# Patient Record
Sex: FEMALE | Race: BLACK OR AFRICAN AMERICAN | NOT HISPANIC OR LATINO | Employment: FULL TIME | URBAN - METROPOLITAN AREA
[De-identification: names, ages, dates, MRNs, and addresses within clinical notes are randomized per-mention and may not be internally consistent; named-entity substitution may affect disease eponyms.]

---

## 2017-05-02 ENCOUNTER — HOSPITAL ENCOUNTER (EMERGENCY)
Facility: HOSPITAL | Age: 42
Discharge: HOME | End: 2017-05-02
Attending: EMERGENCY MEDICINE
Payer: MEDICAID

## 2017-05-02 VITALS
BODY MASS INDEX: 36.96 KG/M2 | RESPIRATION RATE: 20 BRPM | SYSTOLIC BLOOD PRESSURE: 137 MMHG | HEIGHT: 66 IN | WEIGHT: 230 LBS | OXYGEN SATURATION: 98 % | DIASTOLIC BLOOD PRESSURE: 70 MMHG | HEART RATE: 75 BPM | TEMPERATURE: 99 F

## 2017-05-02 DIAGNOSIS — S39.012A LUMBAR STRAIN, INITIAL ENCOUNTER: Primary | ICD-10-CM

## 2017-05-02 PROCEDURE — 25000003 PHARM REV CODE 250: Performed by: EMERGENCY MEDICINE

## 2017-05-02 PROCEDURE — 99283 EMERGENCY DEPT VISIT LOW MDM: CPT

## 2017-05-02 RX ORDER — METHOCARBAMOL 500 MG/1
1500 TABLET, FILM COATED ORAL
Status: DISCONTINUED | OUTPATIENT
Start: 2017-05-02 | End: 2017-05-02 | Stop reason: HOSPADM

## 2017-05-02 RX ORDER — CYCLOBENZAPRINE HCL 10 MG
10 TABLET ORAL 3 TIMES DAILY PRN
Qty: 20 TABLET | Refills: 0 | Status: SHIPPED | OUTPATIENT
Start: 2017-05-02 | End: 2017-05-12

## 2017-05-02 RX ORDER — NAPROXEN 500 MG/1
500 TABLET ORAL 2 TIMES DAILY WITH MEALS
Qty: 30 TABLET | Refills: 0 | Status: SHIPPED | OUTPATIENT
Start: 2017-05-02 | End: 2017-05-07

## 2017-05-02 RX ORDER — NAPROXEN 500 MG/1
500 TABLET ORAL
Status: COMPLETED | OUTPATIENT
Start: 2017-05-02 | End: 2017-05-02

## 2017-05-02 RX ADMIN — NAPROXEN 500 MG: 500 TABLET ORAL at 08:05

## 2017-05-02 NOTE — ED AVS SNAPSHOT
[facility]  [address] [place] [address]               Eusebia Del Valle   2017  8:06 PM   ED    Description:  Female : 1977   Department:  [facility]           Your Care was Coordinated By:     Provider Role From To    Henrry Felton MD Attending Provider 17 --      Reason for Visit     Back Pain           Diagnoses this Visit        Comments    Lumbar strain, initial encounter    -  Primary       ED Disposition     None           To Do List           Follow-up Information     Schedule an appointment as soon as possible for a visit with Susanna Barron MD.    Specialty:  Internal Medicine    Why:  to establish primary care    Contact information:    [address] [place] [address] [phone]         These Medications        Disp Refills Start End    naproxen (NAPROSYN) 500 MG tablet 30 tablet 0 2017    Take 1 tablet (500 mg total) by mouth 2 (two) times daily with meals. - Oral    cyclobenzaprine (FLEXERIL) 10 MG tablet 20 tablet 0 2017    Take 1 tablet (10 mg total) by mouth 3 (three) times daily as needed for Muscle spasms. - Oral      [facility]     [facility] On [facility] Nurse Care Line -  Assistance  Unless otherwise directed by your provider, please contact [facility], our nurse care line that is available for  assistance.     Registered nurses in the [facility] provide: appointment scheduling, clinical advisement, health education, and other advisory services.  Call: [phone] (toll free)               Medications           Message regarding Medications     Verify the changes and/or additions to your medication regime listed below are the same as discussed with your clinician today.  If any of these changes or additions are incorrect, please notify your healthcare provider.        START taking these NEW medications        Refills    naproxen (NAPROSYN)  "500 MG tablet 0    Sig: Take 1 tablet (500 mg total) by mouth 2 (two) times daily with meals.    Class: Print    Route: Oral    cyclobenzaprine (FLEXERIL) 10 MG tablet 0    Sig: Take 1 tablet (10 mg total) by mouth 3 (three) times daily as needed for Muscle spasms.    Class: Print    Route: Oral      These medications were administered today        Dose Freq    naproxen tablet 500 mg 500 mg ED 1 Time    Sig: Take 1 tablet (500 mg total) by mouth ED 1 Time.    Class: Normal    Route: Oral    methocarbamol tablet 1,500 mg 1,500 mg ED 1 Time    Sig: Take 3 tablets (1,500 mg total) by mouth ED 1 Time.    Class: Normal    Route: Oral           Verify that the below list of medications is an accurate representation of the medications you are currently taking.  If none reported, the list may be blank. If incorrect, please contact your healthcare provider. Carry this list with you in case of emergency.           Current Medications     cyclobenzaprine (FLEXERIL) 10 MG tablet Take 1 tablet (10 mg total) by mouth 3 (three) times daily as needed for Muscle spasms.    hydrocodone-acetaminophen 5-325mg (NORCO) 5-325 mg per tablet Take 1 tablet by mouth every 4 (four) hours as needed.    methocarbamol tablet 1,500 mg Take 3 tablets (1,500 mg total) by mouth ED 1 Time.    naproxen (NAPROSYN) 500 MG tablet Take 1 tablet (500 mg total) by mouth 2 (two) times daily with meals.           Clinical Reference Information           Your Vitals Were     BP Pulse Temp Resp Height Weight    157/84 (BP Location: Left arm, Patient Position: Sitting) 82 98.2 °F (36.8 °C) (Oral) 18 5' 6" (1.676 m) 104.3 kg (230 lb)    Last Period SpO2 BMI          04/26/2017 (Approximate) 98% 37.12 kg/m2        Allergies as of 5/2/2017     No Known Allergies      Immunizations Administered on Date of Encounter - 5/2/2017     None      ED Micro, Lab, POCT     None      ED Imaging Orders     None      Discharge References/Attachments     LUMBOSACRAL STRAIN, " UNDERSTANDING (ENGLISH)       [facility] complies with applicable Federal civil rights laws and does not discriminate on the basis of race, color, national origin, age, disability, or sex.        Language Assistance Services     ATTENTION: Language assistance services are available, free of charge. Please call [phone].      ATENCIÓN: Si habla español, tiene a daigle disposición servicios gratuitos de asistencia lingüística. Llame al [phone].     CHÚ Ý: N?u b?n nói Ti?ng Vi?t, có các d?ch v? h? tr? ngôn ng? mi?n phí dành cho b?n. G?i s? [phone].

## 2017-05-03 NOTE — ED TRIAGE NOTES
"Lower back/bilateral flank pain began yesterday, "i work at [employer] and i picked up a red bag that was heavy".  "

## 2017-05-03 NOTE — ED PROVIDER NOTES
Encounter Date: 5/2/2017    SCRIBE #1 NOTE: I, Amara Garay, am scribing for, and in the presence of,  Henrry Felton MD. I have scribed the following portions of the note - Other sections scribed: HPI, ROS.       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 Reports pain 6/10 now.     Review of patient's allergies indicates:  No Known Allergies  HPI Comments: CC: Back Pain    HPI: 39 year old female with no reported PMHx presents to the ED c/o acute, moderate (6/10) lumbar back pain. Patient reports symptoms gradually worsened since the onset last night. Patient attributes the pain to picking up a heavy red bag of fluid at [place] yesterday. Patient denies hearing a pop. Pain worsens with bending and palpation. No prior treatment. LMP was 4/26/17. Patient denies a hx of back pain. Patient otherwise denies dysuria, hematuria, abdominal pain, fever, leg pain, vaginal discharge, numbness, weakness and other symptoms.      The history is provided by the patient. No  was used.     History reviewed. No pertinent past medical history.  History reviewed. No pertinent surgical history.  History reviewed. No pertinent family history.  Social History   Substance Use Topics    Smoking status: Never Smoker    Smokeless tobacco: None    Alcohol use No     Review of Systems   Constitutional: Negative for fever.   HENT: Negative for ear pain, rhinorrhea and sore throat.    Eyes: Negative for pain.   Respiratory: Negative for cough and shortness of breath.    Cardiovascular: Negative for chest pain.   Gastrointestinal: Negative for abdominal pain, diarrhea, nausea and vomiting.   Genitourinary: Negative for dysuria, hematuria and vaginal discharge.   Musculoskeletal: Positive for back pain (lumbar). Negative for neck pain.        (-) Leg Pain   Skin: Negative for rash and wound.    Neurological: Negative for weakness and numbness.       Physical Exam   Initial Vitals   BP Pulse Resp Temp SpO2   05/02/17 1913 05/02/17 1913 05/02/17 1913 05/02/17 1913 05/02/17 1913   157/84 82 18 98.2 °F (36.8 °C) 98 %     Physical Exam    Nursing note and vitals reviewed.  Constitutional: She appears well-developed and well-nourished.   Eyes: EOM are normal. Pupils are equal, round, and reactive to light.   Neck: Normal range of motion. Neck supple. No thyromegaly present. No JVD present.   Cardiovascular: Normal rate, regular rhythm, normal heart sounds and intact distal pulses. Exam reveals no gallop and no friction rub.    No murmur heard.  Pulmonary/Chest: Breath sounds normal. No respiratory distress.   Abdominal: Soft. Bowel sounds are normal.   Musculoskeletal: Normal range of motion. She exhibits tenderness. She exhibits no edema.   No midline ttp. FROM. Negative straight leg raise. Positive bilateral lumbar muscle ttp.    Neurological: She is alert and oriented to person, place, and time. She has normal strength.   Skin: Skin is warm and dry.         ED Course   Procedures  Labs Reviewed - No data to display                     Scribe Attestation:   Scribe #1: I performed the above scribed service and the documentation accurately describes the services I performed. I attest to the accuracy of the note.    Attending Attestation:           Physician Attestation for Scribe:  Physician Attestation Statement for Scribe #1: I, Henrry Felton MD, reviewed documentation, as scribed by Amara Garay in my presence, and it is both accurate and complete.                 ED Course     Clinical Impression:   The encounter diagnosis was Lumbar strain, initial encounter.          Henrry Felton MD  05/02/17 8270

## 2017-09-03 ENCOUNTER — HOSPITAL ENCOUNTER (EMERGENCY)
Facility: HOSPITAL | Age: 42
Discharge: HOME | End: 2017-09-03
Attending: EMERGENCY MEDICINE
Payer: MEDICAID

## 2017-09-03 VITALS
RESPIRATION RATE: 20 BRPM | SYSTOLIC BLOOD PRESSURE: 138 MMHG | HEIGHT: 66 IN | TEMPERATURE: 98 F | BODY MASS INDEX: 41.3 KG/M2 | WEIGHT: 257 LBS | OXYGEN SATURATION: 100 % | HEART RATE: 78 BPM | DIASTOLIC BLOOD PRESSURE: 81 MMHG

## 2017-09-03 DIAGNOSIS — J06.9 VIRAL URI WITH COUGH: Primary | ICD-10-CM

## 2017-09-03 DIAGNOSIS — J02.9 SORE THROAT: ICD-10-CM

## 2017-09-03 DIAGNOSIS — R09.81 NASAL CONGESTION: ICD-10-CM

## 2017-09-03 DIAGNOSIS — R09.82 POST-NASAL DRIP: ICD-10-CM

## 2017-09-03 PROBLEM — R05.9 COUGH: Status: ACTIVE | Noted: 2017-09-03

## 2017-09-03 LAB
B-HCG UR QL: NEGATIVE
CTP QC/QA: YES

## 2017-09-03 PROCEDURE — 99284 EMERGENCY DEPT VISIT MOD MDM: CPT

## 2017-09-03 PROCEDURE — 81025 URINE PREGNANCY TEST: CPT | Performed by: EMERGENCY MEDICINE

## 2017-09-03 RX ORDER — PROMETHAZINE HYDROCHLORIDE AND DEXTROMETHORPHAN HYDROBROMIDE 6.25; 15 MG/5ML; MG/5ML
5 SYRUP ORAL NIGHTLY PRN
Qty: 118 ML | Refills: 0 | Status: SHIPPED | OUTPATIENT
Start: 2017-09-03 | End: 2017-09-13

## 2017-09-03 RX ORDER — ALBUTEROL SULFATE 90 UG/1
1-2 AEROSOL, METERED RESPIRATORY (INHALATION) EVERY 6 HOURS PRN
Qty: 1 INHALER | Refills: 0 | Status: SHIPPED | OUTPATIENT
Start: 2017-09-03 | End: 2018-09-03

## 2017-09-03 RX ORDER — BENZONATATE 100 MG/1
100 CAPSULE ORAL 3 TIMES DAILY PRN
Qty: 15 CAPSULE | Refills: 0 | Status: SHIPPED | OUTPATIENT
Start: 2017-09-03 | End: 2017-09-13

## 2017-09-03 RX ORDER — FLUTICASONE PROPIONATE 50 MCG
1 SPRAY, SUSPENSION (ML) NASAL 2 TIMES DAILY PRN
Qty: 15 G | Refills: 0 | OUTPATIENT
Start: 2017-09-03 | End: 2019-02-08

## 2017-09-03 NOTE — DISCHARGE INSTRUCTIONS
Please return to the Emergency Department for any new or worsening symptoms including: worsening cough or congestion, fever, chest pain, shortness of breath, loss of consciousness, dizziness, weakness, or any other concerns.     Please follow up with your Primary Care Provider within in the week. If you do not have one, you may contact the one listed on your discharge paperwork or you may also call the [facility] Appointment [facility]k at [phone] to schedule an appointment with one.     Please take all medication as prescribed. Tessalon Pears - cough during the day. Phenergan DM for cough at night -  This medication may cause drowsiness, impair judgment, and reduce physical capabilities. Albuterol inhaler for cough. Flonase for nasal congestion.

## 2017-09-03 NOTE — ED PROVIDER NOTES
"Encounter Date: 9/3/2017    SCRIBE #1 NOTE: I, Nancy Glenna, am scribing for, and in the presence of,  MAIA Stauffer. I have scribed the following portions of the note - Other sections scribed: HPI and ROS.       History     Chief Complaint   Patient presents with    URI     cold x 1 week.  sore throat, runny nose, cough, low grade fever.  denies n/v/d     CC: URI    HPI: The pt is a 42 y.o. F with no pertinent PMHx who presents to the ED c/o URI that began 1 week ago. Pt reports that she has been experiencing sore throat, "dry" cough, postnasal drip, rhinorrhea, and mild fever. Pt expresses most concern about her sore throat. Pt denies being around sick people. Pt reports taking nyquil this morning. Pt states that she is not a smoker. No alleviating factors. Pt otherwise denies fever, nausea, emesis, and other associated symptoms.       The history is provided by the patient. No  was used.     Review of patient's allergies indicates:  No Known Allergies  History reviewed. No pertinent past medical history.  History reviewed. No pertinent surgical history.  History reviewed. No pertinent family history.  Social History   Substance Use Topics    Smoking status: Never Smoker    Smokeless tobacco: Never Used    Alcohol use No     Review of Systems   Constitutional: Positive for fever (subjective). Negative for chills and diaphoresis.   HENT: Positive for congestion, postnasal drip and rhinorrhea. Negative for ear pain and sore throat.         (+) sore throat   Eyes: Negative for redness.   Respiratory: Positive for cough. Negative for shortness of breath.    Cardiovascular: Negative for chest pain.   Gastrointestinal: Negative for abdominal pain, diarrhea, nausea and vomiting.   Genitourinary: Negative for dysuria.   Musculoskeletal: Negative for back pain and neck pain.   Skin: Negative for rash and wound.   Neurological: Negative for dizziness, syncope and numbness.   Psychiatric/Behavioral: " Negative for confusion.       Physical Exam     Initial Vitals [09/03/17 1006]   BP Pulse Resp Temp SpO2   (!) 148/84 82 16 98.2 °F (36.8 °C) 98 %      MAP       105.33         Physical Exam    Nursing note and vitals reviewed.  Constitutional: She appears well-developed and well-nourished. She is not diaphoretic. She is cooperative.  Non-toxic appearance. She does not have a sickly appearance. No distress.   HENT:   Head: Normocephalic and atraumatic.   Right Ear: External ear normal.   Left Ear: External ear normal.   Mouth/Throat: Uvula is midline and mucous membranes are normal. No trismus in the jaw. No uvula swelling. No oropharyngeal exudate, posterior oropharyngeal edema, posterior oropharyngeal erythema or tonsillar abscesses.   Tonsils, +2 without erythema or exudates.  Midline uvula.  No evidence of PTA.  Cobblestoning noted in the posterior oropharynx.   Eyes: Conjunctivae and EOM are normal.   Neck: Full passive range of motion without pain. No tracheal deviation present.   Cardiovascular: Normal rate, regular rhythm, normal heart sounds and intact distal pulses. Exam reveals no gallop and no friction rub.    No murmur heard.  Pulses:       Radial pulses are 2+ on the right side, and 2+ on the left side.   Pulmonary/Chest: Effort normal and breath sounds normal. No stridor. No tachypnea and no bradypnea. No respiratory distress. She has no wheezes. She has no rhonchi. She has no rales. She exhibits no tenderness.   Musculoskeletal: Normal range of motion.   Lymphadenopathy:     She has no cervical adenopathy.   Neurological: She is alert and oriented to person, place, and time. She has normal strength. No sensory deficit. Coordination and gait normal. GCS eye subscore is 4. GCS verbal subscore is 5. GCS motor subscore is 6.   Skin: Skin is warm, dry and intact. Capillary refill takes less than 2 seconds. No bruising and no rash noted. No cyanosis or erythema. Nails show no clubbing.   Psychiatric: She  has a normal mood and affect. Her speech is normal and behavior is normal. Judgment and thought content normal.         ED Course   Procedures  Labs Reviewed   POCT URINE PREGNANCY                   APC / Resident Notes:   This is an evaluation of a 42-year-old female that presents emergency Department with complaints of cough, runny nose, postnasal drip, sore throat for approximately one week.  She reports subjective fever earlier in the week. Physical Exam shows a non-toxic, afebrile, and well appearing female. Ears without evidence of infection.  There is cerumen in the right ear however the TM is fully visualized.  Oropharynx with +2 tonsils without evidence of infection, erythema, or exudates.  Midline uvula.  No evidence of PTA.  Neck is soft with no cervical adenopathy or meningeal signs.  Moist mucous membranes and appears well-hydrated.  Breath sounds are clear and equal to auscultation.  Heart regular rhythm, rate. Vital Signs Are Reassuring. RESULTS: UPT: Negative.  Chest x-ray: Chest x-ray with no pneumothorax, pneumonia, or pleural effusion.    My overall impression is viral URI with cough, nasal congestion. I considered, but at this time, do not suspect OM, OE, strep pharyngitis, meningitis, pneumonia, pneumothorax, pleural effusion.    D/C Meds: Tessalon Perles, Phenergan DM, albuterol, Flonase. Additional D/C Information: Hydration, Tylenol/ibuprofen for fever or body aches. The diagnosis, treatment plan, instructions for follow-up and reevaluation with her PCP as well as ED return precautions were discussed and understanding was verbalized. All questions or concerns have been addressed. This case was discussed with Dr. Taylor who is in agreement with my assessment and plan. VINOD Williamson, FNP-C       Scribe Attestation:   Scribe #1: I performed the above scribed service and the documentation accurately describes the services I performed. I attest to the accuracy of the note.    Attending  Attestation:           Physician Attestation for Scribe:  Physician Attestation Statement for Scribe #1: I, MAIA Stauffer, reviewed documentation, as scribed by Nancy Manzanares in my presence, and it is both accurate and complete.                 ED Course      Clinical Impression:   The encounter diagnosis was Cough.    Disposition:   Disposition: Discharged  Condition: Stable                        MAIA Hatfield  09/03/17 1227

## 2019-02-08 ENCOUNTER — HOSPITAL ENCOUNTER (EMERGENCY)
Facility: HOSPITAL | Age: 44
Discharge: HOME | End: 2019-02-08
Attending: EMERGENCY MEDICINE
Payer: MEDICAID

## 2019-02-08 VITALS
RESPIRATION RATE: 18 BRPM | WEIGHT: 230 LBS | TEMPERATURE: 98 F | SYSTOLIC BLOOD PRESSURE: 147 MMHG | HEIGHT: 66 IN | DIASTOLIC BLOOD PRESSURE: 74 MMHG | BODY MASS INDEX: 36.96 KG/M2 | HEART RATE: 84 BPM | OXYGEN SATURATION: 99 %

## 2019-02-08 DIAGNOSIS — R68.89 FLU-LIKE SYMPTOMS: Primary | ICD-10-CM

## 2019-02-08 DIAGNOSIS — J06.9 VIRAL URI WITH COUGH: ICD-10-CM

## 2019-02-08 DIAGNOSIS — J02.9 SORE THROAT: ICD-10-CM

## 2019-02-08 DIAGNOSIS — R05.9 COUGH: ICD-10-CM

## 2019-02-08 DIAGNOSIS — H92.02 LEFT EAR PAIN: ICD-10-CM

## 2019-02-08 LAB
B-HCG UR QL: NEGATIVE
CTP QC/QA: YES
CTP QC/QA: YES
FLUAV AG NPH QL: NEGATIVE
FLUBV AG NPH QL: NEGATIVE

## 2019-02-08 PROCEDURE — 63600175 PHARM REV CODE 636 W HCPCS: Performed by: NURSE PRACTITIONER

## 2019-02-08 PROCEDURE — 81025 URINE PREGNANCY TEST: CPT | Performed by: NURSE PRACTITIONER

## 2019-02-08 PROCEDURE — 99284 EMERGENCY DEPT VISIT MOD MDM: CPT | Mod: 25

## 2019-02-08 PROCEDURE — 96372 THER/PROPH/DIAG INJ SC/IM: CPT | Mod: 59

## 2019-02-08 PROCEDURE — 69209 REMOVE IMPACTED EAR WAX UNI: CPT

## 2019-02-08 PROCEDURE — 25000003 PHARM REV CODE 250: Performed by: NURSE PRACTITIONER

## 2019-02-08 RX ORDER — LORATADINE 10 MG/1
10 TABLET ORAL DAILY
Qty: 30 TABLET | Refills: 11 | Status: SHIPPED | OUTPATIENT
Start: 2019-02-08 | End: 2020-02-08

## 2019-02-08 RX ORDER — FLUTICASONE PROPIONATE 50 MCG
1 SPRAY, SUSPENSION (ML) NASAL 2 TIMES DAILY PRN
Qty: 15 G | Refills: 0 | Status: SHIPPED | OUTPATIENT
Start: 2019-02-08

## 2019-02-08 RX ORDER — OSELTAMIVIR PHOSPHATE 75 MG/1
75 CAPSULE ORAL 2 TIMES DAILY
Qty: 10 CAPSULE | Refills: 0 | Status: SHIPPED | OUTPATIENT
Start: 2019-02-08 | End: 2019-02-13

## 2019-02-08 RX ORDER — IBUPROFEN 600 MG/1
600 TABLET ORAL EVERY 6 HOURS PRN
Qty: 20 TABLET | Refills: 0 | Status: ON HOLD | OUTPATIENT
Start: 2019-02-08 | End: 2023-05-24 | Stop reason: HOSPADM

## 2019-02-08 RX ORDER — KETOROLAC TROMETHAMINE 30 MG/ML
30 INJECTION, SOLUTION INTRAMUSCULAR; INTRAVENOUS
Status: COMPLETED | OUTPATIENT
Start: 2019-02-08 | End: 2019-02-08

## 2019-02-08 RX ADMIN — Medication 5 DROP: at 04:02

## 2019-02-08 RX ADMIN — KETOROLAC TROMETHAMINE 30 MG: 30 INJECTION INTRAMUSCULAR; INTRAVENOUS at 04:02

## 2019-02-08 NOTE — ED TRIAGE NOTES
"Patient presents to the ED via personal transportation alone. Patient reports left ear pain x 2 days, sore throat x 1 days, productive cough x 2 days, and generalized body aches. Patient denies checking temperature at home, chills, nasal congestion, but reports having "the sweats last night".  Denies nausea, vomiting, diarrhea.  "

## 2019-02-08 NOTE — DISCHARGE INSTRUCTIONS

## 2019-02-08 NOTE — ED PROVIDER NOTES
Encounter Date: 2/8/2019       History     Chief Complaint   Patient presents with    Otalgia     Left ear pain, sore throat, and body aches x 2 days.  Reports chills last night.    Sore Throat    Generalized Body Aches     CC: Otalgia    HPI:  This is the evaluation of a 43-year-old female presenting with 2 day history of generalized body aches, subjective fever, chills, left ear pain, sore throat, and cough.  No attempted treatment prior to arrival.  She has no known medical problems.  No known allergies.      The history is provided by the patient. No  was used.     Review of patient's allergies indicates:  No Known Allergies  History reviewed. No pertinent past medical history.  History reviewed. No pertinent surgical history.  History reviewed. No pertinent family history.  Social History     Tobacco Use    Smoking status: Never Smoker    Smokeless tobacco: Never Used   Substance Use Topics    Alcohol use: No    Drug use: No     Review of Systems   Constitutional: Positive for chills and fever.   HENT: Positive for congestion, ear pain, rhinorrhea and sore throat.    Respiratory: Positive for cough. Negative for shortness of breath.    Cardiovascular: Negative for chest pain.   Gastrointestinal: Negative for nausea.   Genitourinary: Negative for dysuria.   Musculoskeletal: Positive for myalgias. Negative for back pain.   Skin: Negative for rash.   Neurological: Negative for weakness.   Hematological: Does not bruise/bleed easily.       Physical Exam     Initial Vitals [02/08/19 1614]   BP Pulse Resp Temp SpO2   (!) 147/74 84 18 98 °F (36.7 °C) 99 %      MAP       --         Physical Exam    Constitutional: She appears well-developed and well-nourished. She is not diaphoretic. No distress.   HENT:   Head: Normocephalic and atraumatic.   Right Ear: Hearing, tympanic membrane, external ear and ear canal normal.   Left Ear: Hearing, tympanic membrane, external ear and ear canal normal.    Nose: Mucosal edema and rhinorrhea present.   Mouth/Throat: Uvula is midline and mucous membranes are normal. Posterior oropharyngeal erythema present.   Eyes: Conjunctivae and EOM are normal. Pupils are equal, round, and reactive to light. Right eye exhibits no discharge. Left eye exhibits no discharge.   Neck: Normal range of motion. Neck supple.   Cardiovascular: Normal rate, regular rhythm and normal heart sounds. Exam reveals no gallop and no friction rub.    No murmur heard.  Pulmonary/Chest: Breath sounds normal. No respiratory distress.   Abdominal: Soft. There is no tenderness.   Musculoskeletal: Normal range of motion.   Lymphadenopathy:     She has no cervical adenopathy.   Neurological: She is alert and oriented to person, place, and time.   Skin: Skin is warm and dry.   Psychiatric: She has a normal mood and affect. Her behavior is normal.         ED Course   Ear Wax Removal  Date/Time: 2/8/2019 11:09 PM  Performed by: Mikala Ceja NP  Authorized by: Suraj Hernández MD   Ceruminolytics applied prior to the procedure.  Medication Used: Debrox.  Location details: left ear  Procedure type: irrigation Cerumen Removal Results: Cerumen completely removed.  Patient tolerance: Patient tolerated the procedure well with no immediate complications        Labs Reviewed   POCT URINE PREGNANCY   POCT INFLUENZA A/B          Imaging Results          X-Ray Chest PA And Lateral (Final result)  Result time 02/08/19 16:32:52    Final result by Kam Hutchinson Jr., MD (02/08/19 16:32:52)                 Impression:      No significant abnormality.      Electronically signed by: Kam Hutchinson MD  Date:    02/08/2019  Time:    16:32             Narrative:    EXAMINATION:  XR CHEST PA AND LATERAL    CLINICAL HISTORY:  Cough    TECHNIQUE:  PA and lateral views of the chest were performed.    COMPARISON:  September 2017.    FINDINGS:  Heart size and pulmonary vessels are normal.  The lungs are well aerated and clear.  No  pleural fluid.  Bones are intact.                                 Medical Decision Making:   ED Management:  This is an evaluation of a 43 y.o. female that presents to the Emergency Department for sore throat, ear pain, cough, rhinorrhea and nasal congestion for 2 days. The patient is a non-toxic, afebrile, and well appearing female. On physical exam ears and pharynx are without evidence of infection. Appears well hydrated with moist mucus membranes. Neck soft and supple with no meningeal signs or cervical lymphadenopathy. Breath sounds are clear and equal bilaterally with no adventitious breath sounds, tachypnea or respiratory distress with room air pulse ox of 99% and no evidence of hypoxia.     Vital Signs Are Reassuring.  RESULTS:   Chest x-ray with no acute process.  Symptoms consistent with flu-like illness, however flu is negative. She reports numerous sick contacts with the flu.  Will discharge patient on Tamiflu.  Symptomatic relief.  I considered, but at this time, do not suspect OM, OE, strep pharyngitis, meningitis, pneumonia, or acute bacterial sinusitis.    The diagnosis, treatment plan, instructions for follow-up and reevaluation with PCP as well as ED return precautions were discussed and understanding was verbalized. All questions or concerns have been addressed.                         Clinical Impression:   The primary encounter diagnosis was Flu-like symptoms. Diagnoses of Cough, Left ear pain, Sore throat, and Viral URI with cough were also pertinent to this visit.      Disposition:   Disposition: Discharged  Condition: Stable                        Mikala Ceja NP  02/08/19 5302

## 2020-07-21 ENCOUNTER — HOSPITAL ENCOUNTER (EMERGENCY)
Facility: HOSPITAL | Age: 45
Discharge: HOME | End: 2020-07-22
Attending: EMERGENCY MEDICINE
Payer: MEDICAID

## 2020-07-21 DIAGNOSIS — E11.9 NEW ONSET TYPE 2 DIABETES MELLITUS: Primary | ICD-10-CM

## 2020-07-21 LAB
ALBUMIN SERPL BCP-MCNC: 4.4 G/DL (ref 3.5–5.2)
ALP SERPL-CCNC: 184 U/L (ref 55–135)
ALT SERPL W/O P-5'-P-CCNC: 84 U/L (ref 10–44)
ANION GAP SERPL CALC-SCNC: 11 MMOL/L (ref 8–16)
AST SERPL-CCNC: 51 U/L (ref 10–40)
B-HCG UR QL: NEGATIVE
BASOPHILS # BLD AUTO: 0.03 K/UL (ref 0–0.2)
BASOPHILS NFR BLD: 0.3 % (ref 0–1.9)
BILIRUB SERPL-MCNC: 0.6 MG/DL (ref 0.1–1)
BUN SERPL-MCNC: 8 MG/DL (ref 6–20)
CALCIUM SERPL-MCNC: 9.7 MG/DL (ref 8.7–10.5)
CHLORIDE SERPL-SCNC: 100 MMOL/L (ref 95–110)
CO2 SERPL-SCNC: 23 MMOL/L (ref 23–29)
CREAT SERPL-MCNC: 1.1 MG/DL (ref 0.5–1.4)
CTP QC/QA: YES
DIFFERENTIAL METHOD: NORMAL
EOSINOPHIL # BLD AUTO: 0.1 K/UL (ref 0–0.5)
EOSINOPHIL NFR BLD: 1.4 % (ref 0–8)
ERYTHROCYTE [DISTWIDTH] IN BLOOD BY AUTOMATED COUNT: 11.5 % (ref 11.5–14.5)
EST. GFR  (AFRICAN AMERICAN): >60 ML/MIN/1.73 M^2
EST. GFR  (NON AFRICAN AMERICAN): >60 ML/MIN/1.73 M^2
GLUCOSE SERPL-MCNC: 249 MG/DL (ref 70–110)
GLUCOSE SERPL-MCNC: 472 MG/DL (ref 70–110)
HCT VFR BLD AUTO: 44.5 % (ref 37–48.5)
HGB BLD-MCNC: 15.2 G/DL (ref 12–16)
IMM GRANULOCYTES # BLD AUTO: 0.02 K/UL (ref 0–0.04)
IMM GRANULOCYTES NFR BLD AUTO: 0.2 % (ref 0–0.5)
LYMPHOCYTES # BLD AUTO: 4.3 K/UL (ref 1–4.8)
LYMPHOCYTES NFR BLD: 43.7 % (ref 18–48)
MCH RBC QN AUTO: 28.7 PG (ref 27–31)
MCHC RBC AUTO-ENTMCNC: 34.2 G/DL (ref 32–36)
MCV RBC AUTO: 84 FL (ref 82–98)
MONOCYTES # BLD AUTO: 0.5 K/UL (ref 0.3–1)
MONOCYTES NFR BLD: 5 % (ref 4–15)
NEUTROPHILS # BLD AUTO: 4.8 K/UL (ref 1.8–7.7)
NEUTROPHILS NFR BLD: 49.4 % (ref 38–73)
NRBC BLD-RTO: 0 /100 WBC
PLATELET # BLD AUTO: 350 K/UL (ref 150–350)
PMV BLD AUTO: 11.8 FL (ref 9.2–12.9)
POTASSIUM SERPL-SCNC: 4.3 MMOL/L (ref 3.5–5.1)
PROT SERPL-MCNC: 8 G/DL (ref 6–8.4)
RBC # BLD AUTO: 5.3 M/UL (ref 4–5.4)
SODIUM SERPL-SCNC: 134 MMOL/L (ref 136–145)
WBC # BLD AUTO: 9.8 K/UL (ref 3.9–12.7)

## 2020-07-21 PROCEDURE — 25000003 PHARM REV CODE 250: Performed by: EMERGENCY MEDICINE

## 2020-07-21 PROCEDURE — U0003 INFECTIOUS AGENT DETECTION BY NUCLEIC ACID (DNA OR RNA); SEVERE ACUTE RESPIRATORY SYNDROME CORONAVIRUS 2 (SARS-COV-2) (CORONAVIRUS DISEASE [COVID-19]), AMPLIFIED PROBE TECHNIQUE, MAKING USE OF HIGH THROUGHPUT TECHNOLOGIES AS DESCRIBED BY CMS-2020-01-R: HCPCS

## 2020-07-21 PROCEDURE — 63600175 PHARM REV CODE 636 W HCPCS: Performed by: EMERGENCY MEDICINE

## 2020-07-21 PROCEDURE — 96361 HYDRATE IV INFUSION ADD-ON: CPT

## 2020-07-21 PROCEDURE — 85025 COMPLETE CBC W/AUTO DIFF WBC: CPT

## 2020-07-21 PROCEDURE — 80053 COMPREHEN METABOLIC PANEL: CPT

## 2020-07-21 PROCEDURE — 81025 URINE PREGNANCY TEST: CPT | Performed by: EMERGENCY MEDICINE

## 2020-07-21 PROCEDURE — 99284 EMERGENCY DEPT VISIT MOD MDM: CPT | Mod: 25

## 2020-07-21 PROCEDURE — 96374 THER/PROPH/DIAG INJ IV PUSH: CPT

## 2020-07-21 RX ADMIN — INSULIN HUMAN 6 UNITS: 100 INJECTION, SOLUTION PARENTERAL at 11:07

## 2020-07-21 RX ADMIN — SODIUM CHLORIDE 1000 ML: 0.9 INJECTION, SOLUTION INTRAVENOUS at 10:07

## 2020-07-21 NOTE — Clinical Note
Eusebia Noyolas was seen and treated in our emergency department on 7/21/2020.  She may return to work on 07/24/2020.       If you have any questions or concerns, please don't hesitate to call.       RN

## 2020-07-22 VITALS
BODY MASS INDEX: 37.12 KG/M2 | TEMPERATURE: 98 F | WEIGHT: 230 LBS | DIASTOLIC BLOOD PRESSURE: 81 MMHG | HEART RATE: 77 BPM | SYSTOLIC BLOOD PRESSURE: 144 MMHG | RESPIRATION RATE: 16 BRPM | OXYGEN SATURATION: 100 %

## 2020-07-22 PROCEDURE — 25000003 PHARM REV CODE 250: Performed by: EMERGENCY MEDICINE

## 2020-07-22 RX ORDER — METFORMIN HYDROCHLORIDE 500 MG/1
500 TABLET ORAL 2 TIMES DAILY WITH MEALS
Qty: 60 TABLET | Refills: 11 | Status: ON HOLD | OUTPATIENT
Start: 2020-07-22 | End: 2023-05-24 | Stop reason: HOSPADM

## 2020-07-22 RX ORDER — METFORMIN HYDROCHLORIDE 500 MG/1
500 TABLET ORAL ONCE
Status: COMPLETED | OUTPATIENT
Start: 2020-07-22 | End: 2020-07-22

## 2020-07-22 RX ADMIN — METFORMIN HYDROCHLORIDE 500 MG: 500 TABLET, FILM COATED ORAL at 12:07

## 2020-07-22 NOTE — ED PROVIDER NOTES
"Encounter Date: 7/21/2020    SCRIBE #1 NOTE: I, Jacques Mi, am scribing for, and in the presence of,  Jericho Holman MD. I have scribed the following portions of the note - Other sections scribed: HPI, ROS, PE, MDM.       History     Chief Complaint   Patient presents with    Generalized Body Aches     Gen body aches, cough x1 week. Denies fever, chest pain, sob. "I've been around my sister who tested positive today, so my nerves bad"     Eusebia Del Valle is an 45 y.o. female presenting to the ED complaining of a sudden onset of generalized body aches persisting for one week. Patient reports symptoms of lightheadedness when moving down and getting up. Patient denies chest pain, shortness of breath, dysuria, hematuria, weakness with walking, unsteady gait, vaginal bleeding, or vaginal discharge. Patient is currently on Ibuprofen and Loratadine medication. Patient denies a past medical history of diabetes mellitus, hypertension, or anemia. No significant past surgical history reported. No known drug allergies. No tobacco, EtOH, or street drug abuse.    The history is provided by the patient.     Review of patient's allergies indicates:  No Known Allergies  History reviewed. No pertinent past medical history.  History reviewed. No pertinent surgical history.  History reviewed. No pertinent family history.  Social History     Tobacco Use    Smoking status: Never Smoker    Smokeless tobacco: Never Used   Substance Use Topics    Alcohol use: No    Drug use: No     Review of Systems   Constitutional: Negative for chills, diaphoresis, fatigue and fever.   HENT: Negative for congestion, sinus pain and sore throat.    Respiratory: Negative for cough, shortness of breath, wheezing and stridor.    Cardiovascular: Negative for chest pain, palpitations and leg swelling.   Gastrointestinal: Negative for abdominal pain, diarrhea, nausea and vomiting.   Genitourinary: Negative for dysuria, flank pain and frequency. "   Musculoskeletal: Positive for myalgias. Negative for back pain and joint swelling.   Neurological: Positive for light-headedness. Negative for dizziness, syncope, facial asymmetry, speech difficulty, weakness, numbness and headaches.   Psychiatric/Behavioral: Negative for confusion.       Physical Exam     Initial Vitals [07/21/20 1938]   BP Pulse Resp Temp SpO2   (!) 174/95 81 17 98 °F (36.7 °C) 100 %      MAP       --         Physical Exam    Nursing note and vitals reviewed.  Constitutional: She appears well-developed and well-nourished. She is not diaphoretic. No distress.   HENT:   Head: Normocephalic and atraumatic.   Mouth/Throat: Oropharynx is clear and moist. No oropharyngeal exudate.   Eyes: Conjunctivae are normal. Right eye exhibits no discharge. Left eye exhibits no discharge. No scleral icterus.   Neck: Normal range of motion. Neck supple. No thyromegaly present. No tracheal deviation present. No JVD present.   Cardiovascular: Normal rate, regular rhythm, normal heart sounds and intact distal pulses. Exam reveals no gallop and no friction rub.    No murmur heard.  Pulmonary/Chest: Breath sounds normal. No stridor. No respiratory distress. She has no wheezes. She has no rhonchi. She has no rales. She exhibits no tenderness.   Abdominal: Soft. She exhibits no distension and no mass. There is no abdominal tenderness. There is no rebound and no guarding.   Musculoskeletal: Normal range of motion. No tenderness or edema.   Lymphadenopathy:     She has no cervical adenopathy.   Neurological: She is alert and oriented to person, place, and time. She has normal strength. GCS score is 15. GCS eye subscore is 4. GCS verbal subscore is 5. GCS motor subscore is 6.   AVIS with NGND's   Skin: Skin is warm and dry. Capillary refill takes less than 2 seconds. No rash and no abscess noted. No erythema. No pallor.   Psychiatric: She has a normal mood and affect. Her behavior is normal. Judgment and thought content  normal.         ED Course   Procedures  Labs Reviewed   COMPREHENSIVE METABOLIC PANEL - Abnormal; Notable for the following components:       Result Value    Sodium 134 (*)     Glucose 472 (*)     Alkaline Phosphatase 184 (*)     AST 51 (*)     ALT 84 (*)     All other components within normal limits    Narrative:     GLUCOSE    critical result(s) called and verbal readback obtained   from JUDSON PALAFOX by [facility] 07/21/2020 22:25   CBC W/ AUTO DIFFERENTIAL   SARS-COV-2 (COVID-19) QUALITATIVE PCR   POCT URINE PREGNANCY   POCT GLUCOSE MONITORING CONTINUOUS          Imaging Results    None          Medical Decision Making:   Clinical Tests:   Lab Tests: Ordered and Reviewed            Scribe Attestation:   Scribe #1: I performed the above scribed service and the documentation accurately describes the services I performed. I attest to the accuracy of the note.      Pt arrived alert, afebrile, non-toxic in appearance, in no acute respiratory distress with VSS.  Labs returned concerning for new onset DM.  BG improved with insulin and IVF's.  Pt given Rx of metformin and counseled she has new onset DM and needs to F/U closely with a PCP within the next several days.  Pt discharged and counseled on the need to return to the nearest emergency room if they experience any other concerning symptoms.  Pt counseled to F/U outpatient with a PCP over the next week.    Jericho Holman MD                              Clinical Impression:     1. New onset type 2 diabetes mellitus                ED Disposition Condition    Discharge Stable        ED Prescriptions     Medication Sig Dispense Start Date End Date Auth. Provider    metFORMIN (GLUCOPHAGE) 500 MG tablet Take 1 tablet (500 mg total) by mouth 2 (two) times daily with meals. 60 tablet 7/22/2020 7/22/2021 Jericho Holman MD        Follow-up Information     Follow up With Specialties Details Why Contact Info    Will Palafox MD Internal Medicine Schedule an appointment as soon  as possible for a visit in 3 days to follow-up on today's visit [address]  [place] [address]  [phone]      [facility] Emergency Medicine Go to  As needed, If symptoms worsen [address] [place] [address] [phone]                           I, Jericho Holman, personally performed the services described in this documentation. All medical record entries made by the scribe were at my direction and in my presence.  I have reviewed the chart and agree that the record reflects my personal performance and is accurate and complete.             Jericho Holman MD  07/22/20 2905

## 2020-07-22 NOTE — ED NOTES
45 y.o. female to ED with c.o. generalized body aches with associated dizziness. Patient states she has been working a lot and has not been getting much sleep. Patient denies shortness of breath/ cough/ chest pain, denies fever/chills, denies n/v/d.

## 2020-07-23 ENCOUNTER — NURSE TRIAGE (OUTPATIENT)
Dept: ADMINISTRATIVE | Facility: CLINIC | Age: 45
End: 2020-07-23

## 2020-07-23 LAB
B-HCG UR QL: NEGATIVE
CTP QC/QA: YES
POCT GLUCOSE: 249 MG/DL (ref 70–110)

## 2020-07-23 PROCEDURE — 96372 THER/PROPH/DIAG INJ SC/IM: CPT | Mod: 59

## 2020-07-23 PROCEDURE — 81025 URINE PREGNANCY TEST: CPT | Performed by: PHYSICIAN ASSISTANT

## 2020-07-23 PROCEDURE — 99284 EMERGENCY DEPT VISIT MOD MDM: CPT | Mod: 25

## 2020-07-23 PROCEDURE — 82962 GLUCOSE BLOOD TEST: CPT

## 2020-07-23 NOTE — TELEPHONE ENCOUNTER
Pt state her right foot went numb suddenly, also states her neck is numb, advised her to call 911, she states she is going to get someone to bring her to ER, advised her to call back with any other needs or concerns, caller agreed     Reason for Disposition   [1] Numbness (i.e., loss of sensation) of the face, arm / hand, or leg / foot on one side of the body AND [2] sudden onset AND [3] present now    Protocols used: NEUROLOGIC DEFICIT-A-AH

## 2020-07-24 ENCOUNTER — HOSPITAL ENCOUNTER (EMERGENCY)
Facility: HOSPITAL | Age: 45
Discharge: HOME | End: 2020-07-24
Attending: EMERGENCY MEDICINE
Payer: MEDICAID

## 2020-07-24 VITALS
HEIGHT: 66 IN | DIASTOLIC BLOOD PRESSURE: 73 MMHG | HEART RATE: 81 BPM | TEMPERATURE: 98 F | RESPIRATION RATE: 18 BRPM | WEIGHT: 230 LBS | SYSTOLIC BLOOD PRESSURE: 132 MMHG | BODY MASS INDEX: 36.96 KG/M2

## 2020-07-24 DIAGNOSIS — R73.9 HYPERGLYCEMIA: Primary | ICD-10-CM

## 2020-07-24 LAB
ALBUMIN SERPL BCP-MCNC: 4 G/DL (ref 3.5–5.2)
ALP SERPL-CCNC: 160 U/L (ref 55–135)
ALT SERPL W/O P-5'-P-CCNC: 92 U/L (ref 10–44)
ANION GAP SERPL CALC-SCNC: 10 MMOL/L (ref 8–16)
AST SERPL-CCNC: 55 U/L (ref 10–40)
BILIRUB SERPL-MCNC: 0.7 MG/DL (ref 0.1–1)
BUN SERPL-MCNC: 14 MG/DL (ref 6–20)
CALCIUM SERPL-MCNC: 9.3 MG/DL (ref 8.7–10.5)
CHLORIDE SERPL-SCNC: 103 MMOL/L (ref 95–110)
CO2 SERPL-SCNC: 19 MMOL/L (ref 23–29)
CREAT SERPL-MCNC: 1 MG/DL (ref 0.5–1.4)
EST. GFR  (AFRICAN AMERICAN): >60 ML/MIN/1.73 M^2
EST. GFR  (NON AFRICAN AMERICAN): >60 ML/MIN/1.73 M^2
GLUCOSE SERPL-MCNC: 419 MG/DL (ref 70–110)
POCT GLUCOSE: 343 MG/DL (ref 70–110)
POCT GLUCOSE: 344 MG/DL (ref 70–110)
POTASSIUM SERPL-SCNC: 3.8 MMOL/L (ref 3.5–5.1)
PROT SERPL-MCNC: 6.9 G/DL (ref 6–8.4)
SARS-COV-2 RNA RESP QL NAA+PROBE: NOT DETECTED
SODIUM SERPL-SCNC: 132 MMOL/L (ref 136–145)

## 2020-07-24 PROCEDURE — 80053 COMPREHEN METABOLIC PANEL: CPT

## 2020-07-24 PROCEDURE — 25000003 PHARM REV CODE 250: Performed by: EMERGENCY MEDICINE

## 2020-07-24 PROCEDURE — 63600175 PHARM REV CODE 636 W HCPCS: Performed by: EMERGENCY MEDICINE

## 2020-07-24 RX ORDER — ACETAMINOPHEN 500 MG
1000 TABLET ORAL
Status: COMPLETED | OUTPATIENT
Start: 2020-07-24 | End: 2020-07-24

## 2020-07-24 RX ADMIN — INSULIN HUMAN 4 UNITS: 100 INJECTION, SOLUTION PARENTERAL at 02:07

## 2020-07-24 RX ADMIN — ACETAMINOPHEN 1000 MG: 500 TABLET ORAL at 12:07

## 2020-07-24 NOTE — Clinical Note
Eusebia Del Valle was seen and treated in our emergency department on 7/23/2020.  She may return to work on 07/27/2020.       If you have any questions or concerns, please don't hesitate to call.      Bryant Kessler RN RN

## 2020-07-24 NOTE — DISCHARGE INSTRUCTIONS
CT scan does not show evidence of a stroke.  Your blood sugar is elevated.  You have been given insulin in the emergency department.  It is important that you continue your medications as you have been prescribed.  Drink plenty of fluids to remain hydrated.  Comply with a diet for diabetics.  Monitor your blood glucose and keep a log of your blood sugars.  Schedule close follow-up with a primary care physician to monitor your blood sugar and address if any further adjustments to your diabetes medication regiment are warranted.    Thank you for coming to our Emergency Department today. It is important to remember that some problems are difficult to diagnose and may not be found during your first visit. Be sure to follow up with your primary care doctor and review any labs/imaging that was performed with them. If you do not have a primary care doctor, you may contact the one listed on your discharge paperwork or you may also call the [facility] Appointment Desk at [phone] to schedule an appointment with one.     All medications may potentially have side effects and it is impossible to predict which medications may give you side effects. If you feel that you are having a negative effect of any medication you should immediately stop taking them and seek medical attention.    Return to the ER with any questions/concerns, new/concerning symptoms, worsening or failure to improve. Do not drive or make any important decisions for 24 hours if you have received any pain medications, sedatives or mood altering drugs during your ER visit.

## 2020-07-24 NOTE — ED PROVIDER NOTES
Encounter Date: 7/23/2020    SCRIBE #1 NOTE: I, Jacques Love, am scribing for, and in the presence of,  Zuly Fowler MD. I have scribed the following portions of the note - Other sections scribed: HPI, ROS, PE, MDM.       History     Chief Complaint   Patient presents with    Numbness     Pt c/o numbness to the RLE and right back side of neck that began earlier today. Pt reports she just started taking Metformin last Wednesday. Speech is normal, face symmetrical, equal strength in BLE and no neuro deficits noted.     This is a 45 y.o. female with a PMHx of diabetes mellitus who presents to the Emergency Department complaining of numbness and tingling of the right foot and right-side of neck with associated headache that started today. Patient states complaint presenting at 4:00 PM atfer administering two doses of Metformin. Patient reports symptoms of minimal visual disturbance and frequency. Patient endorses diarrhea secondary to medication side effect. Patient denies chest pain, shortness of breath, nausea, emesis, abdominal pain, melena, or dysuria. Patient states she was recently diagnosed with diabetes mellitus type 2 on Tuesday and has been compliant with her new Metformin prescription.     The history is provided by the patient.     Review of patient's allergies indicates:  No Known Allergies  Past Medical History:   Diagnosis Date    Diabetes mellitus      No past surgical history on file.  No family history on file.  Social History     Tobacco Use    Smoking status: Never Smoker    Smokeless tobacco: Never Used   Substance Use Topics    Alcohol use: No    Drug use: No     Review of Systems   Constitutional: Negative for chills, diaphoresis, fatigue and fever.   HENT: Negative for congestion, sinus pain and sore throat.    Eyes: Positive for visual disturbance (resolved).   Respiratory: Negative for cough, shortness of breath, wheezing and stridor.    Cardiovascular: Negative for chest pain,  palpitations and leg swelling.   Gastrointestinal: Positive for diarrhea. Negative for abdominal pain, blood in stool, nausea and vomiting.   Genitourinary: Positive for frequency. Negative for dysuria and flank pain.   Musculoskeletal: Negative for back pain and joint swelling.   Neurological: Positive for numbness (resolved) and headaches. Negative for dizziness, syncope, facial asymmetry, speech difficulty and weakness.   Psychiatric/Behavioral: Negative for confusion.       Physical Exam     Initial Vitals [07/23/20 2218]   BP Pulse Resp Temp SpO2   132/73 81 18 98.1 °F (36.7 °C) --      MAP       --         Physical Exam    Nursing note and vitals reviewed.  Constitutional: She is not diaphoretic. No distress.   HENT:   Head: Normocephalic and atraumatic.   Mouth/Throat: Oropharynx is clear and moist.   Eyes: EOM are normal. Pupils are equal, round, and reactive to light. No scleral icterus.   Neck: Normal range of motion. Neck supple. No JVD present.   Cardiovascular: Normal rate, regular rhythm and intact distal pulses.   Pulmonary/Chest: Breath sounds normal. No stridor. No respiratory distress.   Abdominal: Soft. Bowel sounds are normal. She exhibits no distension and no mass. There is no abdominal tenderness. There is no rebound and no guarding.   Musculoskeletal: Normal range of motion. No tenderness or edema.      Comments: No midline vertebral tenderness   Neurological: She is alert. She has normal strength. No cranial nerve deficit or sensory deficit. GCS score is 15. GCS eye subscore is 4. GCS verbal subscore is 5. GCS motor subscore is 6.   Skin: Skin is warm and dry.   Psychiatric: She has a normal mood and affect.         ED Course   Procedures  Labs Reviewed   COMPREHENSIVE METABOLIC PANEL - Abnormal; Notable for the following components:       Result Value    Sodium 132 (*)     CO2 19 (*)     Glucose 419 (*)     Alkaline Phosphatase 160 (*)     AST 55 (*)     ALT 92 (*)     All other components  within normal limits   POCT GLUCOSE - Abnormal; Notable for the following components:    POCT Glucose 343 (*)     All other components within normal limits   POCT GLUCOSE - Abnormal; Notable for the following components:    POCT Glucose 344 (*)     All other components within normal limits   POCT URINE PREGNANCY          Imaging Results          CT Head Without Contrast (Final result)  Result time 07/23/20 23:33:29    Final result by Symone العراقي MD (07/23/20 23:33:29)                 Impression:      No CT evidence of acute intracranial abnormality.  If there is clinical concern for acute ischemia, further evaluation with MRI is recommended if there are no clinical contraindications.      Electronically signed by: Symone العراقي MD  Date:    07/23/2020  Time:    23:33             Narrative:    EXAMINATION:  CT HEAD WITHOUT CONTRAST    CLINICAL HISTORY:  TIA, initial exam;    TECHNIQUE:  Low dose axial images were obtained through the head.  Coronal and sagittal reformations were also performed. Contrast was not administered.    COMPARISON:  None.    FINDINGS:  There is no acute intracranial hemorrhage, hydrocephalus, midline shift or mass effect. Gray-white matter differentiation appears maintained. The basal cisterns are patent. The mastoid air cells and paranasal sinuses are clear of acute process.  Heterogeneous material in the right external auditory canal likely reflect cerumen although correlation with physical exam advised.  The visualized bones of the calvarium demonstrate no acute osseous abnormality.                                 Medical Decision Making:   History:   Old Medical Records: I decided to obtain old medical records.  Differential Diagnosis:   Includes but not limited to: Electrolyte Abnormality; Medicine Side Effect; Renal Failure.  Clinical Tests:   Lab Tests: Reviewed and Ordered  Radiological Study: Reviewed and Ordered  ED Management:  Patient is afebrile and in no acute distress at  time history and physical.  She has no obvious focal neurological deficits.  She has symmetrical strength in bilateral upper and lower extremities.  Sensation is intact to light touch throughout.  She has no focal abdominal tenderness.  Vital signs are within acceptable ranges.  CT of the brain does not suggest CVA.  Recent labs reviewed patient not have leukocytosis to suggest infectious process.  CMP ordered to evaluate for electrolyte abnormality.  Patient is significantly hyperglycemic.  She does not have anion gap to suggest DKA.  Patient has mild transaminitis that is stable.  She is tolerating p.o. without difficulty.  I have low clinical suspicion of cholecystitis or pancreatitis as the cause of patient's symptoms.  Patient currently has no symptoms consistent with hyperglycemia.  She does not appear to require insulin infusion or further emergency department diabetes workup.  She is clinically stable for discharge.  Patient reports not knowing what foods to eat for diabetes.  She has been counseled extensively on a diabetic diet.  Social work consult placed to aid patient with her insurance issues as well as with diabetes counseling hand setting up follow-up. She has been provided with printed information on eating diabetic diet, monitoring, using a glucose log. counseled on supportive care, appropriate medication usage, concerning symptoms for which to return to ER and the importance of follow up. Understanding and agreement with treatment plan was expressed.   This chart was completed using dictation software, as a result there may be some transcription errors.             Scribe Attestation:   Scribe #1: I performed the above scribed service and the documentation accurately describes the services I performed. I attest to the accuracy of the note.                          Clinical Impression:     1. Hyperglycemia            Disposition:   Disposition: Discharged  Condition: Stable     ED Disposition  Condition    Discharge Stable        ED Prescriptions     None        Follow-up Information     Follow up With Specialties Details Why Contact Info    Will Palafox MD Internal Medicine Schedule an appointment as soon as possible for a visit   [address] [facility] [address]  [phone]      [facility]  Schedule an appointment as soon as possible for a visit   [address] [facility] [address]  [phone]                            I, Zuly Fowler , personally performed the services described in this documentation. All medical record entries made by the scribe were at my direction and in my presence. I have reviewed the chart and agree that the record reflects my personal performance and is accurate and complete.             Zuly Fowler MD  07/24/20 4761

## 2020-07-24 NOTE — ED TRIAGE NOTES
Pt amb to room, here for eval of numbness and cramping sensation to feet and right side of neck while driving today after taking new medication for diabetes. Denies numbness at this time, denies chest pain, sob, n/v, easy rr/nonlabored, talks in full sentences

## 2020-07-24 NOTE — PROVIDER PROGRESS NOTES - EMERGENCY DEPT.
Emergency Department TeleTRIAGE Encounter Note      CHIEF COMPLAINT    Chief Complaint   Patient presents with    Numbness     Pt c/o numbness to the RLE and right back side of neck that began earlier today. Pt reports she just started taking Metformin last Wednesday. Speech is normal, face symmetrical, equal strength in BLE and no neuro deficits noted.       VITAL SIGNS   Initial Vitals [07/23/20 2218]   BP Pulse Resp Temp SpO2   132/73 81 18 98.1 °F (36.7 °C) --      MAP       --            ALLERGIES    Review of patient's allergies indicates:  No Known Allergies    PROVIDER TRIAGE NOTE  Patient is a 45 y.o. female presenting for evaluation of numbness. States she was driving earlier in her foot felt numb.  States it is resolved now.  Recently started metformin due to new onset diabetes.      ORDERS  Labs Reviewed - No data to display    ED Orders (720h ago, onward)    Start Ordered     Status Ordering Provider    07/23/20 2221 07/23/20 2220  POCT urine pregnancy  Once      Ordered JUDSON LINDSAY            Virtual Visit Note: The provider triage portion of this emergency department evaluation and documentation was performed via [employer], a HIPAA-compliant telemedicine application, in concert with a tele-presenter in the room. A face to face patient evaluation with one of my colleagues will occur once the patient is placed in an emergency department room.      DISCLAIMER: This note was prepared with [employer] voice recognition transcription software. Garbled syntax, mangled pronouns, and other bizarre constructions may be attributed to that software system.

## 2020-07-28 NOTE — PROGRESS NOTES
Post discharge f/u:  Patient was seen in ED on Friday 7/24/20  and needed diabetic teaching and followup.  SW spoke with patient this date to determine if she had been able to f/u with Dr Will Palafox.  Patient stated that she had not followup because her insurance (medicaid) had been discontinued.  SW provided patient with contact number to the [facility] on [address]  ([phone]).  Patient to call to make appointments.  SW advised patient to bring her ID, proof of income, and a utility bill with her to the appointment.  Patient verbalized understanding of information.    Wendy Noel LMSW, [facility]  7/28/20

## 2021-03-17 ENCOUNTER — HOSPITAL ENCOUNTER (EMERGENCY)
Facility: HOSPITAL | Age: 46
Discharge: HOME | End: 2021-03-18
Attending: STUDENT IN AN ORGANIZED HEALTH CARE EDUCATION/TRAINING PROGRAM
Payer: MEDICAID

## 2021-03-17 DIAGNOSIS — R73.9 HYPERGLYCEMIA: Primary | ICD-10-CM

## 2021-03-17 DIAGNOSIS — M54.2 NECK PAIN: ICD-10-CM

## 2021-03-17 LAB
ALBUMIN SERPL BCP-MCNC: 3.9 G/DL (ref 3.5–5.2)
ALLENS TEST: ABNORMAL
ALP SERPL-CCNC: 165 U/L (ref 55–135)
ALT SERPL W/O P-5'-P-CCNC: 73 U/L (ref 10–44)
ANION GAP SERPL CALC-SCNC: 11 MMOL/L (ref 8–16)
AST SERPL-CCNC: 47 U/L (ref 10–40)
B-OH-BUTYR BLD STRIP-SCNC: 0.1 MMOL/L (ref 0–0.5)
BACTERIA #/AREA URNS HPF: NORMAL /HPF
BASOPHILS # BLD AUTO: 0.04 K/UL (ref 0–0.2)
BASOPHILS NFR BLD: 0.4 % (ref 0–1.9)
BILIRUB SERPL-MCNC: 0.4 MG/DL (ref 0.1–1)
BILIRUB UR QL STRIP: NEGATIVE
BUN SERPL-MCNC: 10 MG/DL (ref 6–20)
CALCIUM SERPL-MCNC: 9.2 MG/DL (ref 8.7–10.5)
CHLORIDE SERPL-SCNC: 101 MMOL/L (ref 95–110)
CLARITY UR: CLEAR
CO2 SERPL-SCNC: 22 MMOL/L (ref 23–29)
COLOR UR: COLORLESS
CREAT SERPL-MCNC: 1.1 MG/DL (ref 0.5–1.4)
DELSYS: ABNORMAL
DIFFERENTIAL METHOD: ABNORMAL
EOSINOPHIL # BLD AUTO: 0.1 K/UL (ref 0–0.5)
EOSINOPHIL NFR BLD: 1.4 % (ref 0–8)
ERYTHROCYTE [DISTWIDTH] IN BLOOD BY AUTOMATED COUNT: 11.1 % (ref 11.5–14.5)
EST. GFR  (AFRICAN AMERICAN): >60 ML/MIN/1.73 M^2
EST. GFR  (NON AFRICAN AMERICAN): >60 ML/MIN/1.73 M^2
GLUCOSE SERPL-MCNC: 578 MG/DL (ref 70–110)
GLUCOSE UR QL STRIP: ABNORMAL
HCO3 UR-SCNC: 26.9 MMOL/L (ref 24–28)
HCT VFR BLD AUTO: 42.9 % (ref 37–48.5)
HGB BLD-MCNC: 14.8 G/DL (ref 12–16)
HGB UR QL STRIP: NEGATIVE
IMM GRANULOCYTES # BLD AUTO: 0.03 K/UL (ref 0–0.04)
IMM GRANULOCYTES NFR BLD AUTO: 0.3 % (ref 0–0.5)
KETONES UR QL STRIP: ABNORMAL
LEUKOCYTE ESTERASE UR QL STRIP: NEGATIVE
LYMPHOCYTES # BLD AUTO: 3.8 K/UL (ref 1–4.8)
LYMPHOCYTES NFR BLD: 37.3 % (ref 18–48)
MCH RBC QN AUTO: 29.4 PG (ref 27–31)
MCHC RBC AUTO-ENTMCNC: 34.5 G/DL (ref 32–36)
MCV RBC AUTO: 85 FL (ref 82–98)
MICROSCOPIC COMMENT: NORMAL
MONOCYTES # BLD AUTO: 0.3 K/UL (ref 0.3–1)
MONOCYTES NFR BLD: 3.3 % (ref 4–15)
NEUTROPHILS # BLD AUTO: 5.9 K/UL (ref 1.8–7.7)
NEUTROPHILS NFR BLD: 57.3 % (ref 38–73)
NITRITE UR QL STRIP: NEGATIVE
NRBC BLD-RTO: 0 /100 WBC
PCO2 BLDA: 42.4 MMHG (ref 35–45)
PH SMN: 7.41 [PH] (ref 7.35–7.45)
PH UR STRIP: 5 [PH] (ref 5–8)
PLATELET # BLD AUTO: 331 K/UL (ref 150–350)
PMV BLD AUTO: 11.7 FL (ref 9.2–12.9)
PO2 BLDA: 44 MMHG (ref 40–60)
POC BE: 2 MMOL/L
POC SATURATED O2: 80 % (ref 95–100)
POC TCO2: 28 MMOL/L (ref 24–29)
POCT GLUCOSE: 346 MG/DL (ref 70–110)
POCT GLUCOSE: 440 MG/DL (ref 70–110)
POCT GLUCOSE: 474 MG/DL (ref 70–110)
POTASSIUM SERPL-SCNC: 4 MMOL/L (ref 3.5–5.1)
PROT SERPL-MCNC: 7.5 G/DL (ref 6–8.4)
PROT UR QL STRIP: NEGATIVE
RBC # BLD AUTO: 5.03 M/UL (ref 4–5.4)
SAMPLE: ABNORMAL
SITE: ABNORMAL
SODIUM SERPL-SCNC: 134 MMOL/L (ref 136–145)
SP GR UR STRIP: >1.03 (ref 1–1.03)
URN SPEC COLLECT METH UR: ABNORMAL
UROBILINOGEN UR STRIP-ACNC: NEGATIVE EU/DL
WBC # BLD AUTO: 10.22 K/UL (ref 3.9–12.7)
YEAST URNS QL MICRO: NORMAL

## 2021-03-17 PROCEDURE — 96374 THER/PROPH/DIAG INJ IV PUSH: CPT

## 2021-03-17 PROCEDURE — 82962 GLUCOSE BLOOD TEST: CPT

## 2021-03-17 PROCEDURE — 96361 HYDRATE IV INFUSION ADD-ON: CPT

## 2021-03-17 PROCEDURE — 25000003 PHARM REV CODE 250: Performed by: PHYSICIAN ASSISTANT

## 2021-03-17 PROCEDURE — 25000003 PHARM REV CODE 250: Performed by: STUDENT IN AN ORGANIZED HEALTH CARE EDUCATION/TRAINING PROGRAM

## 2021-03-17 PROCEDURE — 81000 URINALYSIS NONAUTO W/SCOPE: CPT | Performed by: PHYSICIAN ASSISTANT

## 2021-03-17 PROCEDURE — 82010 KETONE BODYS QUAN: CPT | Performed by: PHYSICIAN ASSISTANT

## 2021-03-17 PROCEDURE — 99900035 HC TECH TIME PER 15 MIN (STAT)

## 2021-03-17 PROCEDURE — 85025 COMPLETE CBC W/AUTO DIFF WBC: CPT | Performed by: PHYSICIAN ASSISTANT

## 2021-03-17 PROCEDURE — 99284 EMERGENCY DEPT VISIT MOD MDM: CPT | Mod: 25

## 2021-03-17 PROCEDURE — 80053 COMPREHEN METABOLIC PANEL: CPT | Performed by: PHYSICIAN ASSISTANT

## 2021-03-17 RX ORDER — METHOCARBAMOL 750 MG/1
750 TABLET, FILM COATED ORAL
Status: COMPLETED | OUTPATIENT
Start: 2021-03-17 | End: 2021-03-17

## 2021-03-17 RX ORDER — IBUPROFEN 400 MG/1
400 TABLET ORAL
Status: COMPLETED | OUTPATIENT
Start: 2021-03-17 | End: 2021-03-17

## 2021-03-17 RX ADMIN — SODIUM CHLORIDE 1000 ML: 0.9 INJECTION, SOLUTION INTRAVENOUS at 10:03

## 2021-03-17 RX ADMIN — METHOCARBAMOL 750 MG: 750 TABLET ORAL at 09:03

## 2021-03-17 RX ADMIN — IBUPROFEN 400 MG: 400 TABLET, FILM COATED ORAL at 09:03

## 2021-03-17 RX ADMIN — SODIUM CHLORIDE 1000 ML: 0.9 INJECTION, SOLUTION INTRAVENOUS at 08:03

## 2021-03-18 VITALS
TEMPERATURE: 99 F | OXYGEN SATURATION: 95 % | RESPIRATION RATE: 18 BRPM | WEIGHT: 237 LBS | SYSTOLIC BLOOD PRESSURE: 139 MMHG | HEIGHT: 66 IN | BODY MASS INDEX: 38.09 KG/M2 | DIASTOLIC BLOOD PRESSURE: 82 MMHG | HEART RATE: 71 BPM

## 2021-03-18 LAB
POCT GLUCOSE: 252 MG/DL (ref 70–110)
POCT GLUCOSE: 294 MG/DL (ref 70–110)

## 2021-03-18 PROCEDURE — 96361 HYDRATE IV INFUSION ADD-ON: CPT

## 2021-03-18 PROCEDURE — 63600175 PHARM REV CODE 636 W HCPCS: Performed by: STUDENT IN AN ORGANIZED HEALTH CARE EDUCATION/TRAINING PROGRAM

## 2021-03-18 PROCEDURE — 82962 GLUCOSE BLOOD TEST: CPT

## 2021-03-18 RX ORDER — METHOCARBAMOL 500 MG/1
1000 TABLET, FILM COATED ORAL 3 TIMES DAILY
Qty: 60 TABLET | Refills: 0 | Status: SHIPPED | OUTPATIENT
Start: 2021-03-18 | End: 2021-03-28

## 2021-03-18 RX ADMIN — INSULIN HUMAN 10 UNITS: 100 INJECTION, SOLUTION PARENTERAL at 12:03

## 2023-04-01 ENCOUNTER — HOSPITAL ENCOUNTER (EMERGENCY)
Facility: HOSPITAL | Age: 48
Discharge: HOME | End: 2023-04-01
Attending: EMERGENCY MEDICINE
Payer: MEDICAID

## 2023-04-01 VITALS
DIASTOLIC BLOOD PRESSURE: 75 MMHG | OXYGEN SATURATION: 97 % | TEMPERATURE: 98 F | HEART RATE: 84 BPM | HEIGHT: 66 IN | SYSTOLIC BLOOD PRESSURE: 133 MMHG | WEIGHT: 145 LBS | RESPIRATION RATE: 16 BRPM | BODY MASS INDEX: 23.3 KG/M2

## 2023-04-01 DIAGNOSIS — H10.9 CONJUNCTIVITIS OF RIGHT EYE, UNSPECIFIED CONJUNCTIVITIS TYPE: Primary | ICD-10-CM

## 2023-04-01 PROCEDURE — 99283 EMERGENCY DEPT VISIT LOW MDM: CPT

## 2023-04-01 PROCEDURE — 25000003 PHARM REV CODE 250: Performed by: PHYSICIAN ASSISTANT

## 2023-04-01 RX ORDER — ERYTHROMYCIN 5 MG/G
OINTMENT OPHTHALMIC EVERY 4 HOURS
Qty: 1 G | Refills: 0 | Status: SHIPPED | OUTPATIENT
Start: 2023-04-01 | End: 2023-04-08

## 2023-04-01 RX ORDER — TETRACAINE HYDROCHLORIDE 5 MG/ML
2 SOLUTION OPHTHALMIC
Status: COMPLETED | OUTPATIENT
Start: 2023-04-01 | End: 2023-04-01

## 2023-04-01 RX ADMIN — FLUORESCEIN SODIUM 1 EACH: 1 STRIP OPHTHALMIC at 10:04

## 2023-04-01 RX ADMIN — TETRACAINE HYDROCHLORIDE 2 DROP: 5 SOLUTION OPHTHALMIC at 10:04

## 2023-04-01 NOTE — ED PROVIDER NOTES
Encounter Date: 4/1/2023       History     Chief Complaint   Patient presents with    Eye Pain     Pt reports itching and drainage from R eye x 3 days. Reports using eye drops with no relief.      Chief Complaint: Eye pain  History of  Present Illness: History obtained from patient. This 47 y.o. female who has past medical history of diabetes presents to the ED complaining of right eye pain, redness and drainage for 3 days.  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  She reports associated rhinorrhea.  Denies cough, fever, vision changes.  Does not were contact lenses or glasses.  No prior treatment for symptoms.      Review of patient's allergies indicates:  No Known Allergies  Past Medical History:   Diagnosis Date    Diabetes mellitus      History reviewed. No pertinent surgical history.  History reviewed. No pertinent family history.  Social History     Tobacco Use    Smoking status: Never    Smokeless tobacco: Never   Substance Use Topics    Alcohol use: No    Drug use: No     Review of Systems   Constitutional:  Negative for chills and fever.   HENT:  Positive for rhinorrhea. Negative for congestion and sore throat.    Eyes:  Positive for pain, discharge and redness. Negative for visual disturbance.   Respiratory:  Negative for cough and shortness of breath.    Cardiovascular:  Negative for chest pain.   Gastrointestinal:  Negative for abdominal pain, diarrhea, nausea and vomiting.   Genitourinary:  Negative for dysuria, frequency and hematuria.   Musculoskeletal:  Negative for back pain.   Skin:  Negative for rash.   Neurological:  Negative for dizziness, weakness and headaches.     Physical Exam     Initial Vitals [04/01/23 1003]   BP Pulse Resp Temp SpO2   133/75 84 16 98.3 °F (36.8 °C) 97 %      MAP       --         Physical Exam    Constitutional: She appears well-developed and well-nourished. She is active.  Non-toxic appearance. She does not have a sickly appearance. She does not appear ill.   HENT:    Head: Normocephalic and atraumatic.   Nose: Nose normal.   Mouth/Throat: Uvula is midline, oropharynx is clear and moist and mucous membranes are normal.   Eyes: EOM and lids are normal. Pupils are equal, round, and reactive to light. Right eye exhibits no chemosis and no discharge. No foreign body present in the right eye. Right conjunctiva is injected.   Slit lamp exam:       The right eye shows no corneal abrasion, no corneal flare, no corneal ulcer, no foreign body and no fluorescein uptake.   Neck: Neck supple.   Normal range of motion.   Full passive range of motion without pain.     Cardiovascular:  Normal rate and regular rhythm.           Musculoskeletal:      Cervical back: Full passive range of motion without pain, normal range of motion and neck supple.     Neurological: She is alert and oriented to person, place, and time.   Skin: Skin is warm. Capillary refill takes less than 2 seconds.       ED Course   Procedures  Labs Reviewed - No data to display       Imaging Results    None          Medications   fluorescein ophthalmic strip 1 each (1 each Right Eye Given by Other 4/1/23 1009)   TETRAcaine HCl (PF) 0.5 % Drop 2 drop (2 drops Right Eye Given by Other 4/1/23 1009)     Medical Decision Making:   ED Management:  This is an evaluation of a 47 y.o. female that presents to the Emergency Department for right eye pain, redness and drainage. Physical Exam shows a non-toxic, afebrile, and well appearing female. PERRL. EOM's intact and without pain.  Visual Acuity OD 20/20, OS 20/20. There is no proptosis, scleral icterus , erythema or increased warmth in periorbital area. There is conjunctival injection. No findings of open globe injury. There is no dendritic lesions, facial rash and a negative Scott's sign. After instillation topical anesthetic, pain improved.  Negative Sergio sign.    Vital Signs Are Reassuring. If available, previous records reviewed.       My overall impression is conjunctivitis.  I considered, but at this time, do not suspect iritis, acute angle closure glaucoma, orbital or preseptal cellulitis, herpetic involvement, open globe injury.     The diagnosis, treatment plan, instructions for follow-up and reevaluation with opthalmology as well as ED return precautions were discussed and understanding was verbalized. All questions or concerns have been addressed.                          Clinical Impression:   Final diagnoses:  [H10.9] Conjunctivitis of right eye, unspecified conjunctivitis type (Primary)        ED Disposition Condition    Discharge Stable          ED Prescriptions       Medication Sig Dispense Start Date End Date Auth. Provider    erythromycin (ROMYCIN) ophthalmic ointment Place into the right eye every 4 (four) hours. Place a 1/2 inch ribbon of ointment into the lower eyelid. for 7 days 1 g 4/1/2023 4/8/2023 Asher Palafox PA-C          Follow-up Information       Follow up With Specialties Details Why Contact Info Additional Information    [facility] - Ophthalmology Ophthalmology Call in 1 day  [address] [facility] [address] [phone] 1st Floor    [facility] Emergency Dept Emergency Medicine Go in 1 day If symptoms worsen [address]  [facility] [address] [phone]              Asher Palafox PA-C  04/01/23 1019

## 2023-04-01 NOTE — Clinical Note
"Eusebia WATSON"Eusebia Del Valle was seen and treated in our emergency department on 4/1/2023.  She may return to work on 04/05/2023.       If you have any questions or concerns, please don't hesitate to call.      Asher Palafox PA-C"

## 2023-05-22 ENCOUNTER — HOSPITAL ENCOUNTER (EMERGENCY)
Facility: HOSPITAL | Age: 48
Discharge: HOME | End: 2023-05-22
Attending: STUDENT IN AN ORGANIZED HEALTH CARE EDUCATION/TRAINING PROGRAM
Payer: MEDICAID

## 2023-05-22 VITALS
TEMPERATURE: 98 F | OXYGEN SATURATION: 98 % | HEIGHT: 66 IN | SYSTOLIC BLOOD PRESSURE: 122 MMHG | DIASTOLIC BLOOD PRESSURE: 75 MMHG | BODY MASS INDEX: 32.14 KG/M2 | WEIGHT: 200 LBS | HEART RATE: 82 BPM | RESPIRATION RATE: 18 BRPM

## 2023-05-22 DIAGNOSIS — S30.1XXA CONTUSION, FLANK, INITIAL ENCOUNTER: Primary | ICD-10-CM

## 2023-05-22 DIAGNOSIS — S39.012A STRAIN OF LUMBAR REGION, INITIAL ENCOUNTER: ICD-10-CM
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
## 2023-05-22 PROCEDURE — 99284 EMERGENCY DEPT VISIT MOD MDM: CPT

## 2023-05-22 PROCEDURE — 25000003 PHARM REV CODE 250: Performed by: PHYSICIAN ASSISTANT

## 2023-05-22 RX ORDER — MELOXICAM 7.5 MG/1
7.5 TABLET ORAL DAILY
Qty: 12 TABLET | Refills: 0 | Status: SHIPPED | OUTPATIENT
Start: 2023-05-22

## 2023-05-22 RX ORDER — ACETAMINOPHEN 500 MG
1000 TABLET ORAL
Status: COMPLETED | OUTPATIENT
Start: 2023-05-22 | End: 2023-05-22

## 2023-05-22 RX ORDER — ORPHENADRINE CITRATE 100 MG/1
100 TABLET, EXTENDED RELEASE ORAL 2 TIMES DAILY
Qty: 8 TABLET | Refills: 0 | Status: SHIPPED | OUTPATIENT
Start: 2023-05-22 | End: 2023-05-26

## 2023-05-22 RX ADMIN — ACETAMINOPHEN 1000 MG: 500 TABLET ORAL at 01:05
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
## 2023-05-22 NOTE — Clinical Note
"Eusebia WATSON"Eusebia Del Valle was seen and treated in our emergency department on 5/22/2023.  She may return to work on 05/25/2023.       If you have any questions or concerns, please don't hesitate to call.      Jose Jesus PA-C"

## 2023-05-22 NOTE — DISCHARGE INSTRUCTIONS
Thank you for coming to our Emergency Department today. It is important to remember that some problems or medical conditions are difficult to diagnose and may not be found or addressed during your Emergency Department visit.  These conditions often start with non-specific symptoms and can only be diagnosed on follow up visits with your primary care physician or specialist when the symptoms continue or change. Please remember that all medical conditions can change, and we cannot predict how you will be feeling tomorrow or the next day. Return to the ER with any questions/concerns, new/concerning symptoms, worsening or failure to improve.       Be sure to follow up with your primary care doctor and review all labs/imaging/tests that were performed during your ER visit with them. It is very common for us to identify non-emergent incidental findings which must be followed up with your primary care physician.  Some labs/imaging/tests may be outside of the normal range, and require non-emergent follow-up and/or further investigation/treatment/procedures/testing to help diagnose/exclude/prevent complications or other potentially serious medical conditions. Some abnormalities may not have been discussed or addressed during your ER visit.     An ER visit does not replace a primary care visit, and many screening tests or follow-up tests cannot be ordered by an ER doctor or performed by the ER. Some tests may even require pre-approval.    If you do not have a primary care doctor, you may contact the one listed on your discharge paperwork or you may also call the [facility] Appointment Desk at [phone] , or [address] [place] [address] at  [phone] to schedule an appointment, or establish care with a primary care doctor or even a specialist and to obtain information about local resources. It is important to your health that you have a primary care doctor.    Please take all medications as directed. We have done our best to select  a medication for you that will treat your condition however, all medications may potentially have side-effects and it is impossible to predict which medications may give you side-effects or what those side-effects (if any) those medications may give you.  If you feel that you are having a negative effect or side-effect of any medication you should stop taking those medications immediately and seek medical attention. If you feel that you are having a life-threatening reaction call 911.        Do not drive, swim, climb to height, take a bath, operate heavy machinery, drink alcohol or take potentially sedating medications, sign any legal documents or make any important decisions for 24 hours if you have received any pain medications, sedatives or mood altering drugs during your ER visit or within 24 hours of taking them if they have been prescribed to you.     You can find additional resources for Dentists, hearing aids, durable medical equipment, low cost pharmacies and other resources at [email or web address]

## 2023-05-23 ENCOUNTER — HOSPITAL ENCOUNTER (INPATIENT)
Facility: HOSPITAL | Age: 48
LOS: 1 days | Discharge: HOME | DRG: 247 | End: 2023-05-24
Attending: EMERGENCY MEDICINE | Admitting: INTERNAL MEDICINE
Payer: MEDICAID

## 2023-05-23 DIAGNOSIS — I21.3 STEMI (ST ELEVATION MYOCARDIAL INFARCTION): ICD-10-CM

## 2023-05-23 DIAGNOSIS — I21.02 ST ELEVATION MYOCARDIAL INFARCTION INVOLVING LEFT ANTERIOR DESCENDING (LAD) CORONARY ARTERY: Primary | ICD-10-CM

## 2023-05-23 DIAGNOSIS — I25.119 CHEST PAIN DUE TO CAD: ICD-10-CM

## 2023-05-23 DIAGNOSIS — R07.9 CHEST PAIN: ICD-10-CM

## 2023-05-23 PROBLEM — E66.9 CLASS 1 OBESITY IN ADULT: Status: ACTIVE | Noted: 2023-05-23

## 2023-05-23 PROBLEM — E66.811 CLASS 1 OBESITY IN ADULT: Status: ACTIVE | Noted: 2023-05-23

## 2023-05-23 PROBLEM — E78.5 HYPERLIPIDEMIA: Status: ACTIVE | Noted: 2023-05-23

## 2023-05-23 PROBLEM — E11.59 TYPE 2 DIABETES MELLITUS WITH CIRCULATORY DISORDER, WITHOUT LONG-TERM CURRENT USE OF INSULIN: Status: ACTIVE | Noted: 2023-05-23

## 2023-05-23 LAB
ABO + RH BLD: NORMAL
ALBUMIN SERPL BCP-MCNC: 4.2 G/DL (ref 3.5–5.2)
ALLENS TEST: ABNORMAL
ALLENS TEST: ABNORMAL
ALP SERPL-CCNC: 167 U/L (ref 55–135)
ALT SERPL W/O P-5'-P-CCNC: 39 U/L (ref 10–44)
ANION GAP SERPL CALC-SCNC: 15 MMOL/L (ref 8–16)
ANION GAP SERPL CALC-SCNC: 18 MMOL/L (ref 8–16)
APTT PPP: 24.5 SEC (ref 21–32)
ASCENDING AORTA: 2.86 CM
AST SERPL-CCNC: 33 U/L (ref 10–40)
AV INDEX (PROSTH): 0.64
AV MEAN GRADIENT: 5 MMHG
AV PEAK GRADIENT: 8 MMHG
AV VALVE AREA: 2.13 CM2
AV VELOCITY RATIO: 0.59
B-HCG UR QL: NEGATIVE
BASOPHILS # BLD AUTO: 0.08 K/UL (ref 0–0.2)
BASOPHILS NFR BLD: 0.6 % (ref 0–1.9)
BILIRUB SERPL-MCNC: 0.4 MG/DL (ref 0.1–1)
BLD GP AB SCN CELLS X3 SERPL QL: NORMAL
BNP SERPL-MCNC: 22 PG/ML (ref 0–99)
BSA FOR ECHO PROCEDURE: 2.09 M2
BUN SERPL-MCNC: 9 MG/DL (ref 6–20)
BUN SERPL-MCNC: 9 MG/DL (ref 6–30)
CALCIUM SERPL-MCNC: 10 MG/DL (ref 8.7–10.5)
CHLORIDE SERPL-SCNC: 100 MMOL/L (ref 95–110)
CHLORIDE SERPL-SCNC: 98 MMOL/L (ref 95–110)
CHOLEST SERPL-MCNC: 215 MG/DL (ref 120–199)
CHOLEST/HDLC SERPL: 6.1 {RATIO} (ref 2–5)
CO2 SERPL-SCNC: 21 MMOL/L (ref 23–29)
CREAT SERPL-MCNC: 0.8 MG/DL (ref 0.5–1.4)
CREAT SERPL-MCNC: 1.1 MG/DL (ref 0.5–1.4)
CTP QC/QA: YES
CTP QC/QA: YES
CV ECHO LV RWT: 0.39 CM
DIFFERENTIAL METHOD: ABNORMAL
DOP CALC AO PEAK VEL: 1.39 M/S
DOP CALC AO VTI: 25.7 CM
DOP CALC LVOT AREA: 3.3 CM2
DOP CALC LVOT DIAMETER: 2.06 CM
DOP CALC LVOT PEAK VEL: 0.82 M/S
DOP CALC LVOT STROKE VOLUME: 54.63 CM3
DOP CALCLVOT PEAK VEL VTI: 16.4 CM
E WAVE DECELERATION TIME: 140.27 MSEC
E/A RATIO: 0.78
E/E' RATIO: 15.8 M/S
ECHO LV POSTERIOR WALL: 1 CM (ref 0.6–1.1)
EJECTION FRACTION: 40 %
EOSINOPHIL # BLD AUTO: 0.2 K/UL (ref 0–0.5)
EOSINOPHIL NFR BLD: 1.4 % (ref 0–8)
ERYTHROCYTE [DISTWIDTH] IN BLOOD BY AUTOMATED COUNT: 11.1 % (ref 11.5–14.5)
EST. GFR  (NO RACE VARIABLE): >60 ML/MIN/1.73 M^2
ESTIMATED AVG GLUCOSE: 223 MG/DL (ref 68–131)
FRACTIONAL SHORTENING: 25 % (ref 28–44)
GLUCOSE SERPL-MCNC: 392 MG/DL (ref 70–110)
GLUCOSE SERPL-MCNC: 394 MG/DL (ref 70–110)
HBA1C MFR BLD: 9.4 % (ref 4–5.6)
HCT VFR BLD AUTO: 46.8 % (ref 37–48.5)
HCT VFR BLD CALC: 47 %PCV (ref 36–54)
HDLC SERPL-MCNC: 35 MG/DL (ref 40–75)
HDLC SERPL: 16.3 % (ref 20–50)
HGB BLD-MCNC: 16.5 G/DL (ref 12–16)
IMM GRANULOCYTES # BLD AUTO: 0.03 K/UL (ref 0–0.04)
IMM GRANULOCYTES NFR BLD AUTO: 0.2 % (ref 0–0.5)
INR PPP: 1 (ref 0.8–1.2)
INTERVENTRICULAR SEPTUM: 0.91 CM (ref 0.6–1.1)
IVC DIAMETER: 1.4 CM
IVRT: 110.37 MSEC
LA MAJOR: 4.7 CM
LA MINOR: 3.71 CM
LA WIDTH: 3.7 CM
LDLC SERPL CALC-MCNC: 148 MG/DL (ref 63–159)
LEFT ATRIUM SIZE: 3.25 CM
LEFT ATRIUM VOLUME INDEX: 20.9 ML/M2
LEFT ATRIUM VOLUME: 42.38 CM3
LEFT INTERNAL DIMENSION IN SYSTOLE: 3.87 CM (ref 2.1–4)
LEFT VENTRICLE DIASTOLIC VOLUME INDEX: 61.94 ML/M2
LEFT VENTRICLE DIASTOLIC VOLUME: 125.74 ML
LEFT VENTRICLE MASS INDEX: 88 G/M2
LEFT VENTRICLE SYSTOLIC VOLUME INDEX: 31.9 ML/M2
LEFT VENTRICLE SYSTOLIC VOLUME: 64.83 ML
LEFT VENTRICULAR INTERNAL DIMENSION IN DIASTOLE: 5.13 CM (ref 3.5–6)
LEFT VENTRICULAR MASS: 178.57 G
LV LATERAL E/E' RATIO: 19.75 M/S
LV SEPTAL E/E' RATIO: 13.17 M/S
LVOT MG: 1.58 MMHG
LVOT MV: 0.59 CM/S
LYMPHOCYTES # BLD AUTO: 6.1 K/UL (ref 1–4.8)
LYMPHOCYTES NFR BLD: 46.3 % (ref 18–48)
MCH RBC QN AUTO: 29.2 PG (ref 27–31)
MCHC RBC AUTO-ENTMCNC: 35.3 G/DL (ref 32–36)
MCV RBC AUTO: 83 FL (ref 82–98)
MONOCYTES # BLD AUTO: 0.7 K/UL (ref 0.3–1)
MONOCYTES NFR BLD: 5.1 % (ref 4–15)
MV PEAK A VEL: 1.01 M/S
MV PEAK E VEL: 0.79 M/S
MV STENOSIS PRESSURE HALF TIME: 40.68 MS
MV VALVE AREA P 1/2 METHOD: 5.41 CM2
NEUTROPHILS # BLD AUTO: 6.1 K/UL (ref 1.8–7.7)
NEUTROPHILS NFR BLD: 46.4 % (ref 38–73)
NONHDLC SERPL-MCNC: 180 MG/DL
NRBC BLD-RTO: 0 /100 WBC
PISA TR MAX VEL: 2.67 M/S
PLATELET # BLD AUTO: 403 K/UL (ref 150–450)
PMV BLD AUTO: 11.4 FL (ref 9.2–12.9)
POC ACTIVATED CLOTTING TIME K: 215 SEC (ref 74–137)
POC ACTIVATED CLOTTING TIME K: 306 SEC (ref 74–137)
POC IONIZED CALCIUM: 1.18 MMOL/L (ref 1.06–1.42)
POC TCO2 (MEASURED): 26 MMOL/L (ref 23–29)
POCT GLUCOSE: 209 MG/DL (ref 70–110)
POCT GLUCOSE: 216 MG/DL (ref 70–110)
POCT GLUCOSE: 220 MG/DL (ref 70–110)
POCT GLUCOSE: 277 MG/DL (ref 70–110)
POCT GLUCOSE: 339 MG/DL (ref 70–110)
POTASSIUM BLD-SCNC: 4 MMOL/L (ref 3.5–5.1)
POTASSIUM SERPL-SCNC: 3.9 MMOL/L (ref 3.5–5.1)
PROT SERPL-MCNC: 8.3 G/DL (ref 6–8.4)
PROTHROMBIN TIME: 10.5 SEC (ref 9–12.5)
PV PEAK VELOCITY: 0.77 CM/S
RA MAJOR: 3.67 CM
RA PRESSURE: 8 MMHG
RA WIDTH: 2.72 CM
RBC # BLD AUTO: 5.65 M/UL (ref 4–5.4)
RIGHT VENTRICULAR END-DIASTOLIC DIMENSION: 3.12 CM
SAMPLE: ABNORMAL
SARS-COV-2 RDRP RESP QL NAA+PROBE: NEGATIVE
SINUS: 2.94 CM
SITE: ABNORMAL
SITE: ABNORMAL
SODIUM BLD-SCNC: 137 MMOL/L (ref 136–145)
SODIUM SERPL-SCNC: 136 MMOL/L (ref 136–145)
SPECIMEN OUTDATE: NORMAL
STJ: 2.4 CM
TDI LATERAL: 0.04 M/S
TDI SEPTAL: 0.06 M/S
TDI: 0.05 M/S
TR MAX PG: 29 MMHG
TRICUSPID ANNULAR PLANE SYSTOLIC EXCURSION: 1.85 CM
TRIGL SERPL-MCNC: 160 MG/DL (ref 30–150)
TROPONIN I SERPL DL<=0.01 NG/ML-MCNC: 0.07 NG/ML (ref 0–0.03)
TV PEAK GRADIENT: 1.62 MMHG
TV REST PULMONARY ARTERY PRESSURE: 37 MMHG
WBC # BLD AUTO: 13.2 K/UL (ref 3.9–12.7)

## 2023-05-23 PROCEDURE — 63600175 PHARM REV CODE 636 W HCPCS: Performed by: EMERGENCY MEDICINE

## 2023-05-23 PROCEDURE — 92978 ENDOLUMINL IVUS OCT C 1ST: CPT | Mod: LD | Performed by: INTERNAL MEDICINE

## 2023-05-23 PROCEDURE — 80053 COMPREHEN METABOLIC PANEL: CPT | Performed by: EMERGENCY MEDICINE

## 2023-05-23 PROCEDURE — C1769 GUIDE WIRE: HCPCS | Performed by: INTERNAL MEDICINE

## 2023-05-23 PROCEDURE — C1887 CATHETER, GUIDING: HCPCS | Performed by: INTERNAL MEDICINE

## 2023-05-23 PROCEDURE — C1725 CATH, TRANSLUMIN NON-LASER: HCPCS | Performed by: INTERNAL MEDICINE

## 2023-05-23 PROCEDURE — 93010 ELECTROCARDIOGRAM REPORT: CPT | Mod: 59,,, | Performed by: INTERNAL MEDICINE

## 2023-05-23 PROCEDURE — C1874 STENT, COATED/COV W/DEL SYS: HCPCS | Performed by: INTERNAL MEDICINE

## 2023-05-23 PROCEDURE — 25500020 PHARM REV CODE 255: Performed by: INTERNAL MEDICINE

## 2023-05-23 PROCEDURE — 92978 PR IVUS, CORONARY, 1ST VESSEL: ICD-10-PCS | Mod: 26,LD,, | Performed by: INTERNAL MEDICINE

## 2023-05-23 PROCEDURE — 85025 COMPLETE CBC W/AUTO DIFF WBC: CPT | Performed by: EMERGENCY MEDICINE

## 2023-05-23 PROCEDURE — 82330 ASSAY OF CALCIUM: CPT

## 2023-05-23 PROCEDURE — 85730 THROMBOPLASTIN TIME PARTIAL: CPT | Performed by: EMERGENCY MEDICINE

## 2023-05-23 PROCEDURE — 83880 ASSAY OF NATRIURETIC PEPTIDE: CPT | Performed by: EMERGENCY MEDICINE

## 2023-05-23 PROCEDURE — 99285 EMERGENCY DEPT VISIT HI MDM: CPT | Mod: 25

## 2023-05-23 PROCEDURE — 84132 ASSAY OF SERUM POTASSIUM: CPT

## 2023-05-23 PROCEDURE — 93010 EKG 12-LEAD: ICD-10-PCS | Mod: 76,,, | Performed by: INTERNAL MEDICINE

## 2023-05-23 PROCEDURE — 99152 PR MOD CONSCIOUS SEDATION, SAME PHYS, 5+ YRS, FIRST 15 MIN: ICD-10-PCS | Mod: ,,, | Performed by: INTERNAL MEDICINE

## 2023-05-23 PROCEDURE — 99152 MOD SED SAME PHYS/QHP 5/>YRS: CPT | Mod: ,,, | Performed by: INTERNAL MEDICINE

## 2023-05-23 PROCEDURE — 93458 PR CATH PLACE/CORON ANGIO, IMG SUPER/INTERP,W LEFT HEART VENTRICULOGRAPHY: ICD-10-PCS | Mod: 26,51,59, | Performed by: INTERNAL MEDICINE

## 2023-05-23 PROCEDURE — 94761 N-INVAS EAR/PLS OXIMETRY MLT: CPT

## 2023-05-23 PROCEDURE — 99153 MOD SED SAME PHYS/QHP EA: CPT | Performed by: INTERNAL MEDICINE

## 2023-05-23 PROCEDURE — 80061 LIPID PANEL: CPT | Performed by: EMERGENCY MEDICINE

## 2023-05-23 PROCEDURE — 93005 ELECTROCARDIOGRAM TRACING: CPT

## 2023-05-23 PROCEDURE — 93458 L HRT ARTERY/VENTRICLE ANGIO: CPT | Mod: 26,51,59, | Performed by: INTERNAL MEDICINE

## 2023-05-23 PROCEDURE — 25000003 PHARM REV CODE 250: Performed by: HOSPITALIST

## 2023-05-23 PROCEDURE — 27201423 OPTIME MED/SURG SUP & DEVICES STERILE SUPPLY: Performed by: INTERNAL MEDICINE

## 2023-05-23 PROCEDURE — 81025 URINE PREGNANCY TEST: CPT | Performed by: EMERGENCY MEDICINE

## 2023-05-23 PROCEDURE — 93010 ELECTROCARDIOGRAM REPORT: CPT | Mod: 76,,, | Performed by: INTERNAL MEDICINE

## 2023-05-23 PROCEDURE — C9606 PERC D-E COR REVASC W AMI S: HCPCS | Mod: LD | Performed by: INTERNAL MEDICINE

## 2023-05-23 PROCEDURE — 84295 ASSAY OF SERUM SODIUM: CPT

## 2023-05-23 PROCEDURE — 25000003 PHARM REV CODE 250: Performed by: EMERGENCY MEDICINE

## 2023-05-23 PROCEDURE — C1753 CATH, INTRAVAS ULTRASOUND: HCPCS | Performed by: INTERNAL MEDICINE

## 2023-05-23 PROCEDURE — 99291 CRITICAL CARE FIRST HOUR: CPT | Mod: 25,,, | Performed by: INTERNAL MEDICINE

## 2023-05-23 PROCEDURE — 63600175 PHARM REV CODE 636 W HCPCS: Performed by: INTERNAL MEDICINE

## 2023-05-23 PROCEDURE — 92941 PRQ TRLML REVSC TOT OCCL AMI: CPT | Mod: LD,,, | Performed by: INTERNAL MEDICINE

## 2023-05-23 PROCEDURE — 99291 PR CRITICAL CARE, E/M 30-74 MINUTES: ICD-10-PCS | Mod: 25,,, | Performed by: INTERNAL MEDICINE

## 2023-05-23 PROCEDURE — 92941 PR AMI ANY METHOD: ICD-10-PCS | Mod: LD,,, | Performed by: INTERNAL MEDICINE

## 2023-05-23 PROCEDURE — 93458 L HRT ARTERY/VENTRICLE ANGIO: CPT | Mod: 59 | Performed by: INTERNAL MEDICINE

## 2023-05-23 PROCEDURE — 92978 ENDOLUMINL IVUS OCT C 1ST: CPT | Mod: 26,LD,, | Performed by: INTERNAL MEDICINE

## 2023-05-23 PROCEDURE — 84484 ASSAY OF TROPONIN QUANT: CPT | Performed by: EMERGENCY MEDICINE

## 2023-05-23 PROCEDURE — 99900035 HC TECH TIME PER 15 MIN (STAT)

## 2023-05-23 PROCEDURE — 63600175 PHARM REV CODE 636 W HCPCS: Performed by: HOSPITALIST

## 2023-05-23 PROCEDURE — C1894 INTRO/SHEATH, NON-LASER: HCPCS | Performed by: INTERNAL MEDICINE

## 2023-05-23 PROCEDURE — 85610 PROTHROMBIN TIME: CPT | Performed by: EMERGENCY MEDICINE

## 2023-05-23 PROCEDURE — 85347 COAGULATION TIME ACTIVATED: CPT | Performed by: INTERNAL MEDICINE

## 2023-05-23 PROCEDURE — 20000000 HC ICU ROOM

## 2023-05-23 PROCEDURE — 85014 HEMATOCRIT: CPT

## 2023-05-23 PROCEDURE — 83036 HEMOGLOBIN GLYCOSYLATED A1C: CPT | Performed by: EMERGENCY MEDICINE

## 2023-05-23 PROCEDURE — 25000003 PHARM REV CODE 250: Performed by: INTERNAL MEDICINE

## 2023-05-23 PROCEDURE — 86900 BLOOD TYPING SEROLOGIC ABO: CPT | Performed by: EMERGENCY MEDICINE

## 2023-05-23 PROCEDURE — 82565 ASSAY OF CREATININE: CPT

## 2023-05-23 PROCEDURE — 99152 MOD SED SAME PHYS/QHP 5/>YRS: CPT | Performed by: INTERNAL MEDICINE

## 2023-05-23 DEVICE — EVEROLIMUS-ELUTING PLATINUM CHROMIUM CORONARY STENT SYSTEM
Type: IMPLANTABLE DEVICE | Site: HEART | Status: FUNCTIONAL
Brand: SYNERGY™ XD

## 2023-05-23 RX ORDER — MIDAZOLAM HYDROCHLORIDE 1 MG/ML
INJECTION INTRAMUSCULAR; INTRAVENOUS
Status: DISCONTINUED | OUTPATIENT
Start: 2023-05-23 | End: 2023-05-23 | Stop reason: HOSPADM

## 2023-05-23 RX ORDER — ATORVASTATIN CALCIUM 40 MG/1
80 TABLET, FILM COATED ORAL NIGHTLY
Status: DISCONTINUED | OUTPATIENT
Start: 2023-05-23 | End: 2023-05-24 | Stop reason: HOSPADM

## 2023-05-23 RX ORDER — DEXTROSE 40 %
30 GEL (GRAM) ORAL
Status: DISCONTINUED | OUTPATIENT
Start: 2023-05-23 | End: 2023-05-24 | Stop reason: HOSPADM

## 2023-05-23 RX ORDER — ATROPINE SULFATE 0.1 MG/ML
0.5 INJECTION INTRAVENOUS
Status: DISCONTINUED | OUTPATIENT
Start: 2023-05-23 | End: 2023-05-24 | Stop reason: HOSPADM

## 2023-05-23 RX ORDER — DIPHENHYDRAMINE HCL 25 MG
25 CAPSULE ORAL EVERY 6 HOURS PRN
Status: DISCONTINUED | OUTPATIENT
Start: 2023-05-23 | End: 2023-05-24 | Stop reason: HOSPADM

## 2023-05-23 RX ORDER — ZOLPIDEM TARTRATE 5 MG/1
5 TABLET ORAL NIGHTLY PRN
Status: DISCONTINUED | OUTPATIENT
Start: 2023-05-23 | End: 2023-05-24 | Stop reason: HOSPADM

## 2023-05-23 RX ORDER — SODIUM CHLORIDE 0.9 % (FLUSH) 0.9 %
10 SYRINGE (ML) INJECTION
Status: DISCONTINUED | OUTPATIENT
Start: 2023-05-23 | End: 2023-05-23

## 2023-05-23 RX ORDER — HYDROCODONE BITARTRATE AND ACETAMINOPHEN 5; 325 MG/1; MG/1
1 TABLET ORAL EVERY 4 HOURS PRN
Status: DISCONTINUED | OUTPATIENT
Start: 2023-05-23 | End: 2023-05-24 | Stop reason: HOSPADM

## 2023-05-23 RX ORDER — MORPHINE SULFATE 4 MG/ML
2 INJECTION, SOLUTION INTRAMUSCULAR; INTRAVENOUS EVERY 10 MIN PRN
Status: DISCONTINUED | OUTPATIENT
Start: 2023-05-23 | End: 2023-05-24 | Stop reason: HOSPADM

## 2023-05-23 RX ORDER — LIDOCAINE HYDROCHLORIDE 10 MG/ML
INJECTION, SOLUTION EPIDURAL; INFILTRATION; INTRACAUDAL; PERINEURAL
Status: DISCONTINUED | OUTPATIENT
Start: 2023-05-23 | End: 2023-05-23 | Stop reason: HOSPADM

## 2023-05-23 RX ORDER — INSULIN ASPART 100 [IU]/ML
0-5 INJECTION, SOLUTION INTRAVENOUS; SUBCUTANEOUS
Status: DISCONTINUED | OUTPATIENT
Start: 2023-05-23 | End: 2023-05-24 | Stop reason: HOSPADM

## 2023-05-23 RX ORDER — INSULIN ASPART 100 [IU]/ML
8 INJECTION, SOLUTION INTRAVENOUS; SUBCUTANEOUS
Status: DISCONTINUED | OUTPATIENT
Start: 2023-05-23 | End: 2023-05-24 | Stop reason: HOSPADM

## 2023-05-23 RX ORDER — VERAPAMIL HYDROCHLORIDE 2.5 MG/ML
INJECTION, SOLUTION INTRAVENOUS
Status: DISCONTINUED | OUTPATIENT
Start: 2023-05-23 | End: 2023-05-23 | Stop reason: HOSPADM

## 2023-05-23 RX ORDER — IODIXANOL 320 MG/ML
INJECTION, SOLUTION INTRAVASCULAR
Status: DISCONTINUED | OUTPATIENT
Start: 2023-05-23 | End: 2023-05-23 | Stop reason: HOSPADM

## 2023-05-23 RX ORDER — ASPIRIN 81 MG/1
81 TABLET ORAL DAILY
Status: DISCONTINUED | OUTPATIENT
Start: 2023-05-23 | End: 2023-05-24 | Stop reason: HOSPADM

## 2023-05-23 RX ORDER — MAG HYDROX/ALUMINUM HYD/SIMETH 200-200-20
15 SUSPENSION, ORAL (FINAL DOSE FORM) ORAL EVERY 6 HOURS PRN
Status: DISCONTINUED | OUTPATIENT
Start: 2023-05-23 | End: 2023-05-24 | Stop reason: HOSPADM

## 2023-05-23 RX ORDER — GLUCAGON 1 MG
1 KIT INJECTION
Status: DISCONTINUED | OUTPATIENT
Start: 2023-05-23 | End: 2023-05-24 | Stop reason: HOSPADM

## 2023-05-23 RX ORDER — HEPARIN SODIUM 5000 [USP'U]/ML
4000 INJECTION, SOLUTION INTRAVENOUS; SUBCUTANEOUS ONCE
Status: COMPLETED | OUTPATIENT
Start: 2023-05-23 | End: 2023-05-23

## 2023-05-23 RX ORDER — FENTANYL CITRATE 50 UG/ML
INJECTION, SOLUTION INTRAMUSCULAR; INTRAVENOUS
Status: DISCONTINUED | OUTPATIENT
Start: 2023-05-23 | End: 2023-05-23 | Stop reason: HOSPADM

## 2023-05-23 RX ORDER — HYDRALAZINE HYDROCHLORIDE 20 MG/ML
10 INJECTION INTRAMUSCULAR; INTRAVENOUS EVERY 6 HOURS PRN
Status: DISCONTINUED | OUTPATIENT
Start: 2023-05-23 | End: 2023-05-24 | Stop reason: HOSPADM

## 2023-05-23 RX ORDER — DEXTROSE 40 %
15 GEL (GRAM) ORAL
Status: DISCONTINUED | OUTPATIENT
Start: 2023-05-23 | End: 2023-05-24 | Stop reason: HOSPADM

## 2023-05-23 RX ORDER — ASPIRIN 325 MG
325 TABLET ORAL
Status: DISCONTINUED | OUTPATIENT
Start: 2023-05-23 | End: 2023-05-23

## 2023-05-23 RX ORDER — LOSARTAN POTASSIUM 25 MG/1
25 TABLET ORAL DAILY
Status: DISCONTINUED | OUTPATIENT
Start: 2023-05-23 | End: 2023-05-24 | Stop reason: HOSPADM

## 2023-05-23 RX ORDER — METOPROLOL SUCCINATE 25 MG/1
25 TABLET, EXTENDED RELEASE ORAL DAILY
Status: DISCONTINUED | OUTPATIENT
Start: 2023-05-23 | End: 2023-05-24 | Stop reason: HOSPADM

## 2023-05-23 RX ORDER — NITROGLYCERIN 0.4 MG/1
0.4 TABLET SUBLINGUAL EVERY 5 MIN PRN
Status: DISCONTINUED | OUTPATIENT
Start: 2023-05-23 | End: 2023-05-24 | Stop reason: HOSPADM

## 2023-05-23 RX ORDER — CLOPIDOGREL BISULFATE 75 MG/1
75 TABLET ORAL DAILY
Status: DISCONTINUED | OUTPATIENT
Start: 2023-05-24 | End: 2023-05-24 | Stop reason: HOSPADM

## 2023-05-23 RX ORDER — ONDANSETRON 4 MG/1
8 TABLET, ORALLY DISINTEGRATING ORAL EVERY 8 HOURS PRN
Status: DISCONTINUED | OUTPATIENT
Start: 2023-05-23 | End: 2023-05-24 | Stop reason: HOSPADM

## 2023-05-23 RX ORDER — CLOPIDOGREL 300 MG/1
600 TABLET, FILM COATED ORAL
Status: COMPLETED | OUTPATIENT
Start: 2023-05-23 | End: 2023-05-23

## 2023-05-23 RX ORDER — HEPARIN SODIUM 1000 [USP'U]/ML
INJECTION, SOLUTION INTRAVENOUS; SUBCUTANEOUS
Status: DISCONTINUED | OUTPATIENT
Start: 2023-05-23 | End: 2023-05-23 | Stop reason: HOSPADM

## 2023-05-23 RX ORDER — ACETAMINOPHEN 325 MG/1
650 TABLET ORAL EVERY 4 HOURS PRN
Status: DISCONTINUED | OUTPATIENT
Start: 2023-05-23 | End: 2023-05-24 | Stop reason: HOSPADM

## 2023-05-23 RX ORDER — ALPRAZOLAM 0.25 MG/1
0.25 TABLET ORAL EVERY 8 HOURS PRN
Status: DISCONTINUED | OUTPATIENT
Start: 2023-05-23 | End: 2023-05-24 | Stop reason: HOSPADM

## 2023-05-23 RX ADMIN — INSULIN ASPART 2 UNITS: 100 INJECTION, SOLUTION INTRAVENOUS; SUBCUTANEOUS at 11:05

## 2023-05-23 RX ADMIN — INSULIN DETEMIR 25 UNITS: 100 INJECTION, SOLUTION SUBCUTANEOUS at 08:05

## 2023-05-23 RX ADMIN — CLOPIDOGREL BISULFATE 600 MG: 300 TABLET, FILM COATED ORAL at 01:05

## 2023-05-23 RX ADMIN — HYDROCODONE BITARTRATE AND ACETAMINOPHEN 1 TABLET: 5; 325 TABLET ORAL at 08:05

## 2023-05-23 RX ADMIN — INSULIN ASPART 8 UNITS: 100 INJECTION, SOLUTION INTRAVENOUS; SUBCUTANEOUS at 07:05

## 2023-05-23 RX ADMIN — METOPROLOL SUCCINATE 25 MG: 25 TABLET, EXTENDED RELEASE ORAL at 08:05

## 2023-05-23 RX ADMIN — ACETAMINOPHEN 650 MG: 325 TABLET ORAL at 05:05

## 2023-05-23 RX ADMIN — INSULIN ASPART 1 UNITS: 100 INJECTION, SOLUTION INTRAVENOUS; SUBCUTANEOUS at 08:05

## 2023-05-23 RX ADMIN — INSULIN ASPART 3 UNITS: 100 INJECTION, SOLUTION INTRAVENOUS; SUBCUTANEOUS at 07:05

## 2023-05-23 RX ADMIN — INSULIN ASPART 2 UNITS: 100 INJECTION, SOLUTION INTRAVENOUS; SUBCUTANEOUS at 04:05

## 2023-05-23 RX ADMIN — ASPIRIN 81 MG: 81 TABLET, COATED ORAL at 08:05

## 2023-05-23 RX ADMIN — LOSARTAN POTASSIUM 25 MG: 25 TABLET, FILM COATED ORAL at 08:05

## 2023-05-23 RX ADMIN — SODIUM CHLORIDE 1200 ML: 9 INJECTION, SOLUTION INTRAVENOUS at 04:05

## 2023-05-23 RX ADMIN — ZOLPIDEM TARTRATE 5 MG: 5 TABLET ORAL at 08:05

## 2023-05-23 RX ADMIN — ATORVASTATIN CALCIUM 80 MG: 40 TABLET, FILM COATED ORAL at 08:05

## 2023-05-23 RX ADMIN — INSULIN ASPART 8 UNITS: 100 INJECTION, SOLUTION INTRAVENOUS; SUBCUTANEOUS at 04:05

## 2023-05-23 RX ADMIN — INSULIN ASPART 8 UNITS: 100 INJECTION, SOLUTION INTRAVENOUS; SUBCUTANEOUS at 11:05

## 2023-05-23 RX ADMIN — HEPARIN SODIUM 4000 UNITS: 5000 INJECTION INTRAVENOUS; SUBCUTANEOUS at 01:05

## 2023-05-23 NOTE — PLAN OF CARE
Case Management Assessment     PCP: Dr. Solis  Pharmacy: [facility]- [address]    Patient Arrived From: home  Existing Help at Home: dependent children    Barriers to Discharge: none    Discharge Plan:    A. Home with family   B. Home with family         05/23/23 1440   Discharge Assessment   Assessment Type Discharge Planning Assessment   Confirmed/corrected address, phone number and insurance Yes   Confirmed Demographics Correct on Facesheet   Source of Information patient   When was your last doctors appointment?   (Pt hasn't seen pcp in awhile)   Reason For Admission ST elevation myocardial infarction involving lt anterior descending artery.   People in Home child(gen), dependent   Facility Arrived From: home   Do you expect to return to your current living situation? Yes   Do you have help at home or someone to help you manage your care at home? No   Prior to hospitilization cognitive status: Alert/Oriented   Current cognitive status: Alert/Oriented   Walking or Climbing Stairs   (none)   Dressing/Bathing   (none)   Equipment Currently Used at Home none   Readmission within 30 days? No   Patient currently being followed by outpatient case management? No   Do you currently have service(s) that help you manage your care at home? No   Do you take prescription medications? No   Do you have prescription coverage? Yes   Coverage [employer]- Medicaid   Do you have any problems affording any of your prescribed medications? TBD   Who is going to help you get home at discharge? Heather- sister   How do you get to doctors appointments? car, drives self   Are you on dialysis? No   Do you take coumadin? No   Discharge Plan A Home with family   Discharge Plan B Home with family   DME Needed Upon Discharge  none   Discharge Plan discussed with: Patient   Transition of Care Barriers None   Physical Activity   On average, how many days per week do you engage in moderate to strenuous exercise (like a brisk walk)? 0 days   On  average, how many minutes do you engage in exercise at this level? 0 min   Financial Resource Strain   How hard is it for you to pay for the very basics like food, housing, medical care, and heating? Not very   Housing Stability   In the last 12 months, was there a time when you were not able to pay the mortgage or rent on time? N   In the last 12 months, how many places have you lived? 1   In the last 12 months, was there a time when you did not have a steady place to sleep or slept in a shelter (including now)? N   Transportation Needs   In the past 12 months, has lack of transportation kept you from medical appointments or from getting medications? no   In the past 12 months, has lack of transportation kept you from meetings, work, or from getting things needed for daily living? No   Food Insecurity   Within the past 12 months, you worried that your food would run out before you got the money to buy more. Never true   Within the past 12 months, the food you bought just didn't last and you didn't have money to get more. Never true   Stress   Do you feel stress - tense, restless, nervous, or anxious, or unable to sleep at night because your mind is troubled all the time - these days? Only a littl   Social Connections   In a typical week, how many times do you talk on the phone with family, friends, or neighbors? More than 3   How often do you get together with friends or relatives? More than 3   How often do you attend Tenriism or Zoroastrian services? Never   Do you belong to any clubs or organizations such as Tenriism groups, unions, fraternal or athletic groups, or school groups? No   How often do you attend meetings of the clubs or organizations you belong to? Never   Are you , , , , never , or living with a partner? Never marrie   Alcohol Use   Q1: How often do you have a drink containing alcohol? Never   Q2: How many drinks containing alcohol do you have on a typical day when  you are drinking? None   Q3: How often do you have six or more drinks on one occasion? Never   OTHER   Name(s) of People in Home dependent children

## 2023-05-23 NOTE — HOSPITAL COURSE
Patient came with STEMI,S/P STEMI/AWMI,1V CAD,Successful PCI prox LAD 3.5x24 Synergy VIGNESH,patient is being observed in ICU,Hospital medicine is consulted frop DM management,blood sugar is poorly controlled,added basal and  prandial insulin,Hb1AC is pending,likely diana insulin at DC time,was only on metformin at home.

## 2023-05-23 NOTE — Clinical Note
The radial band was applied to the right radial artery. 18 cc's of air were inserted into the closure device.

## 2023-05-23 NOTE — ASSESSMENT & PLAN NOTE
CP with [facility]-acute  Cath planned  Loaded with ASA/plavix/heparin  Check echo  Check lipids/start statin    Risks, benefits and alternatives of the catheterization procedure were discussed with the patient which include but are not limited to: bleeding, infection, death, heart attack, arrhythmia, kidney failure, stroke, need for emergency surgery, etc.  Patient understands and and agrees to proceed.  Consent was placed on the chart.

## 2023-05-23 NOTE — Clinical Note
The right radial was prepped. The site was prepped with ChloraPrep. The site was clipped. The patient was draped. The patient was positioned supine.

## 2023-05-23 NOTE — SUBJECTIVE & OBJECTIVE
Interval History: denies chest pain and SOB.    Review of Systems   Constitutional:  Positive for appetite change. Negative for activity change and chills.   HENT:  Negative for congestion and drooling.    Eyes:  Negative for discharge.   Respiratory:  Negative for apnea.    Cardiovascular:  Negative for chest pain and leg swelling.   Gastrointestinal:  Negative for abdominal distention and abdominal pain.   Endocrine: Negative for polyphagia.   Genitourinary:  Negative for difficulty urinating and dysuria.   Musculoskeletal:  Negative for arthralgias and back pain.   Allergic/Immunologic: Negative for environmental allergies.   Neurological:  Negative for dizziness.   Hematological:  Negative for adenopathy. Does not bruise/bleed easily.   Objective:     Vital Signs (Most Recent):  Temp: 99 °F (37.2 °C) (05/23/23 0315)  Pulse: 87 (05/23/23 0600)  Resp: 19 (05/23/23 0600)  BP: 131/76 (05/23/23 0600)  SpO2: 97 % (05/23/23 0600) Vital Signs (24h Range):  Temp:  [97.6 °F (36.4 °C)-99.5 °F (37.5 °C)] 99 °F (37.2 °C)  Pulse:  [] 87  Resp:  [15-26] 19  SpO2:  [96 %-99 %] 97 %  BP: (111-151)/() 131/76     Weight: 93.4 kg (205 lb 14.6 oz)  Body mass index is 33.23 kg/m².    Intake/Output Summary (Last 24 hours) at 5/23/2023 0705  Last data filed at 5/23/2023 0657  Gross per 24 hour   Intake --   Output 700 ml   Net -700 ml         Physical Exam  HENT:      Head: Normocephalic.      Nose: Nose normal.      Mouth/Throat:      Mouth: Mucous membranes are moist.   Eyes:      Extraocular Movements: Extraocular movements intact.      Pupils: Pupils are equal, round, and reactive to light.   Cardiovascular:      Rate and Rhythm: Normal rate and regular rhythm.      Heart sounds: No murmur heard.  Pulmonary:      Effort: No respiratory distress.   Abdominal:      General: Abdomen is flat. There is no distension.      Palpations: Abdomen is soft.      Tenderness: There is no abdominal tenderness.   Musculoskeletal:          General: No swelling or deformity.   Skin:     Coloration: Skin is not jaundiced.   Neurological:      General: No focal deficit present.      Mental Status: She is alert.      Cranial Nerves: No cranial nerve deficit.   Psychiatric:         Mood and Affect: Mood normal.         Behavior: Behavior normal.           Significant Labs: All pertinent labs within the past 24 hours have been reviewed.  BMP:   Recent Labs   Lab 05/23/23  0112   *      K 3.9      CO2 21*   BUN 9   CREATININE 1.1   CALCIUM 10.0     CBC:   Recent Labs   Lab 05/23/23  0112 05/23/23  0149   WBC 13.20*  --    HGB 16.5*  --    HCT 46.8 47     --      CMP:   Recent Labs   Lab 05/23/23  0112      K 3.9      CO2 21*   *   BUN 9   CREATININE 1.1   CALCIUM 10.0   PROT 8.3   ALBUMIN 4.2   BILITOT 0.4   ALKPHOS 167*   AST 33   ALT 39   ANIONGAP 15       Significant Imaging: I have reviewed all pertinent imaging results/findings within the past 24 hours.

## 2023-05-23 NOTE — Clinical Note
104 ml of contrast were injected throughout the case. 96 mL of contrast was the total wasted during the case. 200 mL was the total amount used during the case.

## 2023-05-23 NOTE — CARE UPDATE
Pre-sedation Assessment:    1. ASA score: Emergency  2. Malampati class: III  3. Patient or family history of any reaction to anesthesia or sedation: none  4. Plan for sedation: Versed/Fentanyl (moderate conscious sedation)  5. H&P within 30 days of the procedure, updated within 24 hrs or admission or registration: performed (see H&P/progress notes)

## 2023-05-23 NOTE — Clinical Note
The catheter was reinserted into the proximal   left anterior descending. Post IVUS performed, IVUS catheter removed.
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
## 2023-05-23 NOTE — ASSESSMENT & PLAN NOTE
Patient's FSGs are uncontrolled due to hyperglycemia on current medication regimen.  Last A1c reviewed- No results found for: LABA1C, HGBA1C  Most recent fingerstick glucose reviewed- Recent Labs   Lab 05/23/23  0108   POCTGLUCOSE 339*     Current correctional scale  Medium  Maintain anti-hyperglycemic dose as follows-   Antihyperglycemics (From admission, onward)    Start     Stop Route Frequency Ordered    05/23/23 0900  insulin detemir U-100 (Levemir) pen 25 Units         -- SubQ Daily 05/23/23 0659    05/23/23 0800  insulin aspart U-100 pen 8 Units         -- SubQ 3 times daily with meals 05/23/23 0659    05/23/23 0346  insulin aspart U-100 pen 0-5 Units         -- SubQ Before meals & nightly PRN 05/23/23 0250        Hold Oral hypoglycemics while patient is in the hospital.  Started on basal dn prandial nsulin,myesha need home insulin at DC time,Hb1C is pending.

## 2023-05-23 NOTE — ED NOTES
Nurse witnessed consent with Dr. Gibbs at bedside and witnessed pt sign consent. MD took consent with him when leaving the room.

## 2023-05-23 NOTE — ED NOTES
Patient's  is Heather Del Valle (sister) and phone number is [phone]. Patient is calling sister at this time.

## 2023-05-23 NOTE — Clinical Note
The catheter was inserted into the proximal   left anterior descending. IVUS performed, IVUS catheter removed.

## 2023-05-23 NOTE — PLAN OF CARE
Vasband removed without bleeding or hematoma.  No chest pain.  Up to bathroom without adverse symptoms.  VSS.  Blood sugar improved with addition of new insulin regimen.
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
## 2023-05-23 NOTE — CARE UPDATE
Prelim echo EF 40% with anteroapical akinesis.  Cont DAPT/statin  Added losartan and toprol  Eventual aldactone  Repeat echo in 3 months (Aug 2023).
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
## 2023-05-23 NOTE — CARE UPDATE
[facility] [place]  Nurses Note -- 4 Eyes      5/23/2023       Skin assessed on: Admit      [x] No Pressure Injuries Present    []Prevention Measures Documented    [] Yes LDA  for Pressure Injury Previously documented     [] Yes New Pressure Injury Discovered   [] LDA for New Pressure Injury Added      Attending RN:  Paulina Story RN     Second RN:  Marci Izaguirre RN

## 2023-05-23 NOTE — CONSULTS
Nutrition-Related Diabetes Education        Learners: Eusebia Del Valle    Current HbA1c:  Hemoglobin A1C   Date Value Ref Range Status   05/23/2023 9.4 (H) 4.0 - 5.6 % Final     Comment:     ADA Screening Guidelines:  5.7-6.4%  Consistent with prediabetes  >or=6.5%  Consistent with diabetes    High levels of fetal hemoglobin interfere with the HbA1C  assay. Heterozygous hemoglobin variants (HbS, HgC, etc)do  not significantly interfere with this assay.   However, presence of multiple variants may affect accuracy.          Home diabetes medication(s):  Diabetes Medications               metFORMIN (GLUCOPHAGE) 500 MG tablet Take 1 tablet (500 mg total) by mouth 2 (two) times daily with meals.          Hospital Medications               dextrose 40 % gel 15,000 mg 15,000 mg, Oral, As needed (PRN)    dextrose 40 % gel 30,000 mg 30,000 mg, Oral, As needed (PRN)    glucagon (human recombinant) injection 1 mg 1 mg, Intramuscular, As needed (PRN), Turn patient on their side, give IM, and NOTIFY MD IMMEDIATELY.<BR><BR>Feed the patient as soon as patient awakens and is able to swallow.    insulin aspart U-100 pen 0-5 Units 0-5 Units, Subcutaneous, Before meals &amp; nightly PRN, **LOW CORRECTION DOSE**<BR>Blood Glucose<BR>mg/dL                  Pre-meal                2200<BR>151-200                0 unit                      0 unit<BR>201-250                2 units                    1 unit<BR>251-300                3 units                    1 unit<BR>301-350                4 units                    2 units<BR>&gt;350                     5 units                    3 units<BR>Administer subcutaneously if needed at times designated by monitoring schedule. <BR>DO NOT HOLD correction dose insulin for patients who are  NPO.<BR>&quot;HIGH ALERT MEDICATION&quot; - Administer with meals or TF/TPN.    insulin aspart U-100 pen 8 Units 8 Units, Subcutaneous, 3 times daily with meals, &quot;HIGH ALERT MEDICATION&quot; - Administer with  meals or TF/TPN.    insulin detemir U-100 (Levemir) pen 25 Units 25 Units, Subcutaneous, Daily, If Blood Glucose is less than 100 mg/dL at BEDTIME, give 16 grams (four glucose tablets) X 1 dose for patients who can take PO. Recheck BG in 30 minutes and call MD for insulin dose adjustments prior to administering insulin detemir (Levemir).<BR>&quot;HIGH ALERT MEDICATION&quot;             Nutrition Education with handouts: Carbohydrate Controlled diet - Diabetes and diet- Carbohydrate counting diet    Comments: RD consult 2/2 diabetic education. Pt sleeping at time of visit. Educations left in patient room.       Please consult as needed.  Thank you!     Windy Ulloa, Registration Eligible, Provisional LDN

## 2023-05-23 NOTE — EICU
EICU BRIEF INITIAL EVALUATION:       HISTORY:      47-year-old woman admitted to ICU status post coronary angiogram with drug-eluting stent to LAD for STEMI.    History of type 2 diabetes.      DVT prophylaxis not indicated  GI prophylaxis not indicated    /78, P 82, RR 20, O2 sat 97   Resting comfortably on room air      CAMERA ASSESSMENT: Yes      TELEMETRY: Reviewed      NOTES: Reviewed     LABS: Reviewed      IMAGING: Personally reviewed.      DISCUSSED with bedside nurse        ASSESSMENT AND PLAN:       Acute coronary syndrome status post PCI-continue ICU monitoring, statins, DAPT    Dm 2-monitor glucose, hold oral agents, continue sliding scale      I have reviewed the plan of care for the patient and agree with the plan of care unless noted otherwise above.      BERNIE Ponce MD  eICU Attending  [phone]

## 2023-05-23 NOTE — LETTER
May 24, 2023       [facility] [place] [address]  Phone: [phone]         Eusebia Del Valle  [address] [address]        Eusebia Del Valle    Was treated here on 05/24/2023    May Return to work/school on 05/31/23            Sincerely,    Jass Gibbs MD
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
## 2023-05-23 NOTE — BRIEF OP NOTE
[facility] [place] Lab  Brief Operative Note     SUMMARY     Surgery Date: 5/23/2023     Surgeon(s) and Role:     * Jass Gibsb MD - Primary    Assisting Surgeon: None    Pre-op Diagnosis:  STEMI (ST elevation myocardial infarction) [I21.3]    Post-op Diagnosis:  Post-Op Diagnosis Codes:     * STEMI (ST elevation myocardial infarction) [I21.3]    Procedure(s) (LRB):  Angiogram, Coronary, with Left Heart Cath (N/A)  IVUS, Coronary  PTCA, Single Vessel  Stent, Drug Eluting, Single Vessel, Coronary    Anesthesia: RN IV Sedation    Description of the findings of the procedure: uneventful C/cor angio/PCI (IVUS guided) prox LAD 3.5x24 Synergy VIGNESH/R rad vasband    Findings/Key Components:  LVEDP: 4mmHg  LVEF: echo ordered    Dominance: Right  LM: normal  LAD: prox 99% thrombotic stenosis, T2 flow.  Post-PCI mid 30-40% stenosis noted.  Ramus: normal  LCx: normal  RCA: normal    PCI prox LAD:  Preop ASA/Plavix/heparin  Predil 2.5x12  IVUS for stent sizing  Stent 3.5x24 Synergy VIGNESH 16 munir  Post-IVUS with good stent expansion and apposition, no dissection  Excellent angiographic result, T3 flow, 0% residual stenosis    Hemostasis:  R Radial band    Impression:  STEMI/AWMI  1V CAD  Successful PCI prox LAD 3.5x24 Synergy VIGNESH  R rad vasband for hemostasis    Plan:  Admit to ICU  ASA 81mg qd indefinitely  Plavix 75mg qd for 1 year (thru 5/2024)  Statin  SSI, hosp med consult for assistance with DM mgmt  Check echo  Outpat card rehab referral    Estimated Blood Loss: <50cc         Specimens: None
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
## 2023-05-23 NOTE — SUBJECTIVE & OBJECTIVE
Past Medical History:   Diagnosis Date    Diabetes mellitus        History reviewed. No pertinent surgical history.    Review of patient's allergies indicates:  No Known Allergies    Current Facility-Administered Medications on File Prior to Encounter   Medication    [COMPLETED] acetaminophen tablet 1,000 mg     Current Outpatient Medications on File Prior to Encounter   Medication Sig    albuterol 90 mcg/actuation inhaler Inhale 1-2 puffs into the lungs every 6 (six) hours as needed for Wheezing. Rescue    fluticasone (FLONASE) 50 mcg/actuation nasal spray 1 spray (50 mcg total) by Each Nare route 2 (two) times daily as needed.    ibuprofen (ADVIL,MOTRIN) 600 MG tablet Take 1 tablet (600 mg total) by mouth every 6 (six) hours as needed for Pain.    loratadine (CLARITIN) 10 mg tablet Take 1 tablet (10 mg total) by mouth once daily.    meloxicam (MOBIC) 7.5 MG tablet Take 1 tablet (7.5 mg total) by mouth once daily.    metFORMIN (GLUCOPHAGE) 500 MG tablet Take 1 tablet (500 mg total) by mouth 2 (two) times daily with meals.    orphenadrine (NORFLEX) 100 mg tablet Take 1 tablet (100 mg total) by mouth 2 (two) times daily. for 4 days     Family History    None       Tobacco Use    Smoking status: Never    Smokeless tobacco: Never   Substance and Sexual Activity    Alcohol use: No    Drug use: No    Sexual activity: Not on file     Review of Systems   Unable to perform ROS: Acuity of condition   Objective:     Vital Signs (Most Recent):  Temp: 98.4 °F (36.9 °C) (05/23/23 0111)  Pulse: 99 (05/23/23 0128)  Resp: (!) 22 (05/23/23 0128)  BP: 130/78 (05/23/23 0128)  SpO2: 99 % (05/23/23 0128) Vital Signs (24h Range):  Temp:  [97.6 °F (36.4 °C)-98.4 °F (36.9 °C)] 98.4 °F (36.9 °C)  Pulse:  [82-99] 99  Resp:  [16-26] 22  SpO2:  [96 %-99 %] 99 %  BP: (122-151)/() 130/78     Weight: 90.7 kg (199 lb 15.3 oz)  Body mass index is 32.27 kg/m².    SpO2: 99 %       No intake or output data in the 24 hours ending 05/23/23  0146    Lines/Drains/Airways       Peripheral Intravenous Line  Duration                  Peripheral IV - Single Lumen 05/23/23 0107 18 G Right Antecubital <1 day         Peripheral IV - Single Lumen 05/23/23 0115 18 G Anterior;Left Forearm <1 day                     Physical Exam  Constitutional:       General: She is not in acute distress.     Appearance: She is well-developed. She is obese. She is not ill-appearing, toxic-appearing or diaphoretic.   HENT:      Head: Normocephalic and atraumatic.   Eyes:      General: No scleral icterus.     Extraocular Movements: Extraocular movements intact.      Conjunctiva/sclera: Conjunctivae normal.      Pupils: Pupils are equal, round, and reactive to light.   Neck:      Vascular: No JVD.      Trachea: No tracheal deviation.   Cardiovascular:      Rate and Rhythm: Normal rate and regular rhythm.      Pulses:           Radial pulses are 2+ on the right side.      Heart sounds: S1 normal and S2 normal. No murmur heard.    No friction rub. No gallop.   Pulmonary:      Effort: Pulmonary effort is normal. No respiratory distress.      Breath sounds: Normal breath sounds. No stridor. No wheezing, rhonchi or rales.   Chest:      Chest wall: No tenderness.   Abdominal:      General: There is no distension.      Palpations: Abdomen is soft.   Musculoskeletal:         General: No swelling or tenderness. Normal range of motion.      Cervical back: Normal range of motion and neck supple. No rigidity.      Right lower leg: No edema.      Left lower leg: No edema.   Skin:     General: Skin is warm and dry.      Coloration: Skin is not jaundiced.   Neurological:      General: No focal deficit present.      Mental Status: She is alert and oriented to person, place, and time.      Cranial Nerves: No cranial nerve deficit.   Psychiatric:         Mood and Affect: Mood normal.         Behavior: Behavior normal.        Current Medications:   aspirin  325 mg Oral ED 1 Time       sodium chloride  0.9%    Laboratory (all labs reviewed):  CBC:  Recent Labs   Lab 07/21/20 2127 03/17/21 1812 05/23/23  0112   WBC 9.80 10.22 13.20 H   Hemoglobin 15.2 14.8 16.5 H   Hematocrit 44.5 42.9 46.8   Platelets 350 331 403       CHEMISTRIES:  Recent Labs   Lab 07/21/20 2127 07/24/20 0052 03/17/21  1812   Glucose 472  H 578 HH   Sodium 134 L 132 L 134 L   Potassium 4.3 3.8 4.0   BUN 8 14 10   Creatinine 1.1 1.0 1.1   eGFR if non African American >60 >60 >60   Calcium 9.7 9.3 9.2       CARDIAC BIOMARKERS:        COAGS:        LIPIDS/LFTS:  Recent Labs   Lab 07/21/20 2127 07/24/20 0052 03/17/21  1812   AST 51 H 55 H 47 H   ALT 84 H 92 H 73 H       BNP:        TSH:        Free T4:        Diagnostic Results:  ECG (personally reviewed and interpreted tracing(s)):  5/23/23 0104 SR 95, ASMI-acute    Chest X-Ray (personally reviewed and interpreted image(s)): 5/23/23 NAD    Echo: ordered    Cath: planned

## 2023-05-24 VITALS
HEART RATE: 91 BPM | BODY MASS INDEX: 33.1 KG/M2 | RESPIRATION RATE: 24 BRPM | WEIGHT: 205.94 LBS | HEIGHT: 66 IN | SYSTOLIC BLOOD PRESSURE: 100 MMHG | DIASTOLIC BLOOD PRESSURE: 60 MMHG | TEMPERATURE: 98 F | OXYGEN SATURATION: 97 %

## 2023-05-24 LAB
ANION GAP SERPL CALC-SCNC: 9 MMOL/L (ref 8–16)
BACTERIA #/AREA URNS HPF: ABNORMAL /HPF
BASOPHILS # BLD AUTO: 0.05 K/UL (ref 0–0.2)
BASOPHILS NFR BLD: 0.4 % (ref 0–1.9)
BILIRUB UR QL STRIP: NEGATIVE
BUN SERPL-MCNC: 12 MG/DL (ref 6–20)
CALCIUM SERPL-MCNC: 9.1 MG/DL (ref 8.7–10.5)
CAOX CRY URNS QL MICRO: ABNORMAL
CHLORIDE SERPL-SCNC: 108 MMOL/L (ref 95–110)
CLARITY UR: CLEAR
CO2 SERPL-SCNC: 20 MMOL/L (ref 23–29)
COLOR UR: YELLOW
CREAT SERPL-MCNC: 0.7 MG/DL (ref 0.5–1.4)
DIFFERENTIAL METHOD: ABNORMAL
EOSINOPHIL # BLD AUTO: 0.2 K/UL (ref 0–0.5)
EOSINOPHIL NFR BLD: 1.6 % (ref 0–8)
ERYTHROCYTE [DISTWIDTH] IN BLOOD BY AUTOMATED COUNT: 11.4 % (ref 11.5–14.5)
EST. GFR  (NO RACE VARIABLE): >60 ML/MIN/1.73 M^2
GLUCOSE SERPL-MCNC: 250 MG/DL (ref 70–110)
GLUCOSE UR QL STRIP: ABNORMAL
HCT VFR BLD AUTO: 45.4 % (ref 37–48.5)
HGB BLD-MCNC: 15.1 G/DL (ref 12–16)
HGB UR QL STRIP: NEGATIVE
IMM GRANULOCYTES # BLD AUTO: 0.03 K/UL (ref 0–0.04)
IMM GRANULOCYTES NFR BLD AUTO: 0.3 % (ref 0–0.5)
KETONES UR QL STRIP: ABNORMAL
LEUKOCYTE ESTERASE UR QL STRIP: ABNORMAL
LYMPHOCYTES # BLD AUTO: 5.1 K/UL (ref 1–4.8)
LYMPHOCYTES NFR BLD: 43.7 % (ref 18–48)
MCH RBC QN AUTO: 28.1 PG (ref 27–31)
MCHC RBC AUTO-ENTMCNC: 33.3 G/DL (ref 32–36)
MCV RBC AUTO: 85 FL (ref 82–98)
MICROSCOPIC COMMENT: ABNORMAL
MONOCYTES # BLD AUTO: 0.5 K/UL (ref 0.3–1)
MONOCYTES NFR BLD: 4.5 % (ref 4–15)
NEUTROPHILS # BLD AUTO: 5.7 K/UL (ref 1.8–7.7)
NEUTROPHILS NFR BLD: 49.5 % (ref 38–73)
NITRITE UR QL STRIP: NEGATIVE
NRBC BLD-RTO: 0 /100 WBC
PH UR STRIP: 5 [PH] (ref 5–8)
PLATELET # BLD AUTO: 345 K/UL (ref 150–450)
PMV BLD AUTO: 11.2 FL (ref 9.2–12.9)
POCT GLUCOSE: 256 MG/DL (ref 70–110)
POCT GLUCOSE: 293 MG/DL (ref 70–110)
POTASSIUM SERPL-SCNC: 3.8 MMOL/L (ref 3.5–5.1)
PROT UR QL STRIP: ABNORMAL
RBC # BLD AUTO: 5.37 M/UL (ref 4–5.4)
RBC #/AREA URNS HPF: 4 /HPF (ref 0–4)
SODIUM SERPL-SCNC: 137 MMOL/L (ref 136–145)
SP GR UR STRIP: >1.03 (ref 1–1.03)
URN SPEC COLLECT METH UR: ABNORMAL
UROBILINOGEN UR STRIP-ACNC: ABNORMAL EU/DL
WBC # BLD AUTO: 11.57 K/UL (ref 3.9–12.7)
WBC #/AREA URNS HPF: 7 /HPF (ref 0–5)
YEAST URNS QL MICRO: ABNORMAL

## 2023-05-24 PROCEDURE — 99239 HOSP IP/OBS DSCHRG MGMT >30: CPT | Mod: ,,, | Performed by: INTERNAL MEDICINE

## 2023-05-24 PROCEDURE — 36415 COLL VENOUS BLD VENIPUNCTURE: CPT | Performed by: INTERNAL MEDICINE

## 2023-05-24 PROCEDURE — 80048 BASIC METABOLIC PNL TOTAL CA: CPT | Performed by: INTERNAL MEDICINE

## 2023-05-24 PROCEDURE — 85025 COMPLETE CBC W/AUTO DIFF WBC: CPT | Performed by: INTERNAL MEDICINE

## 2023-05-24 PROCEDURE — 81000 URINALYSIS NONAUTO W/SCOPE: CPT | Performed by: INTERNAL MEDICINE

## 2023-05-24 PROCEDURE — 99239 PR HOSPITAL DISCHARGE DAY,>30 MIN: ICD-10-PCS | Mod: ,,, | Performed by: INTERNAL MEDICINE

## 2023-05-24 PROCEDURE — 25000003 PHARM REV CODE 250: Performed by: INTERNAL MEDICINE

## 2023-05-24 RX ORDER — NITROGLYCERIN 0.4 MG/1
0.4 TABLET SUBLINGUAL EVERY 5 MIN PRN
Qty: 50 TABLET | Refills: 3 | Status: SHIPPED | OUTPATIENT
Start: 2023-05-24 | End: 2024-05-23

## 2023-05-24 RX ORDER — METOPROLOL SUCCINATE 25 MG/1
25 TABLET, EXTENDED RELEASE ORAL DAILY
Qty: 90 TABLET | Refills: 3 | Status: SHIPPED | OUTPATIENT
Start: 2023-05-25 | End: 2024-05-24

## 2023-05-24 RX ORDER — ATORVASTATIN CALCIUM 80 MG/1
80 TABLET, FILM COATED ORAL NIGHTLY
Qty: 90 TABLET | Refills: 3 | Status: SHIPPED | OUTPATIENT
Start: 2023-05-24 | End: 2024-05-23

## 2023-05-24 RX ORDER — INSULIN GLARGINE 100 [IU]/ML
20 INJECTION, SOLUTION SUBCUTANEOUS NIGHTLY
Qty: 15 ML | Refills: 0 | Status: SHIPPED | OUTPATIENT
Start: 2023-05-24 | End: 2023-06-23

## 2023-05-24 RX ORDER — LOSARTAN POTASSIUM 25 MG/1
25 TABLET ORAL DAILY
Qty: 90 TABLET | Refills: 3 | Status: SHIPPED | OUTPATIENT
Start: 2023-05-25 | End: 2024-05-24

## 2023-05-24 RX ORDER — INSULIN ASPART 100 [IU]/ML
7 INJECTION, SOLUTION INTRAVENOUS; SUBCUTANEOUS
Qty: 15 ML | Refills: 0 | Status: SHIPPED | OUTPATIENT
Start: 2023-05-24 | End: 2023-06-23

## 2023-05-24 RX ORDER — ASPIRIN 81 MG/1
81 TABLET ORAL DAILY
Qty: 90 TABLET | Refills: 3 | Status: SHIPPED | OUTPATIENT
Start: 2023-05-25 | End: 2024-05-24

## 2023-05-24 RX ORDER — DEXTROSE 4 G
TABLET,CHEWABLE ORAL
Qty: 1 EACH | Refills: 0 | Status: SHIPPED | OUTPATIENT
Start: 2023-05-24

## 2023-05-24 RX ORDER — CLOPIDOGREL BISULFATE 75 MG/1
75 TABLET ORAL DAILY
Qty: 90 TABLET | Refills: 3 | Status: SHIPPED | OUTPATIENT
Start: 2023-05-25 | End: 2024-05-24

## 2023-05-24 RX ADMIN — METOPROLOL SUCCINATE 25 MG: 25 TABLET, EXTENDED RELEASE ORAL at 09:05

## 2023-05-24 RX ADMIN — CLOPIDOGREL BISULFATE 75 MG: 75 TABLET ORAL at 09:05

## 2023-05-24 RX ADMIN — INSULIN ASPART 8 UNITS: 100 INJECTION, SOLUTION INTRAVENOUS; SUBCUTANEOUS at 11:05

## 2023-05-24 RX ADMIN — ASPIRIN 81 MG: 81 TABLET, COATED ORAL at 09:05

## 2023-05-24 RX ADMIN — INSULIN ASPART 3 UNITS: 100 INJECTION, SOLUTION INTRAVENOUS; SUBCUTANEOUS at 08:05

## 2023-05-24 RX ADMIN — LOSARTAN POTASSIUM 25 MG: 25 TABLET, FILM COATED ORAL at 09:05

## 2023-05-24 RX ADMIN — INSULIN DETEMIR 25 UNITS: 100 INJECTION, SOLUTION SUBCUTANEOUS at 09:05

## 2023-05-24 RX ADMIN — INSULIN ASPART 8 UNITS: 100 INJECTION, SOLUTION INTRAVENOUS; SUBCUTANEOUS at 08:05

## 2023-05-24 NOTE — PLAN OF CARE
CM notified nurseJeniffer that pt is ready for discharge from CM standpoint. All CM needs met.     05/24/23 1014   Final Note   Assessment Type Final Discharge Note   Anticipated Discharge Disposition Home   What phone number can be called within the next 1-3 days to see how you are doing after discharge? [phone]   Hospital Resources/Appts/Education Provided Appointments scheduled and added to AVS;Appointments scheduled by Navigator/Coordinator   Post-Acute Status   Coverage Healthy [employer]- Medicaid   Discharge Delays None known at this time

## 2023-05-24 NOTE — NURSING
Doctor's note requested. Approved to return to work 5/31/23. Note signed, per Dr. Gibbs. Given to patient.

## 2023-05-24 NOTE — NURSING
[facility] [place]  Nurses Note -- 4 Eyes      5/24/2023       Skin assessed on: Q SHIFT      [x] No Pressure Injuries Present    [x]Prevention Measures Documented    [] Yes LDA  for Pressure Injury Previously documented     [] Yes New Pressure Injury Discovered   [] LDA for New Pressure Injury Added      Attending RN:  Jeniffer Dobbs RN     Second RN:  MIRACLE Ann

## 2023-05-24 NOTE — NURSING
[facility] [place] [facility]  ICU Shift Summary  Date: 5/24/2023      Prehospitalization: Home  Admit Date / LOS : 5/23/2023/ 1 days    Diagnosis: ST elevation myocardial infarction involving left anterior descending (LAD) coronary artery    Consults:        Active: Cardio       Needed: N/A     Code Status: Full Code   Advanced Directive: <no information>    LDA:  Lines/Drains/Airways       Peripheral Intravenous Line  Duration                  Peripheral IV - Single Lumen 05/23/23 0107 18 G Right Antecubital 1 day         Peripheral IV - Single Lumen 05/23/23 0115 18 G Anterior;Left Forearm 1 day                  Central Lines/Site/Justification:Patient Does Not Have Central Line  Urinary Cath/Order/Justification:Patient Does Not Have Urinary Catheter    Vasopressors/Infusions:        GOALS: Volume/ Hemodynamic: MAP >65                     RASS: 0  alert and calm    Pain Management: none       Pain Controlled: not applicable     Rhythm: NSR    Respiratory Device: Room Air                      Most Recent SBT/ SAT: N/A       MOVE Screen: PASS  ICU Liberation: not applicable    VTE Prophylaxis: Pharm  Mobility: Ambulatory, OOB to Chair, and S: Self  Stress Ulcer Prophylaxis: No    Isolation: No active isolations    Dietary:   Current Diet Order   Procedures    Diet diabetic [facility]; 2000 Calorie; Cardiac (Low Na/Chol); Standard Tray     Order Specific Question:   Indicate patient location for additional diet options:     Answer:   [facility]     Order Specific Question:   Total calories:     Answer:   2000 Calorie     Order Specific Question:   Additional Diet Options:     Answer:   Cardiac (Low Na/Chol)     Order Specific Question:   Tray type:     Answer:   Standard Tray    Diet general    Diet diabetic    Diet Cardiac      Tolerance: yes  Advancement: @ goal    I & O (24h):    Intake/Output Summary (Last 24 hours) at 5/24/2023 1242  Last data filed at 5/24/2023 1130  Gross per 24 hour   Intake 540 ml    Output 1000 ml   Net -460 ml        Restraints: No    Significant Dates:  Post Op Date: N/A  Rescue Date: N/A  Imaging/ Diagnostics:  ECHO    Noteworthy Labs:  CBG    COVID Test: (--)  CBC/Anemia Labs: Coags:    Recent Labs   Lab 05/23/23 0112 05/23/23  0149 05/24/23  0353   WBC 13.20*  --  11.57   HGB 16.5*  --  15.1   HCT 46.8 47 45.4     --  345   MCV 83  --  85   RDW 11.1*  --  11.4*    Recent Labs   Lab 05/23/23  0112   INR 1.0   APTT 24.5        Chemistries:   Recent Labs   Lab 05/23/23  0112 05/24/23  0353    137   K 3.9 3.8    108   CO2 21* 20*   BUN 9 12   CREATININE 1.1 0.7   CALCIUM 10.0 9.1   PROT 8.3  --    BILITOT 0.4  --    ALKPHOS 167*  --    ALT 39  --    AST 33  --         Cardiac Enzymes: Ejection Fractions:    Recent Labs     05/23/23  0112   TROPONINI 0.075*    EF   Date Value Ref Range Status   05/23/2023 40 % Final        POCT Glucose: HbA1c:    Recent Labs   Lab 05/23/23 2034 05/24/23  0829 05/24/23  1102   POCTGLUCOSE 220* 293* 256*    Hemoglobin A1C   Date Value Ref Range Status   05/23/2023 9.4 (H) 4.0 - 5.6 % Final     Comment:     ADA Screening Guidelines:  5.7-6.4%  Consistent with prediabetes  >or=6.5%  Consistent with diabetes    High levels of fetal hemoglobin interfere with the HbA1C  assay. Heterozygous hemoglobin variants (HbS, HgC, etc)do  not significantly interfere with this assay.   However, presence of multiple variants may affect accuracy.             ICU LOS 1d 9h  Level of Care: Discharge    Chart Check: 12 HR Done  Shift Summary/Plan for the shift: See careplan note

## 2023-05-24 NOTE — DISCHARGE SUMMARY
[facility] - [place] Intensive Care  Discharge Note    SUMMARY     Admit Date: 5/23/2023    Discharge Date and Time:  05/24/2023 11AM    Hospital Course (synopsis of major diagnoses, care, treatment, and services provided during the course of the hospital stay):   STEMI/AWMI  1V CAD  Successful PCI prox LAD 3.5x24 Synergy VIGNESH  EF 40% by echo with anterior wall motion abnormality.  Patient was initiated on goal-directed medical therapy including beta-blocker and ARB as well as aspirin, Plavix, and statin.  Hospital Medicine was consulted for assistance with diabetes management for which insulin has been prescribed.  Insulin orders will be sent to the pharmacy by the Hospital Medicine physician, Dr. Raya.  Importance of med adherence, especially with dual antiplatelet therapy, were stressed to the patient upon discharge.    Final Diagnosis: Post-Op Diagnosis Codes:     * STEMI (ST elevation myocardial infarction) [I21.3]    Disposition: Home or Self Care    Follow Up/Patient Instructions:     Medications:  Reconciled Home Medications:      Medication List        START taking these medications      aspirin 81 MG EC tablet  Commonly known as: ECOTRIN  Take 1 tablet (81 mg total) by mouth once daily.  Start taking on: May 25, 2023     atorvastatin 80 MG tablet  Commonly known as: LIPITOR  Take 1 tablet (80 mg total) by mouth every evening.     blood-glucose meter kit  Use as instructed     clopidogreL 75 mg tablet  Commonly known as: PLAVIX  Take 1 tablet (75 mg total) by mouth once daily.  Start taking on: May 25, 2023     insulin aspart U-100 100 unit/mL (3 mL) Inpn pen  Commonly known as: NovoLOG FlexPen U-100 Insulin  Inject 7 Units into the skin 3 (three) times daily with meals.     insulin glargine 100 units/mL SubQ pen  Commonly known as: LANTUS SOLOSTAR U-100 INSULIN  Inject 20 Units into the skin every evening.     losartan 25 MG tablet  Commonly known as: COZAAR  Take 1 tablet (25 mg total) by mouth once daily.  Start  taking on: May 25, 2023     metoprolol succinate 25 MG 24 hr tablet  Commonly known as: TOPROL-XL  Take 1 tablet (25 mg total) by mouth once daily.  Start taking on: May 25, 2023     nitroGLYCERIN 0.4 MG SL tablet  Commonly known as: NITROSTAT  Place 1 tablet (0.4 mg total) under the tongue every 5 (five) minutes as needed for Chest pain.            CONTINUE taking these medications      albuterol 90 mcg/actuation inhaler  Commonly known as: PROVENTIL/VENTOLIN HFA  Inhale 1-2 puffs into the lungs every 6 (six) hours as needed for Wheezing. Rescue     fluticasone propionate 50 mcg/actuation nasal spray  Commonly known as: FLONASE  1 spray (50 mcg total) by Each Nare route 2 (two) times daily as needed.     loratadine 10 mg tablet  Commonly known as: CLARITIN  Take 1 tablet (10 mg total) by mouth once daily.     meloxicam 7.5 MG tablet  Commonly known as: MOBIC  Take 1 tablet (7.5 mg total) by mouth once daily.     orphenadrine 100 mg tablet  Commonly known as: NORFLEX  Take 1 tablet (100 mg total) by mouth 2 (two) times daily. for 4 days            STOP taking these medications      ibuprofen 600 MG tablet  Commonly known as: ADVIL,MOTRIN     metFORMIN 500 MG tablet  Commonly known as: GLUCOPHAGE            Discharge Procedure Orders   Diet general     Diet diabetic     Diet Cardiac     Call MD for:  temperature >100.4     Call MD for:  persistent nausea and vomiting     Call MD for:  severe uncontrolled pain     Call MD for:  difficulty breathing, headache or visual disturbances     Call MD for:  redness, tenderness, or signs of infection (pain, swelling, redness, odor or green/yellow discharge around incision site)     Call MD for:  hives     Call MD for:  persistent dizziness or light-headedness     Call MD for:  extreme fatigue     No dressing needed     Activity as tolerated      Follow-up Information       Jass Gibbs MD Follow up in 3 week(s).    Specialties: Cardiology, Interventional Cardiology  Why:  for planned follow up appointment  Contact information:  [address] [facility] [address]  [address]  [place] [address]  [phone]                               Diet: ADA 2000/cardiac    Activity: ad yohannes.      Time for discharge 35min.

## 2023-08-28 PROBLEM — I21.02 ST ELEVATION MYOCARDIAL INFARCTION INVOLVING LEFT ANTERIOR DESCENDING (LAD) CORONARY ARTERY: Status: RESOLVED | Noted: 2023-05-23 | Resolved: 2023-08-28

## 2024-05-14 ENCOUNTER — HOSPITAL ENCOUNTER (EMERGENCY)
Facility: HOSPITAL | Age: 49
Discharge: HOME | End: 2024-05-15
Attending: EMERGENCY MEDICINE
Payer: MEDICAID

## 2024-05-14 DIAGNOSIS — R51.9 ACUTE NONINTRACTABLE HEADACHE, UNSPECIFIED HEADACHE TYPE: Primary | ICD-10-CM

## 2024-05-14 DIAGNOSIS — E11.65 TYPE 2 DIABETES MELLITUS WITH HYPERGLYCEMIA, WITHOUT LONG-TERM CURRENT USE OF INSULIN: ICD-10-CM

## 2024-05-14 DIAGNOSIS — R07.9 CHEST PAIN: ICD-10-CM

## 2024-05-14 LAB
ALBUMIN SERPL BCP-MCNC: 3.8 G/DL (ref 3.5–5.2)
ALP SERPL-CCNC: 149 U/L (ref 55–135)
ALT SERPL W/O P-5'-P-CCNC: 29 U/L (ref 10–44)
ANION GAP SERPL CALC-SCNC: 8 MMOL/L (ref 8–16)
AST SERPL-CCNC: 23 U/L (ref 10–40)
BASOPHILS # BLD AUTO: 0.05 K/UL (ref 0–0.2)
BASOPHILS NFR BLD: 0.4 % (ref 0–1.9)
BILIRUB SERPL-MCNC: 0.4 MG/DL (ref 0.1–1)
BNP SERPL-MCNC: 17 PG/ML (ref 0–99)
BUN SERPL-MCNC: 12 MG/DL (ref 6–20)
CALCIUM SERPL-MCNC: 10.1 MG/DL (ref 8.7–10.5)
CHLORIDE SERPL-SCNC: 104 MMOL/L (ref 95–110)
CO2 SERPL-SCNC: 25 MMOL/L (ref 23–29)
CREAT SERPL-MCNC: 1 MG/DL (ref 0.5–1.4)
DIFFERENTIAL METHOD BLD: ABNORMAL
EOSINOPHIL # BLD AUTO: 0.2 K/UL (ref 0–0.5)
EOSINOPHIL NFR BLD: 1.7 % (ref 0–8)
ERYTHROCYTE [DISTWIDTH] IN BLOOD BY AUTOMATED COUNT: 11.3 % (ref 11.5–14.5)
EST. GFR  (NO RACE VARIABLE): >60 ML/MIN/1.73 M^2
GLUCOSE SERPL-MCNC: 301 MG/DL (ref 70–110)
HCT VFR BLD AUTO: 42 % (ref 37–48.5)
HGB BLD-MCNC: 14.5 G/DL (ref 12–16)
IMM GRANULOCYTES # BLD AUTO: 0.03 K/UL (ref 0–0.04)
IMM GRANULOCYTES NFR BLD AUTO: 0.3 % (ref 0–0.5)
LYMPHOCYTES # BLD AUTO: 4.3 K/UL (ref 1–4.8)
LYMPHOCYTES NFR BLD: 37.2 % (ref 18–48)
MCH RBC QN AUTO: 29.5 PG (ref 27–31)
MCHC RBC AUTO-ENTMCNC: 34.5 G/DL (ref 32–36)
MCV RBC AUTO: 86 FL (ref 82–98)
MONOCYTES # BLD AUTO: 0.5 K/UL (ref 0.3–1)
MONOCYTES NFR BLD: 4.5 % (ref 4–15)
NEUTROPHILS # BLD AUTO: 6.4 K/UL (ref 1.8–7.7)
NEUTROPHILS NFR BLD: 55.9 % (ref 38–73)
NRBC BLD-RTO: 0 /100 WBC
PLATELET # BLD AUTO: 307 K/UL (ref 150–450)
PMV BLD AUTO: 11.2 FL (ref 9.2–12.9)
POCT GLUCOSE: 312 MG/DL (ref 70–110)
POTASSIUM SERPL-SCNC: 4.5 MMOL/L (ref 3.5–5.1)
PROT SERPL-MCNC: 7.4 G/DL (ref 6–8.4)
RBC # BLD AUTO: 4.91 M/UL (ref 4–5.4)
SODIUM SERPL-SCNC: 137 MMOL/L (ref 136–145)
TROPONIN I SERPL DL<=0.01 NG/ML-MCNC: 0.03 NG/ML (ref 0–0.03)
TROPONIN I SERPL DL<=0.01 NG/ML-MCNC: <0.006 NG/ML (ref 0–0.03)
WBC # BLD AUTO: 11.46 K/UL (ref 3.9–12.7)

## 2024-05-14 PROCEDURE — 96360 HYDRATION IV INFUSION INIT: CPT

## 2024-05-14 PROCEDURE — 93010 ELECTROCARDIOGRAM REPORT: CPT | Mod: ,,, | Performed by: INTERNAL MEDICINE

## 2024-05-14 PROCEDURE — 25000003 PHARM REV CODE 250: Performed by: EMERGENCY MEDICINE

## 2024-05-14 PROCEDURE — 80053 COMPREHEN METABOLIC PANEL: CPT | Performed by: PHYSICIAN ASSISTANT

## 2024-05-14 PROCEDURE — 85025 COMPLETE CBC W/AUTO DIFF WBC: CPT | Performed by: PHYSICIAN ASSISTANT

## 2024-05-14 PROCEDURE — 84484 ASSAY OF TROPONIN QUANT: CPT | Performed by: PHYSICIAN ASSISTANT

## 2024-05-14 PROCEDURE — 82962 GLUCOSE BLOOD TEST: CPT

## 2024-05-14 PROCEDURE — 99285 EMERGENCY DEPT VISIT HI MDM: CPT | Mod: 25

## 2024-05-14 PROCEDURE — 93005 ELECTROCARDIOGRAM TRACING: CPT

## 2024-05-14 PROCEDURE — 83880 ASSAY OF NATRIURETIC PEPTIDE: CPT | Performed by: PHYSICIAN ASSISTANT

## 2024-05-14 RX ADMIN — SODIUM CHLORIDE 1000 ML: 9 INJECTION, SOLUTION INTRAVENOUS at 09:05

## 2024-05-14 NOTE — Clinical Note
"Eusebia WATSON"Imer Del Valle was seen and treated in our emergency department on 5/14/2024.  She may return to work on 05/20/2024.       If you have any questions or concerns, please don't hesitate to call.      Zee Kang MD"

## 2024-05-15 VITALS
OXYGEN SATURATION: 98 % | DIASTOLIC BLOOD PRESSURE: 64 MMHG | HEART RATE: 75 BPM | BODY MASS INDEX: 32.95 KG/M2 | RESPIRATION RATE: 22 BRPM | WEIGHT: 205 LBS | TEMPERATURE: 99 F | SYSTOLIC BLOOD PRESSURE: 119 MMHG | HEIGHT: 66 IN

## 2024-05-15 LAB
OHS QRS DURATION: 88 MS
OHS QTC CALCULATION: 449 MS
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
## 2024-05-15 NOTE — PROVIDER PROGRESS NOTES - EMERGENCY DEPT.
Patient received as sign-out from Dr. Henrry Felton pending repeat troponin.    49 yo female with DM2, CAD s/p stent 2023, prior MI, HTN, DLD, presents s/p episode of chest pain around 6:30pm.      Ddx includes ACS, CHF, musculoskeletal, other.  ?EKG 19:28: NSR, HR 84. Q waves in V2. No ectopy. No STEMI.     CXR NAD.    CBC, CMP reassuring aside from glucose 301.  No anion gap to suggest DKA.  Troponin negative x 2, ruling out ACS.      Patient received IV NS 1L while in ER.  She remained chest pain-free throughout.    I discussed negative workup with patient and she feels comfortable going home.  I have strongly advised f/u to PMD and cardiology.    D/ced. Work note provided.      Zee Kang

## 2024-05-15 NOTE — ED PROVIDER NOTES
Encounter Date: 5/14/2024    SCRIBE #1 NOTE: I, Axel Parminder Do, am scribing for, and in the presence of,  Henrry Felton MD. I have scribed the following portions of the note - Other sections scribed: HPI, ROS, PE, MDM, EKG.       History     Chief Complaint   Patient presents with    Chest Pain    Headache     Pt presents to ED c/o right sided chest pain, intermittent, described as tightness onset 1800 today while at work.  Pt also c/o headache.  Denies sob, dizziness, weakness, n/v or any other symptoms.  Pt reports taking Asa around 1800 with some relief.  Pt report stents placement x 8 months ago.  Hx cardiac and dm.  Pain 7/10.       Eusebia Del Valle is a 48 y.o. female, with a PMHx of MI, HTN, HLD, and T2DM, who presents to the ED with sharp chest pain onset 30 minutes ago. Patient reports associated mild diaphoresis and chest tightness. 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 She tolerated PO prior to this episode. Patient attempted treatment with a baby aspirin with relief. Patient notes compliance with atorvastatin, plavix, losartan, and metoprolol. No other exacerbating or alleviating factors. Patient denies heart palpitations, syncope, or other associated symptoms.     Per chart review 05/23  Angiogram, Coronary, with Left Heart Cath    The history is provided by the patient. No  was used.     Review of patient's allergies indicates:  No Known Allergies  Past Medical History:   Diagnosis Date    Diabetes mellitus      Past Surgical History:   Procedure Laterality Date    ANGIOGRAM, CORONARY, WITH LEFT HEART CATHETERIZATION N/A 5/23/2023    Procedure: Angiogram, Coronary, with Left Heart Cath;  Surgeon: Jass Gibbs MD;  Location: [facility] CATH LAB;  Service: Cardiology;  Laterality: N/A;    IVUS, CORONARY  5/23/2023    Procedure: IVUS, Coronary;  Surgeon: Jass Gibbs MD;  Location: [facility] CATH LAB;  Service: Cardiology;;     PTCA, SINGLE VESSEL  5/23/2023    Procedure: PTCA, Single Vessel;  Surgeon: Jass Gibbs MD;  Location: [facility] CATH LAB;  Service: Cardiology;;    STENT, DRUG ELUTING, SINGLE VESSEL, CORONARY  5/23/2023    Procedure: Stent, Drug Eluting, Single Vessel, Coronary;  Surgeon: Jass Gibbs MD;  Location: [facility] CATH LAB;  Service: Cardiology;;     No family history on file.  Social History     Tobacco Use    Smoking status: Never    Smokeless tobacco: Never   Substance Use Topics    Alcohol use: No    Drug use: No     Review of Systems   Constitutional:  Positive for diaphoresis. Negative for chills and fever.   HENT:  Negative for sore throat.    Respiratory:  Positive for chest tightness. Negative for cough and shortness of breath.    Cardiovascular:  Positive for chest pain. Negative for palpitations.   Gastrointestinal:  Negative for nausea.   Genitourinary:  Negative for dysuria.   Musculoskeletal:  Negative for back pain.   Skin:  Negative for rash.   Neurological:  Negative for syncope and weakness.   Psychiatric/Behavioral:  Negative for confusion.        Physical Exam     Initial Vitals [05/14/24 1932]   BP Pulse Resp Temp SpO2   136/87 85 18 98.6 °F (37 °C) 100 %      MAP       --         Physical Exam    Nursing note and vitals reviewed.  Constitutional: She appears well-developed and well-nourished.   Eyes: EOM are normal. Pupils are equal, round, and reactive to light.   Neck: Neck supple. No thyromegaly present. No JVD present.   Normal range of motion.  Cardiovascular:  Normal rate, regular rhythm, normal heart sounds and intact distal pulses.     Exam reveals no gallop and no friction rub.       No murmur heard.  Pulmonary/Chest: Breath sounds normal. No respiratory distress.   Abdominal: Abdomen is soft. Bowel sounds are normal.   Musculoskeletal:         General: No tenderness or edema. Normal range of motion.      Cervical back: Normal range of motion and neck supple.     Neurological: She is  alert and oriented to person, place, and time. She has normal strength.   Skin: Skin is warm and dry.         ED Course   Procedures  Labs Reviewed   CBC W/ AUTO DIFFERENTIAL - Abnormal; Notable for the following components:       Result Value    RDW 11.3 (*)     All other components within normal limits   COMPREHENSIVE METABOLIC PANEL - Abnormal; Notable for the following components:    Glucose 301 (*)     Alkaline Phosphatase 149 (*)     All other components within normal limits   POCT GLUCOSE - Abnormal; Notable for the following components:    POCT Glucose 312 (*)     All other components within normal limits   TROPONIN I   B-TYPE NATRIURETIC PEPTIDE     EKG Readings: (Independently Interpreted)   Initial Reading: No STEMI. Rhythm: Normal Sinus Rhythm. Heart Rate: 84. Ectopy: No Ectopy. Conduction: Normal. ST Segments: Normal ST Segments. T Waves: Normal. Clinical Impression: Normal Sinus Rhythm   Low voltage QRS. Septal infarct.       Imaging Results              X-Ray Chest AP Portable (Final result)  Result time 05/14/24 19:44:32      Final result by Eli Livingston MD (05/14/24 19:44:32)                   Impression:      No acute cardiopulmonary process identified.      Electronically signed by: Eli Livingston MD  Date:    05/14/2024  Time:    19:44               Narrative:    EXAMINATION:  XR CHEST AP PORTABLE    CLINICAL HISTORY:  Chest Pain;    TECHNIQUE:  Single frontal view of the chest was performed.    COMPARISON:  May 2023.    FINDINGS:  Cardiac silhouette is normal in size.  Lungs are symmetrically expanded.  No evidence of focal consolidative process, pneumothorax, or significant pleural effusion.  No acute osseous abnormality identified.                                       Medications   sodium chloride 0.9% bolus 1,000 mL 1,000 mL (1,000 mLs Intravenous New Bag 5/14/24 2118)     Medical Decision Making  Differential Diagnosis includes, but is not limited to: ACS/MI, PE, aortic dissection,  pneumothorax, cardiac tamponade, pericarditis/myocarditis, pneumonia, infection/abscess, lung mass, trauma/fracture, costochondritis/pleurisy, MSK pain/contusion, GERD, biliary disease, pancreatitis, anemia.    This is an emergent evaluation of a 48 y.o. female with a PMHx of MI, HTN, HLD, and T2DM, who presents with sharp chest pain with associated chest tightness and mild diaphoresis onset 30 minutes ago. The patient was seen and examined. The history and physical exam was obtained. The nursing notes and vital signs were reviewed. Secondary to symptoms and examination findings, I ordered labs, Chest X-Ray, and EKG. Will treat IV fluids.    Amount and/or Complexity of Data Reviewed  External Data Reviewed: notes.     Details: See HPI.  Labs: ordered.  Radiology: ordered.  ECG/medicine tests: ordered.    Patient has not had any chest pain while she is here.  She has had no other symptomatology.  Workup so far is normal.  Repeat troponin due at 10:30 p.m.. Pt turned over to Dr. Kang to follow, reassess and provide disposition.         Scribe Attestation:   Scribe #1: I performed the above scribed service and the documentation accurately describes the services I performed. I attest to the accuracy of the note.              I, Henrry Felton, personally performed the services described in this documentation.  All medical record entries made by the scribe were at my direction and in my presence.  I have reviewed the chart and agree that the record reflects my personal performance and is accurate and complete.                   Clinical Impression:  Final diagnoses:  [R07.9] Chest pain                 Henrry Felton MD  05/14/24 6942

## 2024-05-15 NOTE — ED NOTES
Bed: 09main  Expected date:   Expected time:   Means of arrival: Personal Transportation  Comments:

## 2024-05-24 ENCOUNTER — HOSPITAL ENCOUNTER (INPATIENT)
Facility: HOSPITAL | Age: 49
LOS: 2 days | Discharge: HOME | DRG: 250 | End: 2024-05-26
Attending: EMERGENCY MEDICINE | Admitting: INTERNAL MEDICINE
Payer: MEDICAID

## 2024-05-24 DIAGNOSIS — I21.3 STEMI (ST ELEVATION MYOCARDIAL INFARCTION): Primary | ICD-10-CM

## 2024-05-24 DIAGNOSIS — R07.9 CHEST PAIN: ICD-10-CM

## 2024-05-24 DIAGNOSIS — I95.9 HYPOTENSION: ICD-10-CM

## 2024-05-24 PROBLEM — I10 ESSENTIAL HYPERTENSION: Status: ACTIVE | Noted: 2024-05-24

## 2024-05-24 LAB
ABO + RH BLD: NORMAL
ALBUMIN SERPL BCP-MCNC: 3.9 G/DL (ref 3.5–5.2)
ALP SERPL-CCNC: 130 U/L (ref 55–135)
ALT SERPL W/O P-5'-P-CCNC: 37 U/L (ref 10–44)
ANION GAP SERPL CALC-SCNC: 12 MMOL/L (ref 8–16)
AST SERPL-CCNC: 36 U/L (ref 10–40)
BASOPHILS # BLD AUTO: 0.04 K/UL (ref 0–0.2)
BASOPHILS NFR BLD: 0.3 % (ref 0–1.9)
BILIRUB SERPL-MCNC: 0.6 MG/DL (ref 0.1–1)
BLD GP AB SCN CELLS X3 SERPL QL: NORMAL
BUN SERPL-MCNC: 9 MG/DL (ref 6–20)
CALCIUM SERPL-MCNC: 9.6 MG/DL (ref 8.7–10.5)
CHLORIDE SERPL-SCNC: 104 MMOL/L (ref 95–110)
CO2 SERPL-SCNC: 21 MMOL/L (ref 23–29)
CREAT SERPL-MCNC: 0.9 MG/DL (ref 0.5–1.4)
DIFFERENTIAL METHOD BLD: ABNORMAL
EOSINOPHIL # BLD AUTO: 0.1 K/UL (ref 0–0.5)
EOSINOPHIL NFR BLD: 1 % (ref 0–8)
ERYTHROCYTE [DISTWIDTH] IN BLOOD BY AUTOMATED COUNT: 11.2 % (ref 11.5–14.5)
EST. GFR  (NO RACE VARIABLE): >60 ML/MIN/1.73 M^2
GLUCOSE SERPL-MCNC: 322 MG/DL (ref 70–110)
HCG INTACT+B SERPL-ACNC: <1.2 MIU/ML
HCT VFR BLD AUTO: 44 % (ref 37–48.5)
HGB BLD-MCNC: 15.6 G/DL (ref 12–16)
IMM GRANULOCYTES # BLD AUTO: 0.03 K/UL (ref 0–0.04)
IMM GRANULOCYTES NFR BLD AUTO: 0.2 % (ref 0–0.5)
LYMPHOCYTES # BLD AUTO: 2.8 K/UL (ref 1–4.8)
LYMPHOCYTES NFR BLD: 21.7 % (ref 18–48)
MAGNESIUM SERPL-MCNC: 2.1 MG/DL (ref 1.6–2.6)
MCH RBC QN AUTO: 29.9 PG (ref 27–31)
MCHC RBC AUTO-ENTMCNC: 35.5 G/DL (ref 32–36)
MCV RBC AUTO: 84 FL (ref 82–98)
MONOCYTES # BLD AUTO: 0.4 K/UL (ref 0.3–1)
MONOCYTES NFR BLD: 3.4 % (ref 4–15)
NEUTROPHILS # BLD AUTO: 9.3 K/UL (ref 1.8–7.7)
NEUTROPHILS NFR BLD: 73.4 % (ref 38–73)
NRBC BLD-RTO: 0 /100 WBC
PLATELET # BLD AUTO: 300 K/UL (ref 150–450)
PMV BLD AUTO: 11.6 FL (ref 9.2–12.9)
POC ACTIVATED CLOTTING TIME K: 174 SEC (ref 74–137)
POC ACTIVATED CLOTTING TIME K: 217 SEC (ref 74–137)
POCT GLUCOSE: 275 MG/DL (ref 70–110)
POTASSIUM SERPL-SCNC: 4.3 MMOL/L (ref 3.5–5.1)
PROT SERPL-MCNC: 7.8 G/DL (ref 6–8.4)
RBC # BLD AUTO: 5.22 M/UL (ref 4–5.4)
SAMPLE: ABNORMAL
SAMPLE: ABNORMAL
SODIUM SERPL-SCNC: 137 MMOL/L (ref 136–145)
SPECIMEN OUTDATE: NORMAL
TROPONIN I SERPL DL<=0.01 NG/ML-MCNC: 0.2 NG/ML (ref 0–0.03)
TROPONIN I SERPL DL<=0.01 NG/ML-MCNC: 1.47 NG/ML (ref 0–0.03)
WBC # BLD AUTO: 12.69 K/UL (ref 3.9–12.7)

## 2024-05-24 PROCEDURE — 63600175 PHARM REV CODE 636 W HCPCS

## 2024-05-24 PROCEDURE — 93010 ELECTROCARDIOGRAM REPORT: CPT | Mod: 59,,, | Performed by: INTERNAL MEDICINE

## 2024-05-24 PROCEDURE — C1769 GUIDE WIRE: HCPCS | Performed by: INTERNAL MEDICINE

## 2024-05-24 PROCEDURE — 25000242 PHARM REV CODE 250 ALT 637 W/ HCPCS: Performed by: EMERGENCY MEDICINE

## 2024-05-24 PROCEDURE — 27201423 OPTIME MED/SURG SUP & DEVICES STERILE SUPPLY: Performed by: INTERNAL MEDICINE

## 2024-05-24 PROCEDURE — 99291 CRITICAL CARE FIRST HOUR: CPT

## 2024-05-24 PROCEDURE — 93458 L HRT ARTERY/VENTRICLE ANGIO: CPT | Mod: 26,51,59, | Performed by: INTERNAL MEDICINE

## 2024-05-24 PROCEDURE — 92978 ENDOLUMINL IVUS OCT C 1ST: CPT | Mod: 26,LD,, | Performed by: INTERNAL MEDICINE

## 2024-05-24 PROCEDURE — 25000003 PHARM REV CODE 250

## 2024-05-24 PROCEDURE — B240ZZ3 ULTRASONOGRAPHY OF SINGLE CORONARY ARTERY, INTRAVASCULAR: ICD-10-PCS | Performed by: EMERGENCY MEDICINE

## 2024-05-24 PROCEDURE — 92978 ENDOLUMINL IVUS OCT C 1ST: CPT | Mod: LD | Performed by: INTERNAL MEDICINE

## 2024-05-24 PROCEDURE — 25000003 PHARM REV CODE 250: Performed by: EMERGENCY MEDICINE

## 2024-05-24 PROCEDURE — 63600175 PHARM REV CODE 636 W HCPCS: Performed by: INTERNAL MEDICINE

## 2024-05-24 PROCEDURE — 93005 ELECTROCARDIOGRAM TRACING: CPT

## 2024-05-24 PROCEDURE — 4A023N7 MEASUREMENT OF CARDIAC SAMPLING AND PRESSURE, LEFT HEART, PERCUTANEOUS APPROACH: ICD-10-PCS | Performed by: EMERGENCY MEDICINE

## 2024-05-24 PROCEDURE — 99291 CRITICAL CARE FIRST HOUR: CPT | Mod: ,,, | Performed by: INTERNAL MEDICINE

## 2024-05-24 PROCEDURE — 25000242 PHARM REV CODE 250 ALT 637 W/ HCPCS

## 2024-05-24 PROCEDURE — 92920 PRQ TRLUML C ANGIOP 1ART&/BR: CPT | Mod: LD,,, | Performed by: INTERNAL MEDICINE

## 2024-05-24 PROCEDURE — 85025 COMPLETE CBC W/AUTO DIFF WBC: CPT | Performed by: EMERGENCY MEDICINE

## 2024-05-24 PROCEDURE — 25000003 PHARM REV CODE 250: Performed by: INTERNAL MEDICINE

## 2024-05-24 PROCEDURE — C1887 CATHETER, GUIDING: HCPCS | Performed by: INTERNAL MEDICINE

## 2024-05-24 PROCEDURE — 80053 COMPREHEN METABOLIC PANEL: CPT | Performed by: EMERGENCY MEDICINE

## 2024-05-24 PROCEDURE — 83735 ASSAY OF MAGNESIUM: CPT | Performed by: EMERGENCY MEDICINE

## 2024-05-24 PROCEDURE — 84702 CHORIONIC GONADOTROPIN TEST: CPT | Performed by: EMERGENCY MEDICINE

## 2024-05-24 PROCEDURE — 36415 COLL VENOUS BLD VENIPUNCTURE: CPT | Performed by: EMERGENCY MEDICINE

## 2024-05-24 PROCEDURE — 96374 THER/PROPH/DIAG INJ IV PUSH: CPT

## 2024-05-24 PROCEDURE — 20000000 HC ICU ROOM

## 2024-05-24 PROCEDURE — C1894 INTRO/SHEATH, NON-LASER: HCPCS | Performed by: INTERNAL MEDICINE

## 2024-05-24 PROCEDURE — B2111ZZ FLUOROSCOPY OF MULTIPLE CORONARY ARTERIES USING LOW OSMOLAR CONTRAST: ICD-10-PCS | Performed by: EMERGENCY MEDICINE

## 2024-05-24 PROCEDURE — 92920 PRQ TRLUML C ANGIOP 1ART&/BR: CPT | Mod: LD | Performed by: INTERNAL MEDICINE

## 2024-05-24 PROCEDURE — 25500020 PHARM REV CODE 255: Performed by: INTERNAL MEDICINE

## 2024-05-24 PROCEDURE — C1753 CATH, INTRAVAS ULTRASOUND: HCPCS | Performed by: INTERNAL MEDICINE

## 2024-05-24 PROCEDURE — 84484 ASSAY OF TROPONIN QUANT: CPT | Mod: 91 | Performed by: EMERGENCY MEDICINE

## 2024-05-24 PROCEDURE — 93458 L HRT ARTERY/VENTRICLE ANGIO: CPT | Mod: 59 | Performed by: INTERNAL MEDICINE

## 2024-05-24 PROCEDURE — C1725 CATH, TRANSLUMIN NON-LASER: HCPCS | Performed by: INTERNAL MEDICINE

## 2024-05-24 PROCEDURE — 86901 BLOOD TYPING SEROLOGIC RH(D): CPT | Performed by: HOSPITALIST

## 2024-05-24 PROCEDURE — 02703ZZ DILATION OF CORONARY ARTERY, ONE ARTERY, PERCUTANEOUS APPROACH: ICD-10-PCS | Performed by: EMERGENCY MEDICINE

## 2024-05-24 RX ORDER — HEPARIN SODIUM 200 [USP'U]/100ML
INJECTION, SOLUTION INTRAVENOUS
Status: DISCONTINUED | OUTPATIENT
Start: 2024-05-24 | End: 2024-05-26 | Stop reason: HOSPADM

## 2024-05-24 RX ORDER — HEPARIN SODIUM 5000 [USP'U]/ML
INJECTION, SOLUTION INTRAVENOUS; SUBCUTANEOUS
Status: COMPLETED
Start: 2024-05-24 | End: 2024-05-24

## 2024-05-24 RX ORDER — SEMAGLUTIDE 0.68 MG/ML
0.5 INJECTION, SOLUTION SUBCUTANEOUS
COMMUNITY

## 2024-05-24 RX ORDER — ONDANSETRON HYDROCHLORIDE 2 MG/ML
4 INJECTION, SOLUTION INTRAVENOUS EVERY 12 HOURS PRN
Status: DISCONTINUED | OUTPATIENT
Start: 2024-05-24 | End: 2024-05-26 | Stop reason: HOSPADM

## 2024-05-24 RX ORDER — LOSARTAN POTASSIUM 25 MG/1
25 TABLET ORAL DAILY
Status: DISCONTINUED | OUTPATIENT
Start: 2024-05-25 | End: 2024-05-26 | Stop reason: HOSPADM

## 2024-05-24 RX ORDER — NITROGLYCERIN 0.4 MG/1
TABLET SUBLINGUAL
Status: COMPLETED
Start: 2024-05-24 | End: 2024-05-24

## 2024-05-24 RX ORDER — ATORVASTATIN CALCIUM 40 MG/1
80 TABLET, FILM COATED ORAL NIGHTLY
Status: DISCONTINUED | OUTPATIENT
Start: 2024-05-24 | End: 2024-05-26 | Stop reason: HOSPADM

## 2024-05-24 RX ORDER — TALC
6 POWDER (GRAM) TOPICAL NIGHTLY PRN
Status: DISCONTINUED | OUTPATIENT
Start: 2024-05-24 | End: 2024-05-26 | Stop reason: HOSPADM

## 2024-05-24 RX ORDER — IBUPROFEN 200 MG
16 TABLET ORAL
Status: DISCONTINUED | OUTPATIENT
Start: 2024-05-24 | End: 2024-05-26 | Stop reason: HOSPADM

## 2024-05-24 RX ORDER — NAPROXEN SODIUM 220 MG/1
81 TABLET, FILM COATED ORAL DAILY
Status: DISCONTINUED | OUTPATIENT
Start: 2024-05-25 | End: 2024-05-26 | Stop reason: HOSPADM

## 2024-05-24 RX ORDER — HEPARIN SODIUM,PORCINE/D5W 25000/250
0-40 INTRAVENOUS SOLUTION INTRAVENOUS CONTINUOUS
Status: DISCONTINUED | OUTPATIENT
Start: 2024-05-24 | End: 2024-05-24

## 2024-05-24 RX ORDER — LIDOCAINE HYDROCHLORIDE 10 MG/ML
INJECTION, SOLUTION EPIDURAL; INFILTRATION; INTRACAUDAL; PERINEURAL
Status: DISCONTINUED | OUTPATIENT
Start: 2024-05-24 | End: 2024-05-24 | Stop reason: HOSPADM

## 2024-05-24 RX ORDER — NITROGLYCERIN 0.4 MG/1
0.4 TABLET SUBLINGUAL EVERY 5 MIN PRN
Status: DISCONTINUED | OUTPATIENT
Start: 2024-05-24 | End: 2024-05-26 | Stop reason: HOSPADM

## 2024-05-24 RX ORDER — ONDANSETRON HYDROCHLORIDE 2 MG/ML
4 INJECTION, SOLUTION INTRAVENOUS EVERY 6 HOURS PRN
Status: DISCONTINUED | OUTPATIENT
Start: 2024-05-24 | End: 2024-05-26 | Stop reason: HOSPADM

## 2024-05-24 RX ORDER — HEPARIN SODIUM 1000 [USP'U]/ML
INJECTION, SOLUTION INTRAVENOUS; SUBCUTANEOUS
Status: DISCONTINUED | OUTPATIENT
Start: 2024-05-24 | End: 2024-05-24 | Stop reason: HOSPADM

## 2024-05-24 RX ORDER — IBUPROFEN 200 MG
24 TABLET ORAL
Status: DISCONTINUED | OUTPATIENT
Start: 2024-05-24 | End: 2024-05-26 | Stop reason: HOSPADM

## 2024-05-24 RX ORDER — ALUMINUM HYDROXIDE, MAGNESIUM HYDROXIDE, AND SIMETHICONE 1200; 120; 1200 MG/30ML; MG/30ML; MG/30ML
30 SUSPENSION ORAL 4 TIMES DAILY PRN
Status: DISCONTINUED | OUTPATIENT
Start: 2024-05-24 | End: 2024-05-26 | Stop reason: HOSPADM

## 2024-05-24 RX ORDER — ACETAMINOPHEN 325 MG/1
650 TABLET ORAL EVERY 4 HOURS PRN
Status: DISCONTINUED | OUTPATIENT
Start: 2024-05-24 | End: 2024-05-26 | Stop reason: HOSPADM

## 2024-05-24 RX ORDER — METOPROLOL SUCCINATE 25 MG/1
25 TABLET, EXTENDED RELEASE ORAL DAILY
Status: DISCONTINUED | OUTPATIENT
Start: 2024-05-25 | End: 2024-05-26 | Stop reason: HOSPADM

## 2024-05-24 RX ORDER — SIMETHICONE 80 MG
1 TABLET,CHEWABLE ORAL 4 TIMES DAILY PRN
Status: DISCONTINUED | OUTPATIENT
Start: 2024-05-24 | End: 2024-05-26 | Stop reason: HOSPADM

## 2024-05-24 RX ORDER — GLUCAGON 1 MG
1 KIT INJECTION
Status: DISCONTINUED | OUTPATIENT
Start: 2024-05-24 | End: 2024-05-26 | Stop reason: HOSPADM

## 2024-05-24 RX ORDER — NALOXONE HCL 0.4 MG/ML
0.02 VIAL (ML) INJECTION
Status: DISCONTINUED | OUTPATIENT
Start: 2024-05-24 | End: 2024-05-26 | Stop reason: HOSPADM

## 2024-05-24 RX ORDER — POLYETHYLENE GLYCOL 3350 17 G/17G
17 POWDER, FOR SOLUTION ORAL 2 TIMES DAILY PRN
Status: DISCONTINUED | OUTPATIENT
Start: 2024-05-24 | End: 2024-05-26 | Stop reason: HOSPADM

## 2024-05-24 RX ORDER — MORPHINE SULFATE 4 MG/ML
3 INJECTION, SOLUTION INTRAMUSCULAR; INTRAVENOUS
Status: DISCONTINUED | OUTPATIENT
Start: 2024-05-24 | End: 2024-05-26 | Stop reason: HOSPADM

## 2024-05-24 RX ORDER — PHENYLEPHRINE HYDROCHLORIDE 10 MG/ML
INJECTION INTRAVENOUS
Status: DISCONTINUED | OUTPATIENT
Start: 2024-05-24 | End: 2024-05-24 | Stop reason: HOSPADM

## 2024-05-24 RX ORDER — FENTANYL CITRATE 50 UG/ML
INJECTION, SOLUTION INTRAMUSCULAR; INTRAVENOUS
Status: DISCONTINUED | OUTPATIENT
Start: 2024-05-24 | End: 2024-05-24 | Stop reason: HOSPADM

## 2024-05-24 RX ORDER — SODIUM CHLORIDE 0.9 % (FLUSH) 0.9 %
10 SYRINGE (ML) INJECTION EVERY 12 HOURS PRN
Status: DISCONTINUED | OUTPATIENT
Start: 2024-05-24 | End: 2024-05-26 | Stop reason: HOSPADM

## 2024-05-24 RX ORDER — HEPARIN SODIUM 5000 [USP'U]/ML
5000 INJECTION, SOLUTION INTRAVENOUS; SUBCUTANEOUS
Status: COMPLETED | OUTPATIENT
Start: 2024-05-24 | End: 2024-05-24

## 2024-05-24 RX ORDER — INSULIN ASPART 100 [IU]/ML
0-5 INJECTION, SOLUTION INTRAVENOUS; SUBCUTANEOUS
Status: DISCONTINUED | OUTPATIENT
Start: 2024-05-24 | End: 2024-05-26 | Stop reason: HOSPADM

## 2024-05-24 RX ORDER — OXYCODONE HYDROCHLORIDE 5 MG/1
5 TABLET ORAL EVERY 4 HOURS PRN
Status: DISCONTINUED | OUTPATIENT
Start: 2024-05-24 | End: 2024-05-26 | Stop reason: HOSPADM

## 2024-05-24 RX ADMIN — HEPARIN SODIUM 5000 UNITS: 5000 INJECTION, SOLUTION INTRAVENOUS; SUBCUTANEOUS at 08:05

## 2024-05-24 RX ADMIN — NITROGLYCERIN 0.4 MG: 0.4 TABLET SUBLINGUAL at 08:05

## 2024-05-24 RX ADMIN — ONDANSETRON 4 MG: 2 INJECTION INTRAMUSCULAR; INTRAVENOUS at 10:05

## 2024-05-24 RX ADMIN — ATORVASTATIN CALCIUM 80 MG: 40 TABLET, FILM COATED ORAL at 10:05

## 2024-05-24 RX ADMIN — SODIUM CHLORIDE 1000 ML: 9 INJECTION, SOLUTION INTRAVENOUS at 08:05

## 2024-05-24 RX ADMIN — INSULIN ASPART 1 UNITS: 100 INJECTION, SOLUTION INTRAVENOUS; SUBCUTANEOUS at 10:05

## 2024-05-24 RX ADMIN — HEPARIN SODIUM 5000 UNITS: 5000 INJECTION INTRAVENOUS; SUBCUTANEOUS at 08:05

## 2024-05-24 RX ADMIN — TICAGRELOR 180 MG: 90 TABLET ORAL at 08:05

## 2024-05-24 NOTE — LETTER
May 26, 2024         Alana ROBLERO  [facility]  [place] [address]  Phone: [phone]       Patient: Eusebia Del Valle   YOB: 1975  Date of Visit: 05/26/2024    To Whom It May Concern:    Laura Del Valle  was discharged from critical care [facility] on 05/26/2024. The patient may return to work on 06/03/2024 with no restrictions. If you have any questions or concerns, or if I can be of further assistance, please do not hesitate to contact me.    Sincerely,    Anya Andrade RN

## 2024-05-24 NOTE — Clinical Note
The catheter was inserted into the, was removed from the and was inserted over the wire into the proximal   left anterior descending.

## 2024-05-24 NOTE — Clinical Note
Diagnosis: Chest pain [869012]   Future Attending Provider: LUIS ROE [[id number]]   Reason for IP Medical Treatment  (Clinical interventions that can only be accomplished in the IP setting? ) :: cardiac cath after care/monitoring   I certify that Inpatient services for greater than or equal to 2 midnights are medically necessary:: Yes   Plans for Post-Acute care--if anticipated (pick the single best option):: A. No post acute care anticipated at this time

## 2024-05-24 NOTE — Clinical Note
The catheter insertion attempt was made into the ostium   left main. An angiography was performed of the left coronary arteries. Multiple views were taken.

## 2024-05-24 NOTE — Clinical Note
The catheter was inserted into the, was removed from the and was inserted over the wire into the proximal   left anterior descending. IVUS performed.

## 2024-05-24 NOTE — LETTER
May 26, 2024         Alana ROBLERO  [facility]  [place] [address]  Phone: [phone]       Patient: Eusebia Del Valle   YOB: 1975  Date of Visit: 05/26/2024    To Whom It May Concern:    Laura Del Valle  was discharged from Critical Care at [facility] on 05/26/2024. The patient may return to work on 06/03/2024 with no restrictions. If you have any questions or concerns, or if I can be of further assistance, please do not hesitate to contact me.    Sincerely,    Anya Andrade RN

## 2024-05-25 LAB
ANION GAP SERPL CALC-SCNC: 9 MMOL/L (ref 8–16)
ASCENDING AORTA: 2.56 CM
AV INDEX (PROSTH): 0.64
AV MEAN GRADIENT: 5 MMHG
AV PEAK GRADIENT: 8 MMHG
AV VALVE AREA BY VELOCITY RATIO: 2.03 CM²
AV VALVE AREA: 1.99 CM²
AV VELOCITY RATIO: 0.64
BASOPHILS # BLD AUTO: 0.02 K/UL (ref 0–0.2)
BASOPHILS NFR BLD: 0.1 % (ref 0–1.9)
BSA FOR ECHO PROCEDURE: 2.09 M2
BUN SERPL-MCNC: 9 MG/DL (ref 6–20)
CALCIUM SERPL-MCNC: 9.2 MG/DL (ref 8.7–10.5)
CHLORIDE SERPL-SCNC: 107 MMOL/L (ref 95–110)
CHOLEST SERPL-MCNC: 172 MG/DL (ref 120–199)
CHOLEST/HDLC SERPL: 5.7 {RATIO} (ref 2–5)
CO2 SERPL-SCNC: 22 MMOL/L (ref 23–29)
CREAT SERPL-MCNC: 0.8 MG/DL (ref 0.5–1.4)
CV ECHO LV RWT: 0.39 CM
DIFFERENTIAL METHOD BLD: ABNORMAL
DOP CALC AO PEAK VEL: 1.38 M/S
DOP CALC AO VTI: 26.6 CM
DOP CALC LVOT AREA: 3.1 CM2
DOP CALC LVOT DIAMETER: 2 CM
DOP CALC LVOT PEAK VEL: 0.89 M/S
DOP CALC LVOT STROKE VOLUME: 53.07 CM3
DOP CALC MV VTI: 19 CM
DOP CALCLVOT PEAK VEL VTI: 16.9 CM
E WAVE DECELERATION TIME: 177.53 MSEC
E/A RATIO: 0.7
E/E' RATIO: 8.92 M/S
ECHO LV POSTERIOR WALL: 1.02 CM (ref 0.6–1.1)
EOSINOPHIL # BLD AUTO: 0 K/UL (ref 0–0.5)
EOSINOPHIL NFR BLD: 0.1 % (ref 0–8)
ERYTHROCYTE [DISTWIDTH] IN BLOOD BY AUTOMATED COUNT: 11.2 % (ref 11.5–14.5)
EST. GFR  (NO RACE VARIABLE): >60 ML/MIN/1.73 M^2
ESTIMATED AVG GLUCOSE: 217 MG/DL (ref 68–131)
FRACTIONAL SHORTENING: 29 % (ref 28–44)
GLUCOSE SERPL-MCNC: 306 MG/DL (ref 70–110)
HBA1C MFR BLD: 9.2 % (ref 4–5.6)
HCT VFR BLD AUTO: 42.5 % (ref 37–48.5)
HDLC SERPL-MCNC: 30 MG/DL (ref 40–75)
HDLC SERPL: 17.4 % (ref 20–50)
HGB BLD-MCNC: 14.3 G/DL (ref 12–16)
IMM GRANULOCYTES # BLD AUTO: 0.06 K/UL (ref 0–0.04)
IMM GRANULOCYTES NFR BLD AUTO: 0.4 % (ref 0–0.5)
INTERVENTRICULAR SEPTUM: 1.01 CM (ref 0.6–1.1)
IVC DIAMETER: 1.66 CM
LA MAJOR: 4.62 CM
LA MINOR: 4.38 CM
LA WIDTH: 3.7 CM
LDLC SERPL CALC-MCNC: 123 MG/DL (ref 63–159)
LEFT ATRIUM SIZE: 3.27 CM
LEFT ATRIUM VOLUME INDEX: 22.8 ML/M2
LEFT ATRIUM VOLUME: 46.25 CM3
LEFT INTERNAL DIMENSION IN SYSTOLE: 3.69 CM (ref 2.1–4)
LEFT VENTRICLE DIASTOLIC VOLUME INDEX: 64.08 ML/M2
LEFT VENTRICLE DIASTOLIC VOLUME: 130.09 ML
LEFT VENTRICLE MASS INDEX: 98 G/M2
LEFT VENTRICLE SYSTOLIC VOLUME INDEX: 28.4 ML/M2
LEFT VENTRICLE SYSTOLIC VOLUME: 57.62 ML
LEFT VENTRICULAR INTERNAL DIMENSION IN DIASTOLE: 5.21 CM (ref 3.5–6)
LEFT VENTRICULAR MASS: 198.68 G
LV LATERAL E/E' RATIO: 9.67 M/S
LV SEPTAL E/E' RATIO: 8.29 M/S
LVOT MG: 1.74 MMHG
LVOT MV: 0.59 CM/S
LYMPHOCYTES # BLD AUTO: 1.4 K/UL (ref 1–4.8)
LYMPHOCYTES NFR BLD: 10.5 % (ref 18–48)
MCH RBC QN AUTO: 29.4 PG (ref 27–31)
MCHC RBC AUTO-ENTMCNC: 33.6 G/DL (ref 32–36)
MCV RBC AUTO: 87 FL (ref 82–98)
MONOCYTES # BLD AUTO: 0.3 K/UL (ref 0.3–1)
MONOCYTES NFR BLD: 2.4 % (ref 4–15)
MV MEAN GRADIENT: 1 MMHG
MV PEAK A VEL: 0.83 M/S
MV PEAK E VEL: 0.58 M/S
MV PEAK GRADIENT: 4 MMHG
MV STENOSIS PRESSURE HALF TIME: 51.48 MS
MV VALVE AREA BY CONTINUITY EQUATION: 2.79 CM2
MV VALVE AREA P 1/2 METHOD: 4.27 CM2
NEUTROPHILS # BLD AUTO: 11.7 K/UL (ref 1.8–7.7)
NEUTROPHILS NFR BLD: 86.5 % (ref 38–73)
NONHDLC SERPL-MCNC: 142 MG/DL
NRBC BLD-RTO: 0 /100 WBC
OHS CV RV/LV RATIO: 0.52 CM
PISA TR MAX VEL: 2.3 M/S
PLATELET # BLD AUTO: 299 K/UL (ref 150–450)
PMV BLD AUTO: 11.4 FL (ref 9.2–12.9)
POCT GLUCOSE: 219 MG/DL (ref 70–110)
POCT GLUCOSE: 250 MG/DL (ref 70–110)
POCT GLUCOSE: 315 MG/DL (ref 70–110)
POCT GLUCOSE: 337 MG/DL (ref 70–110)
POTASSIUM SERPL-SCNC: 4.1 MMOL/L (ref 3.5–5.1)
PV PEAK GRADIENT: 3 MMHG
PV PEAK VELOCITY: 0.91 M/S
RA MAJOR: 4.41 CM
RA PRESSURE ESTIMATED: 3 MMHG
RA WIDTH: 3 CM
RBC # BLD AUTO: 4.86 M/UL (ref 4–5.4)
RIGHT VENTRICULAR END-DIASTOLIC DIMENSION: 2.73 CM
RV TB RVSP: 5 MMHG
RV TISSUE DOPPLER FREE WALL SYSTOLIC VELOCITY 1 (APICAL 4 CHAMBER VIEW): 9.86 CM/S
SINUS: 2.77 CM
SODIUM SERPL-SCNC: 138 MMOL/L (ref 136–145)
STJ: 2.45 CM
TDI LATERAL: 0.06 M/S
TDI SEPTAL: 0.07 M/S
TDI: 0.07 M/S
TR MAX PG: 21 MMHG
TRICUSPID ANNULAR PLANE SYSTOLIC EXCURSION: 1.75 CM
TRIGL SERPL-MCNC: 95 MG/DL (ref 30–150)
TROPONIN I SERPL DL<=0.01 NG/ML-MCNC: 7.78 NG/ML (ref 0–0.03)
TSH SERPL DL<=0.005 MIU/L-ACNC: 0.86 UIU/ML (ref 0.4–4)
TV REST PULMONARY ARTERY PRESSURE: 24 MMHG
WBC # BLD AUTO: 13.52 K/UL (ref 3.9–12.7)
Z-SCORE OF LEFT VENTRICULAR DIMENSION IN END DIASTOLE: -1.44
Z-SCORE OF LEFT VENTRICULAR DIMENSION IN END SYSTOLE: 0

## 2024-05-25 PROCEDURE — 85025 COMPLETE CBC W/AUTO DIFF WBC: CPT | Performed by: INTERNAL MEDICINE

## 2024-05-25 PROCEDURE — 80048 BASIC METABOLIC PNL TOTAL CA: CPT | Performed by: INTERNAL MEDICINE

## 2024-05-25 PROCEDURE — 84484 ASSAY OF TROPONIN QUANT: CPT | Performed by: INTERNAL MEDICINE

## 2024-05-25 PROCEDURE — 99291 CRITICAL CARE FIRST HOUR: CPT | Mod: ,,, | Performed by: INTERNAL MEDICINE

## 2024-05-25 PROCEDURE — 84443 ASSAY THYROID STIM HORMONE: CPT | Performed by: INTERNAL MEDICINE

## 2024-05-25 PROCEDURE — 25000003 PHARM REV CODE 250: Performed by: INTERNAL MEDICINE

## 2024-05-25 PROCEDURE — 36415 COLL VENOUS BLD VENIPUNCTURE: CPT | Performed by: INTERNAL MEDICINE

## 2024-05-25 PROCEDURE — 20000000 HC ICU ROOM

## 2024-05-25 PROCEDURE — 63600175 PHARM REV CODE 636 W HCPCS: Performed by: INTERNAL MEDICINE

## 2024-05-25 PROCEDURE — 94761 N-INVAS EAR/PLS OXIMETRY MLT: CPT

## 2024-05-25 PROCEDURE — 83036 HEMOGLOBIN GLYCOSYLATED A1C: CPT | Performed by: INTERNAL MEDICINE

## 2024-05-25 PROCEDURE — 80061 LIPID PANEL: CPT | Performed by: INTERNAL MEDICINE

## 2024-05-25 RX ORDER — PROMETHAZINE HYDROCHLORIDE 25 MG/ML
12.5 INJECTION, SOLUTION INTRAMUSCULAR; INTRAVENOUS EVERY 4 HOURS PRN
Status: DISCONTINUED | OUTPATIENT
Start: 2024-05-25 | End: 2024-05-25

## 2024-05-25 RX ADMIN — INSULIN ASPART 2 UNITS: 100 INJECTION, SOLUTION INTRAVENOUS; SUBCUTANEOUS at 07:05

## 2024-05-25 RX ADMIN — ATORVASTATIN CALCIUM 80 MG: 40 TABLET, FILM COATED ORAL at 08:05

## 2024-05-25 RX ADMIN — INSULIN ASPART 4 UNITS: 100 INJECTION, SOLUTION INTRAVENOUS; SUBCUTANEOUS at 04:05

## 2024-05-25 RX ADMIN — ASPIRIN 81 MG CHEWABLE TABLET 81 MG: 81 TABLET CHEWABLE at 08:05

## 2024-05-25 RX ADMIN — PROMETHAZINE HYDROCHLORIDE 12.5 MG: 25 INJECTION INTRAMUSCULAR; INTRAVENOUS at 02:05

## 2024-05-25 RX ADMIN — TICAGRELOR 90 MG: 90 TABLET ORAL at 08:05

## 2024-05-25 RX ADMIN — INSULIN ASPART 2 UNITS: 100 INJECTION, SOLUTION INTRAVENOUS; SUBCUTANEOUS at 08:05

## 2024-05-25 RX ADMIN — INSULIN ASPART 2 UNITS: 100 INJECTION, SOLUTION INTRAVENOUS; SUBCUTANEOUS at 11:05

## 2024-05-25 RX ADMIN — METOPROLOL SUCCINATE 25 MG: 25 TABLET, EXTENDED RELEASE ORAL at 08:05

## 2024-05-25 RX ADMIN — LOSARTAN POTASSIUM 25 MG: 25 TABLET, FILM COATED ORAL at 08:05

## 2024-05-25 NOTE — NURSING
[facility]  Nurses Note -- 4 Eyes      5/24/2024       Skin assessed on: Admit      [x] No Pressure Injuries Present    [x]Prevention Measures Documented    [] Yes LDA  for Pressure Injury Previously documented     [] Yes New Pressure Injury Discovered   [] LDA for New Pressure Injury Added      Attending RN:  Miranda Aasen, RN     Second RN:  MIRACLE Dsouza

## 2024-05-25 NOTE — HPI
Patient is a 48-year-old lady with past medical history of coronary artery disease status post PCI of LAD for ST-elevation MI in May of 2023.  She underwent PCI of LAD.  After that followed  with  [facility].   Came in with chest pain for the past few hours. Initial EKG showed sinus rhythm with anteroseptal infarction, subsequent EKG showed ST elevation in V2 as well as ST depressions in 2 3 AVF with some ST elevation seen in the lateral leads also.  Because of ongoing chest pain despite 2 nitroglycerin, cath lab was activated by ER    Patient states that her chest pain started today at 3:00 p.m..  At 5:00 p.m. had some Check filet.  Chest pain did not go away.  Came to the ER with on and off chest pain.  Cath lab was activated by the ER based on the 2nd EKG demonstrating greater ST elevations in lead V2 and also because the fact that she kept on having chest pains.  Was taken emergently to the cardiac catheterization laboratory after informed consent obtained.  It revealed stent thrombosis of the previously placed LAD stent.  PTCA was performed with IVUS guidance with excellent angiographic results.  No new stents were placed.  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  Should stenting be indicated, the patient has agreed to dual anti-platelet therapy for 1-consecutive year with a drug-eluting stent and a minimum of 1-month with the use of a bare metal stent. Additionally, pt is aware that non-compliance is likely to result in stent clotting with heart attack, heart failure, and/or death  The risks of moderate sedation include hypotension,  respiratory depression, arrhythmias, bronchospasm, and death. Informed consent was obtained and the  patient is agreeable to proceed with the procedure. Consent was placed on the chart.      Results for orders placed or performed during the hospital encounter of 05/14/24   EKG 12-lead    Collection Time: 05/14/24  7:28 PM   Result Value Ref Range    QRS Duration 88 ms    [facility] QTC Calculation 449 ms    Narrative    Test Reason : R07.9,    Vent. Rate : 084 BPM     Atrial Rate : 084 BPM     P-R Int : 136 ms          QRS Dur : 088 ms      QT Int : 380 ms       P-R-T Axes : 022 -08 027 degrees     QTc Int : 449 ms    Normal sinus rhythm  Low voltage QRS  Septal infarct (cited on or before 23-MAY-2023)  Abnormal ECG  When compared with ECG of 23-MAY-2023 02:59,  Significant changes have occurred  Confirmed by Alex Cobian MD ([id number]) on 5/15/2024 2:36:46 PM    Referred By: [email or web address]   SELF           Confirmed By:Alex Cobian MD       Results for orders placed during the hospital encounter of 05/23/23    Echo    Interpretation Summary  · The left ventricle is normal in size with mildly decreased systolic function.  · The estimated ejection fraction is 40%.  · Mid-distal anteroapical akinesis.  · Grade I left ventricular diastolic dysfunction.  · Normal right ventricular size with normal right ventricular systolic function.  · The estimated PA systolic pressure is 37 mmHg.      No results found for this or any previous visit.      Results for orders placed during the hospital encounter of 05/23/23    Cardiac catheterization    Conclusion  Description of the findings of the procedure: uneventful C/cor angio/PCI (IVUS guided) prox LAD 3.5x24 Synergy VIGNESH/R rad vasband    Findings/Key Components:  LVEDP: 4mmHg  LVEF: echo ordered    Dominance: Right  LM: normal  LAD: prox 99% thrombotic stenosis, T2 flow.  Post-PCI mid 30-40% stenosis noted.  Ramus: normal  LCx: normal  RCA: normal    PCI prox LAD:  Preop  "ASA/Plavix/heparin  Predil 2.5x12  IVUS for stent sizing  Stent 3.5x24 Synergy VIGNESH 16 munir  Post-IVUS with good stent expansion and apposition, no dissection  Excellent angiographic result, T3 flow, 0% residual stenosis    Hemostasis:  R Radial band            HPI: Eusebia Del Valle is a 48 y.o. female, with a PMHx of DM 2, HLD, obesity, CVA , who presents to the ED via EMS with acute chest tightness that began around 3:30 PM today after work. Patient reports chest pain and tightness, minor relief s/p vomiting and total relief with aspirin and nitroglycerin administered by EMS, associated dyspnea that felt like she was "choking" and "couldn't catch her breath" when lying down, temporary relief with "counting." Reports that she feels fine now, but that she took her medications later than usual today. Also reports chronic, intermittent, bilateral LE and neck "numbness." No other exacerbating or alleviating factors. Denies bilateral LE edema or other associated symptoms. Reports a bcg reading of 270, notes baseline in the 500s, noncompliance with her medications from "working too much." Patient reports a PSHx of stent placement.      ER course  per ER physician:    1932 My independent interpretation of the EKG is sinus rhythm at a rate of 97.  QT corrected is normal.  There is low voltage throughout.  There are Q-waves in the anterior septal leads.  There has no ST segment elevation or depression concerning for acute occlusive infarct at this time.  When compared to EKGs performed prior to this visit there is flattening of the T-wave in aVL but otherwise no acute change. []   1933 Chart review reveals that the patient's previous echo showed a EF of 40% and that she did have a stent placed due to a 99% lesion in her LAD []   1933 Patient is pain-free at this time []       ED Course User Index  [] Juan Miguel Ramirez MD         "

## 2024-05-25 NOTE — ED NOTES
Pt became diaphoretic and sleepy, blood pressure taken and noted to be 83/50, MD notified and further orders will be placed

## 2024-05-25 NOTE — ED NOTES
While shaving Pt's groin, Pt began stating that she felt hot. Pt notably diaphoretic. Began bradying down to 60-63, reassessed BP and was hypotensive. Primary RN made aware and alerted MD.

## 2024-05-25 NOTE — SUBJECTIVE & OBJECTIVE
Past Medical History:   Diagnosis Date    Diabetes mellitus        Past Surgical History:   Procedure Laterality Date    ANGIOGRAM, CORONARY, WITH LEFT HEART CATHETERIZATION N/A 5/23/2023    Procedure: Angiogram, Coronary, with Left Heart Cath;  Surgeon: Jass Gibbs MD;  Location: [facility] CATH LAB;  Service: Cardiology;  Laterality: N/A;    IVUS, CORONARY  5/23/2023    Procedure: IVUS, Coronary;  Surgeon: Jass Gibbs MD;  Location: [facility] CATH LAB;  Service: Cardiology;;    PTCA, SINGLE VESSEL  5/23/2023    Procedure: PTCA, Single Vessel;  Surgeon: Jass Gibbs MD;  Location: [facility] CATH LAB;  Service: Cardiology;;    STENT, DRUG ELUTING, SINGLE VESSEL, CORONARY  5/23/2023    Procedure: Stent, Drug Eluting, Single Vessel, Coronary;  Surgeon: Jass Gibbs MD;  Location: [facility] CATH LAB;  Service: Cardiology;;       Review of patient's allergies indicates:  No Known Allergies    No current facility-administered medications on file prior to encounter.     Current Outpatient Medications on File Prior to Encounter   Medication Sig    albuterol 90 mcg/actuation inhaler Inhale 1-2 puffs into the lungs every 6 (six) hours as needed for Wheezing. Rescue    aspirin (ECOTRIN) 81 MG EC tablet Take 1 tablet (81 mg total) by mouth once daily.    atorvastatin (LIPITOR) 80 MG tablet Take 1 tablet (80 mg total) by mouth every evening.    blood sugar diagnostic Strp USE TO TEST BLOOD GLUCOSE THREE TIMES DAILY    blood-glucose meter Misc USE AS INSTRUCTED    clopidogreL (PLAVIX) 75 mg tablet Take 1 tablet (75 mg total) by mouth once daily.    fluticasone (FLONASE) 50 mcg/actuation nasal spray 1 spray (50 mcg total) by Each Nare route 2 (two) times daily as needed.    insulin (LANTUS SOLOSTAR U-100 INSULIN) glargine 100 units/mL SubQ pen Inject 20 Units into the skin every evening.    insulin aspart U-100 (NOVOLOG FLEXPEN U-100 INSULIN) 100 unit/mL (3 mL) InPn pen Inject 7 Units into the skin 3 (three) times daily  "with meals.    lancets 33 gauge Misc USE TO TEST BLOOD GLUCOSE THREE TIMES DAILY    loratadine (CLARITIN) 10 mg tablet Take 1 tablet (10 mg total) by mouth once daily.    losartan (COZAAR) 25 MG tablet Take 1 tablet (25 mg total) by mouth once daily.    meloxicam (MOBIC) 7.5 MG tablet Take 1 tablet (7.5 mg total) by mouth once daily.    metoprolol succinate (TOPROL-XL) 25 MG 24 hr tablet Take 1 tablet (25 mg total) by mouth once daily.    nitroGLYCERIN (NITROSTAT) 0.4 MG SL tablet Place 1 tablet (0.4 mg total) under the tongue every 5 (five) minutes as needed for Chest pain.    pen needle, diabetic 32 gauge x 5/32" Ndle USE TO INJECT INSULIN FOUR TIMES DAILY AS DIRECTED     Family History    None       Tobacco Use    Smoking status: Never    Smokeless tobacco: Never   Substance and Sexual Activity    Alcohol use: No    Drug use: No    Sexual activity: Not Currently     Birth control/protection: None     Review of Systems   Constitutional: Negative.   HENT: Negative.     Eyes: Negative.    Cardiovascular:  Positive for chest pain.   Respiratory: Negative.     Endocrine: Negative.    Hematologic/Lymphatic: Negative.    Skin: Negative.    Musculoskeletal: Negative.    Gastrointestinal: Negative.    Genitourinary: Negative.    Neurological: Negative.    Psychiatric/Behavioral: Negative.     Allergic/Immunologic: Negative.      Objective:     Vital Signs (Most Recent):  Temp: 99.1 °F (37.3 °C) (05/24/24 1942)  Pulse: 88 (05/24/24 2032)  Resp: 20 (05/24/24 2032)  BP: (!) 144/91 (05/24/24 2032)  SpO2: (!) 94 % (05/24/24 2024) Vital Signs (24h Range):  Temp:  [99.1 °F (37.3 °C)] 99.1 °F (37.3 °C)  Pulse:  [] 88  Resp:  [18-28] 20  SpO2:  [94 %-99 %] 94 %  BP: (128-155)/() 144/91     Weight: 104.3 kg (230 lb)  Body mass index is 37.12 kg/m².    SpO2: (!) 94 %       No intake or output data in the 24 hours ending 05/24/24 2047    Lines/Drains/Airways       Peripheral Intravenous Line  Duration                  " Peripheral IV - Single Lumen 05/24/24 2009 20 G Left Antecubital <1 day                     Physical Exam  Constitutional:       Appearance: Normal appearance. She is well-developed.   HENT:      Head: Normocephalic.   Eyes:      Pupils: Pupils are equal, round, and reactive to light.   Cardiovascular:      Rate and Rhythm: Normal rate and regular rhythm.   Pulmonary:      Effort: Pulmonary effort is normal.      Breath sounds: Normal breath sounds.   Abdominal:      General: Bowel sounds are normal.      Palpations: Abdomen is soft.      Tenderness: There is no abdominal tenderness.   Musculoskeletal:         General: Normal range of motion.      Cervical back: Normal range of motion and neck supple.   Skin:     General: Skin is warm.   Neurological:      Mental Status: She is alert and oriented to person, place, and time.          Significant Labs:      DATA:     Laboratory:  CBC:  Recent Labs   Lab 05/24/23  0353 05/14/24  2026 05/24/24  1947   WBC 11.57 11.46 12.69   Hemoglobin 15.1 14.5 15.6   Hematocrit 45.4 42.0 44.0   Platelets 345 307 300       CHEMISTRIES:  Recent Labs   Lab 05/24/23  0353 05/14/24  2026 05/24/24  1947   Glucose 250 H 301 H 322 H   Sodium 137 137 137   Potassium 3.8 4.5 4.3   BUN 12 12 9   Creatinine 0.7 1.0 0.9   Calcium 9.1 10.1 9.6       CARDIAC BIOMARKERS:  Recent Labs   Lab 05/23/23  0112 05/14/24 2026 05/14/24 2246   Troponin I 0.075 H 0.025 <0.006       COAGS:  Recent Labs   Lab 05/23/23  0112   INR 1.0       LIPIDS/LFTS:  Recent Labs   Lab 05/23/23  0112 05/14/24 2026 05/24/24 1947   Cholesterol 215 H  --   --    Triglycerides 160 H  --   --    HDL 35 L  --   --    LDL Cholesterol 148.0  --   --    Non-HDL Cholesterol 180  --   --    AST 33 23 36   ALT 39 29 37       Hemoglobin A1C   Date Value Ref Range Status   05/23/2023 9.4 (H) 4.0 - 5.6 % Final     Comment:     ADA Screening Guidelines:  5.7-6.4%  Consistent with prediabetes  >or=6.5%  Consistent with diabetes    High  levels of fetal hemoglobin interfere with the HbA1C  assay. Heterozygous hemoglobin variants (HbS, HgC, etc)do  not significantly interfere with this assay.   However, presence of multiple variants may affect accuracy.         TSH        The 10-year ASCVD risk score (Claire WHEATLEY, et al., 2019) is: 11.3%    Values used to calculate the score:      Age: 48 years      Sex: Female      Is Non- : Yes      Diabetic: Yes      Tobacco smoker: No      Systolic Blood Pressure: 144 mmHg      Is BP treated: No      HDL Cholesterol: 35 mg/dL      Total Cholesterol: 215 mg/dL       BNP    Lab Results   Component Value Date/Time    BNP 17 05/14/2024 08:26 PM    BNP 22 05/23/2023 01:12 AM            ECHO    Results for orders placed during the hospital encounter of 05/23/23    Echo    Interpretation Summary  · The left ventricle is normal in size with mildly decreased systolic function.  · The estimated ejection fraction is 40%.  · Mid-distal anteroapical akinesis.  · Grade I left ventricular diastolic dysfunction.  · Normal right ventricular size with normal right ventricular systolic function.  · The estimated PA systolic pressure is 37 mmHg.      STRESS TEST    No results found for this or any previous visit.        CATH    Results for orders placed during the hospital encounter of 05/23/23    Cardiac catheterization    Conclusion  Description of the findings of the procedure: uneventful C/cor angio/PCI (IVUS guided) prox LAD 3.5x24 Synergy VIGNESH/R rad vasband    Findings/Key Components:  LVEDP: 4mmHg  LVEF: echo ordered    Dominance: Right  LM: normal  LAD: prox 99% thrombotic stenosis, T2 flow.  Post-PCI mid 30-40% stenosis noted.  Ramus: normal  LCx: normal  RCA: normal    PCI prox LAD:  Preop ASA/Plavix/heparin  Predil 2.5x12  IVUS for stent sizing  Stent 3.5x24 Synergy VIGNESH 16 munir  Post-IVUS with good stent expansion and apposition, no dissection  Excellent angiographic result, T3 flow, 0% residual  stenosis    Hemostasis:  R Radial band

## 2024-05-25 NOTE — PROGRESS NOTES
[facility] Medicine  Progress Note    Patient Name: Eusebia Del Valle  MRN: [id number]  Patient Class: IP- Inpatient   Admission Date: 5/24/2024  Length of Stay: 1 days  Attending Physician: Madeline Prescott, *  Primary Care Provider: Unable, To Obtain        Subjective:     Principal Problem:STEMI (ST elevation myocardial infarction)        HPI:  48-year-old  female with a past medical history significant for coronary artery disease status post stent placed 1 year ago in her LAD, hypertension, hyperlipidemia, obesity, non-insulin-dependent type 2 diabetes (on Ozempic) presents to the ER with chest pressure starting around 3:00 a.m. this afternoon while she was in orientation for her new job.  Patient denies any previous history of chest pain in the past couple of months.  Denies any lower extremity edema.  Denies any dyspnea on exertion no shortness a breath.  Denies any headaches nausea or vomiting.    In the ER was found to have a STEMI with ST-elevation in leads V1/V2 and Depression in III.  Cardiology was contacted and plans for emergent cardiac catheterization this evening.  We have been consulted for further management after cardiac catheterization in the ICU.  Patient states she does not take Plavix but still takes aspirin.  Denies missing her meds.    Overview/Hospital Course:  48-year-old  female with a past medical history significant for coronary artery disease status post stent placed 1 year ago in her LAD, hypertension, hyperlipidemia, obesity, non-insulin-dependent type 2 diabetes (on Ozempic) presents to the ER with chest pressure starting around 3:00 a.m. this afternoon while she was in orientation for her new job.  Patient denies any previous history of chest pain in the past couple of months.  Denies any lower extremity edema.  Denies any dyspnea on exertion no shortness a breath.  Denies any headaches nausea or vomiting.  In the ER was  found to have a STEMI with ST-elevation in leads V1/V2 and Depression in III.  Cardiology was contacted and plans for emergent cardiac catheterization this evening.  We have been consulted for further management after cardiac catheterization in the ICU.  Patient states she does not take Plavix but still takes aspirin.  Denies missing her meds.  S/P [facility] ,    Patient came in with anterior STEMI caused by InStent thrombosis of previously placed LAD stent.  Balloon angioplasty of the stent was performed with excellent angiographic results.  IVUS post PCI revealed well-expanded and apposed stent without any proximal or distal dissections.  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  No new stents were placed and we were able to restore GOLDIE 3 flow with balloon angioplasty    There was single vessel coronary artery disease.    The Prox LAD lesion was 100% stenosed with 0% stenosis post-intervention.  Cardiology started on Brillanta,send to pharmacy,SW arranging for coupon.    Interval History:   S/P [facility] ,    Patient came in with anterior STEMI caused by InStent thrombosis of previously placed LAD stent.  Balloon angioplasty of the stent was performed with excellent angiographic results.  IVUS post PCI revealed well-expanded and apposed stent without any proximal or distal dissections.  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  No new stents were placed and we were able to restore GOLDIE 3 flow with balloon angioplasty    There was single vessel coronary artery disease.    The Prox LAD lesion was 100% stenosed with 0% stenosis post-intervention.  Cardiology started on Brillanta,send to pharmacy,SW arranging for coupon.  Review of Systems   Constitutional:  Positive for activity change and appetite change.   Respiratory:  Negative for apnea and choking.    Gastrointestinal:  Negative for abdominal distention and abdominal pain.   Genitourinary:  Negative for difficulty urinating and dyspareunia.   Neurological:   Positive for weakness.     Objective:     Vital Signs (Most Recent):  Temp: 98.8 °F (37.1 °C) (05/25/24 0701)  Pulse: 82 (05/25/24 1000)  Resp: (!) 22 (05/25/24 1000)  BP: 102/78 (05/25/24 1000)  SpO2: 100 % (05/25/24 1000) Vital Signs (24h Range):  Temp:  [98.1 °F (36.7 °C)-99.1 °F (37.3 °C)] 98.8 °F (37.1 °C)  Pulse:  [] 82  Resp:  [17-73] 22  SpO2:  [90 %-100 %] 100 %  BP: ()/() 102/78     Weight: 93.4 kg (205 lb 14.6 oz)  Body mass index is 33.23 kg/m².    Intake/Output Summary (Last 24 hours) at 5/25/2024 1117  Last data filed at 5/25/2024 0800  Gross per 24 hour   Intake 120 ml   Output --   Net 120 ml         Physical Exam  Cardiovascular:      Rate and Rhythm: Normal rate.   Pulmonary:      Effort: No respiratory distress.      Breath sounds: No wheezing.   Abdominal:      General: There is no distension.   Skin:     Coloration: Skin is not jaundiced.      Findings: No bruising.   Neurological:      Mental Status: She is alert and oriented to person, place, and time.      Cranial Nerves: No cranial nerve deficit.      Motor: No weakness.             Significant Labs: All pertinent labs within the past 24 hours have been reviewed.  BMP:   Recent Labs   Lab 05/24/24 1947 05/25/24 0434   * 306*    138   K 4.3 4.1    107   CO2 21* 22*   BUN 9 9   CREATININE 0.9 0.8   CALCIUM 9.6 9.2   MG 2.1  --      CBC:   Recent Labs   Lab 05/24/24 1947 05/25/24 0434   WBC 12.69 13.52*   HGB 15.6 14.3   HCT 44.0 42.5    299     CMP:   Recent Labs   Lab 05/24/24 1947 05/25/24  0434    138   K 4.3 4.1    107   CO2 21* 22*   * 306*   BUN 9 9   CREATININE 0.9 0.8   CALCIUM 9.6 9.2   PROT 7.8  --    ALBUMIN 3.9  --    BILITOT 0.6  --    ALKPHOS 130  --    AST 36  --    ALT 37  --    ANIONGAP 12 9     Troponin:   Recent Labs   Lab 05/24/24 1947 05/24/24 2244 05/25/24  0434   TROPONINI 0.205* 1.472* 7.776*       Significant Imaging: I have reviewed all pertinent  imaging results/findings within the past 24 hours.    Assessment/Plan:      * STEMI (ST elevation myocardial infarction)  Plan for emergent cardiac catheterization this evening with Dr. Carroll.  We will follow up afterwards.  Patient got loaded with 325 aspirin, heparin and started on heparin drip, was also started on Ticagrelor 180mg x1.  We will follow up after catheterization.  Further orders per recommendation of Cardiology.  S/P [facility] ,    Patient came in with anterior STEMI caused by InStent thrombosis of previously placed LAD stent.  Balloon angioplasty of the stent was performed with excellent angiographic results.  IVUS post PCI revealed well-expanded and apposed stent without any proximal or distal dissections.  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  No new stents were placed and we were able to restore GOLDIE 3 flow with balloon angioplasty    There was single vessel coronary artery disease.    The Prox LAD lesion was 100% stenosed with 0% stenosis post-intervention.  Cardiology started on Brillanta,send to pharmacy,SW arranging for coupon.    Essential hypertension  Resume home medications.  We will follow up with Cardiology for any changes or additions that needed.  Hold parameters placed for low normal heart rate and blood pressure parameters.    Class 1 obesity in adult  Follow up on TSH.  Will consult dietary.    Hyperlipidemia  Resume statin at this time.  Follow up on fasting lipid profile.  Any further changes pending results.    Type 2 diabetes mellitus with circulatory disorder, without long-term current use of insulin    Sliding scale insulin ordered and A1c ordered.  Further adjustments pending results.      VTE Risk Mitigation (From admission, onward)           Ordered     heparin infusion 1,000 units/500 ml in 0.9% NaCl (pressure line flush)  Intra-op continuous PRN         05/24/24 5240     Reason for No Pharmacological VTE Prophylaxis  Once        Question:  Reasons:  Answer:  Physician  Provided (leave comment)  Comment:  heparin drip    05/24/24 2126     IP VTE HIGH RISK PATIENT  Once         05/24/24 2126     Place sequential compression device  Until discontinued         05/24/24 2126                    Discharge Planning   MIGUELITO:      Code Status: Full Code   Is the patient medically ready for discharge?:     Reason for patient still in hospital (select all that apply): Patient trending condition               Critical care time spent on the evaluation and treatment of severe organ dysfunction, review of pertinent labs and imaging studies, discussions with consulting providers and discussions with patient/family:  over 45  minutes.      Madeline Prescott MD  Department of Hospital Medicine   [facility] [place] - Intensive Care

## 2024-05-25 NOTE — H&P
[facility] Medicine  History & Physical    Patient Name: Eusebia Del Valle  MRN: [id number]  Patient Class: IP- Inpatient  Admission Date: 5/24/2024  Attending Physician: Dr. Genevieve Hui   Primary Care Provider: Unable, To Obtain         Patient information was obtained from patient and ER records.     Subjective:     Principal Problem:STEMI (ST elevation myocardial infarction)    Chief Complaint:   Chief Complaint   Patient presents with    Chest Pain     Patient reports sharp mid sternal CP that started at 3PM, with vomiting H/O MI, given 1 spray of Nirto and 324 Asprin         HPI: 48-year-old  female with a past medical history significant for coronary artery disease status post stent placed 1 year ago in her LAD, hypertension, hyperlipidemia, obesity, non-insulin-dependent type 2 diabetes (on Ozempic) presents to the ER with chest pressure starting around 3:00 a.m. this afternoon while she was in orientation for her new job.  Patient denies any previous history of chest pain in the past couple of months.  Denies any lower extremity edema.  Denies any dyspnea on exertion no shortness a breath.  Denies any headaches nausea or vomiting.    In the ER was found to have a STEMI with ST-elevation in leads V1/V2 and Depression in III.  Cardiology was contacted and plans for emergent cardiac catheterization this evening.  We have been consulted for further management after cardiac catheterization in the ICU.  Patient states she does not take Plavix but still takes aspirin.  Denies missing her meds.    Past Medical History:   Diagnosis Date    Diabetes mellitus        Past Surgical History:   Procedure Laterality Date    ANGIOGRAM, CORONARY, WITH LEFT HEART CATHETERIZATION N/A 5/23/2023    Procedure: Angiogram, Coronary, with Left Heart Cath;  Surgeon: Jass Gibbs MD;  Location: [facility] CATH LAB;  Service: Cardiology;  Laterality: N/A;    IVUS, CORONARY  5/23/2023     Procedure: IVUS, Coronary;  Surgeon: Jass Gibbs MD;  Location: [facility] CATH LAB;  Service: Cardiology;;    PTCA, SINGLE VESSEL  5/23/2023    Procedure: PTCA, Single Vessel;  Surgeon: Jass Gibbs MD;  Location: [facility] CATH LAB;  Service: Cardiology;;    STENT, DRUG ELUTING, SINGLE VESSEL, CORONARY  5/23/2023    Procedure: Stent, Drug Eluting, Single Vessel, Coronary;  Surgeon: Jass Gibbs MD;  Location: [facility] CATH LAB;  Service: Cardiology;;       Review of patient's allergies indicates:  No Known Allergies    No current facility-administered medications on file prior to encounter.     Current Outpatient Medications on File Prior to Encounter   Medication Sig    aspirin (ECOTRIN) 81 MG EC tablet Take 1 tablet (81 mg total) by mouth once daily.    atorvastatin (LIPITOR) 80 MG tablet Take 1 tablet (80 mg total) by mouth every evening.    clopidogreL (PLAVIX) 75 mg tablet Take 1 tablet (75 mg total) by mouth once daily.    losartan (COZAAR) 25 MG tablet Take 1 tablet (25 mg total) by mouth once daily.    metoprolol succinate (TOPROL-XL) 25 MG 24 hr tablet Take 1 tablet (25 mg total) by mouth once daily.    nitroGLYCERIN (NITROSTAT) 0.4 MG SL tablet Place 1 tablet (0.4 mg total) under the tongue every 5 (five) minutes as needed for Chest pain.    OZEMPIC 0.25 mg or 0.5 mg (2 mg/3 mL) pen injector Inject 0.5 mg into the skin every 7 days.    [DISCONTINUED] albuterol 90 mcg/actuation inhaler Inhale 1-2 puffs into the lungs every 6 (six) hours as needed for Wheezing. Rescue    [DISCONTINUED] blood sugar diagnostic Strp USE TO TEST BLOOD GLUCOSE THREE TIMES DAILY    [DISCONTINUED] blood-glucose meter Misc USE AS INSTRUCTED    [DISCONTINUED] fluticasone (FLONASE) 50 mcg/actuation nasal spray 1 spray (50 mcg total) by Each Nare route 2 (two) times daily as needed.    [DISCONTINUED] insulin (LANTUS SOLOSTAR U-100 INSULIN) glargine 100 units/mL SubQ pen Inject 20 Units into the skin every evening.     "[DISCONTINUED] insulin aspart U-100 (NOVOLOG FLEXPEN U-100 INSULIN) 100 unit/mL (3 mL) InPn pen Inject 7 Units into the skin 3 (three) times daily with meals.    [DISCONTINUED] lancets 33 gauge Misc USE TO TEST BLOOD GLUCOSE THREE TIMES DAILY    [DISCONTINUED] loratadine (CLARITIN) 10 mg tablet Take 1 tablet (10 mg total) by mouth once daily.    [DISCONTINUED] meloxicam (MOBIC) 7.5 MG tablet Take 1 tablet (7.5 mg total) by mouth once daily.    [DISCONTINUED] pen needle, diabetic 32 gauge x 5/32" Ndle USE TO INJECT INSULIN FOUR TIMES DAILY AS DIRECTED     Family History    None       Tobacco Use    Smoking status: Never    Smokeless tobacco: Never   Substance and Sexual Activity    Alcohol use: No    Drug use: No    Sexual activity: Not Currently     Birth control/protection: None     Review of Systems   Constitutional:  Negative for activity change, appetite change, chills, diaphoresis, fatigue and fever.   HENT:  Negative for sinus pressure, sinus pain and sore throat.    Eyes:  Negative for visual disturbance.   Respiratory:  Negative for cough, chest tightness, shortness of breath and wheezing.    Cardiovascular:  Positive for chest pain. Negative for palpitations and leg swelling.   Gastrointestinal:  Negative for abdominal pain, nausea and vomiting.   Genitourinary:  Negative for dysuria.   Musculoskeletal:  Negative for arthralgias and myalgias.   Neurological:  Negative for light-headedness and headaches.   Psychiatric/Behavioral:  The patient is not nervous/anxious.      Objective:     Vital Signs (Most Recent):  Temp: 99.1 °F (37.3 °C) (05/24/24 1942)  Pulse: 71 (05/24/24 2106)  Resp: (!) 30 (05/24/24 2106)  BP: 98/67 (05/24/24 2106)  SpO2: 97 % (05/24/24 2109) Vital Signs (24h Range):  Temp:  [99.1 °F (37.3 °C)] 99.1 °F (37.3 °C)  Pulse:  [] 71  Resp:  [18-30] 30  SpO2:  [90 %-99 %] 97 %  BP: ()/() 98/67     Weight: 104.3 kg (230 lb)  Body mass index is 37.12 kg/m².     Physical " Exam  Vitals and nursing note reviewed.   Constitutional:       General: She is not in acute distress.     Appearance: Normal appearance. She is obese. She is diaphoretic. She is not ill-appearing or toxic-appearing.   HENT:      Head: Normocephalic and atraumatic.      Nose: Nose normal. No congestion or rhinorrhea.      Mouth/Throat:      Mouth: Mucous membranes are moist.      Pharynx: Oropharynx is clear. No oropharyngeal exudate or posterior oropharyngeal erythema.   Eyes:      General: No scleral icterus.     Extraocular Movements: Extraocular movements intact.      Conjunctiva/sclera: Conjunctivae normal.      Pupils: Pupils are equal, round, and reactive to light.   Neck:      Vascular: No carotid bruit.   Cardiovascular:      Rate and Rhythm: Normal rate and regular rhythm.      Pulses: Normal pulses.      Heart sounds: No murmur heard.     No friction rub. No gallop.   Pulmonary:      Effort: Pulmonary effort is normal.      Breath sounds: Normal breath sounds. No wheezing, rhonchi or rales.   Abdominal:      General: Bowel sounds are normal. There is no distension.      Palpations: Abdomen is soft.      Tenderness: There is no abdominal tenderness. There is no guarding or rebound.   Musculoskeletal:         General: No swelling. Normal range of motion.      Cervical back: Normal range of motion and neck supple.      Right lower leg: No edema.      Left lower leg: Edema present.   Skin:     General: Skin is warm.      Capillary Refill: Capillary refill takes less than 2 seconds.      Coloration: Skin is pale.   Neurological:      General: No focal deficit present.      Mental Status: She is alert and oriented to person, place, and time.      Cranial Nerves: No cranial nerve deficit.      Motor: No weakness.      Coordination: Coordination normal.   Psychiatric:         Mood and Affect: Mood normal.         Behavior: Behavior normal.              CRANIAL NERVES     CN III, IV, VI   Pupils are equal, round,  and reactive to light.       Significant Labs: All pertinent labs within the past 24 hours have been reviewed.  Recent Lab Results         05/24/24  1947        Albumin 3.9              ALT 37       Anion Gap 12       AST 36       Baso # 0.04       Basophil % 0.3       BILIRUBIN TOTAL 0.6  Comment: For infants and newborns, interpretation of results should be based  on gestational age, weight and in agreement with clinical  observations.    Premature Infant recommended reference ranges:  Up to 24 hours.............<8.0 mg/dL  Up to 48 hours............<12.0 mg/dL  3-5 days..................<15.0 mg/dL  6-29 days.................<15.0 mg/dL         BUN 9       Calcium 9.6       Chloride 104       CO2 21       Creatinine 0.9       Differential Method Automated       eGFR >60       Eos # 0.1       Eos % 1.0       Glucose 322       Gran # (ANC) 9.3       Gran % 73.4       Hematocrit 44.0       Hemoglobin 15.6       Immature Grans (Abs) 0.03  Comment: Mild elevation in immature granulocytes is non specific and   can be seen in a variety of conditions including stress response,   acute inflammation, trauma and pregnancy. Correlation with other   laboratory and clinical findings is essential.         Immature Granulocytes 0.2       Lymph # 2.8       Lymph % 21.7       MCH 29.9       MCHC 35.5       MCV 84       Mono # 0.4       Mono % 3.4       MPV 11.6       nRBC 0       Platelet Count 300       Potassium 4.3  Comment: Specimen moderately hemolyzed       PROTEIN TOTAL 7.8       RBC 5.22       RDW 11.2       Sodium 137       Troponin I 0.205  Comment: The reference interval for Troponin I represents the 99th percentile   cutoff   for our facility and is consistent with 3rd generation assay   performance.         WBC 12.69               Significant Imaging: I have reviewed all pertinent imaging results/findings within the past 24 hours.  Assessment/Plan:     * STEMI (ST elevation myocardial infarction)  Plan for  emergent cardiac catheterization this evening with Dr. Carroll.  We will follow up afterwards.  Patient got loaded with 325 aspirin, heparin and started on heparin drip, was also started on Ticagrelor 180mg x1.  We will follow up after catheterization.  Further orders per recommendation of Cardiology.    Type 2 diabetes mellitus with circulatory disorder, without long-term current use of insulin    Sliding scale insulin ordered and A1c ordered.  Further adjustments pending results.    Essential hypertension  Resume home medications.  We will follow up with Cardiology for any changes or additions that needed.  Hold parameters placed for low normal heart rate and blood pressure parameters.    Hyperlipidemia  Resume statin at this time.  Follow up on fasting lipid profile.  Any further changes pending results.    Class 1 obesity in adult  Follow up on TSH.  Will consult dietary.      VTE Risk Mitigation (From admission, onward)           Ordered     heparin 25,000 units in dextrose 5% (100 units/ml) IV bolus from bag LOW INTENSITY nomogram - [facility]  As needed (PRN)        Question:  Heparin Infusion Adjustment (DO NOT MODIFY ANSWER)  Answer:  \\[email or web address]\epic\Images\Pharmacy\HeparinInfusions\heparin LOW INTENSITY nomogram for [facility] [id number].pdf    05/24/24 2129     heparin 25,000 units in dextrose 5% (100 units/ml) IV bolus from bag LOW INTENSITY nomogram - [facility]  As needed (PRN)        Question:  Heparin Infusion Adjustment (DO NOT MODIFY ANSWER)  Answer:  \\[email or web address]\epic\Images\Pharmacy\HeparinInfusions\heparin LOW INTENSITY nomogram for [facility] [id number].pdf    05/24/24 2129     heparin 25,000 units in dextrose 5% 250 mL (100 units/mL) infusion LOW INTENSITY nomogram - [facility]  Continuous        Question:  Begin at (units/kg/hr)  Answer:  12 05/24/24 2129     Reason for No Pharmacological VTE Prophylaxis  Once        Question:  Reasons:  Answer:  Physician Provided (leave comment)  Comment:  heparin drip    05/24/24 2126     IP  VTE HIGH RISK PATIENT  Once         05/24/24 2126     Place sequential compression device  Until discontinued         05/24/24 2126                  Critical care time spent on the evaluation and treatment of severe organ dysfunction, review of pertinent labs and imaging studies, discussions with consulting providers and discussions with patient/family: 65 minutes.         CODE STATUS: FULL CODE    Genevieve Hui MD  Department of Hospital Medicine  [facility]      AdmissionCare    Guideline: Cardiology, Inpatient    Based on the indications selected for the patient, the bed status of Inpatient was determined to be MET    The following indications were selected as present at the time of evaluation of the patient:      - Cardiology condition, symptom, or finding for which observation care has failed or is not considered appropriate. See General Criteria: Observation Care, General Admission Criteria, or Pediatric General Admission Criteria guideline as appropriate.    AdmissionCare documentation entered by: Ana Hung    [facility], 27th edition, Copyright © 2023 [facility], [facility] All Rights Reserved.  [id number]:13-05:00

## 2024-05-25 NOTE — ADMISSIONCARE
AdmissionCare    Guideline: Myocardial Infarction, WITH Diabetes, Inpatient    Based on the indications selected for the patient, the bed status of Inpatient was determined to be MET    The following indications were selected as present at the time of evaluation of the patient:      - Acute myocardial infarction (MI) (not in context of cardiac procedure within last 48 hours), as indicated by ALL of the following:    - Elevated cardiac troponin level, as indicated by 1 or more of the following:     - New or presumed new elevation of cardiac troponin level (ie, elevation clearly not due to chronic condition)    - Myocardial injury due to acute ischemia, as indicated by 1 or more of the following:     - Symptoms consistent with myocardial ischemia (eg, chest pain, dyspnea)     - New or presumed new ECG changes consistent with ischemia (eg, ST-segment change, left bundle branch block)    AdmissionCare documentation entered by: Ana Hung    [facility], 27th edition, Copyright © 2023 [facility] [employer] [facility], [facility] All Rights Reserved.  [id number]:00

## 2024-05-25 NOTE — ASSESSMENT & PLAN NOTE
Anterior STEMI caused by InStent thrombosis.  Patient states that she was compliant with her medications for 2  months after the PCI, but then started becoming noncompliant  and takes her medications only once or twice a week.  Initiate aspirin 81 mg daily, Brilinta 90 mg b.I.d., statins with goal LDL less than 70, check 2D echo.  Blood pressure currently soft, will initiate therapy for her ischemic cardiomyopathy as blood pressure tolerates with long-acting beta blockers, Ace/ARB.  Currently euvolemic on exam     Importance of compliance with dual antiplatelet therapy discussed in detail with the patient.  Patient understands and agrees that she will be compliant with her medications from now on

## 2024-05-25 NOTE — ED PROVIDER NOTES
"SCRIBE #1 NOTE: I, Eli Rodriguez, am scribing for, and in the presence of,  Juan Miguel Ramirez MD. I have scribed the following portions of the note - Other sections scribed: HPI, ROS, PE.           EM PHYSICIAN NOTE       This patient presents with a complaint of   Chief Complaint   Patient presents with    Chest Pain     Patient reports sharp mid sternal CP that started at 3PM, with vomiting H/O MI, given 1 spray of Nirto and 324 Asprin        Source of HPI & ROS: patient    HPI: Eusebia Del Valle is a 48 y.o. female, with a PMHx of DM 2, HLD, obesity, CVA , who presents to the ED via EMS with acute chest tightness that began around 3:30 PM today after work. Patient reports chest pain and tightness, minor relief s/p vomiting and total relief with aspirin and nitroglycerin administered by EMS, associated dyspnea that felt like she was "choking" and "couldn't catch her breath" when lying down, temporary relief with "counting." Reports that she feels fine now, but that she took her medications later than usual today. Also reports chronic, intermittent, bilateral LE and neck "numbness." No other exacerbating or alleviating factors. Denies bilateral LE edema or other associated symptoms. Reports a bcg reading of 270, notes baseline in the 500s, noncompliance with her medications from "working too much." Patient reports a PSHx of stent placement.       Review of patient's allergies indicates:  No Known Allergies    [facility]         Pertinent REVIEW of SYSTEMS    GENERAL/CONSTITUTIONAL: There is not a report of fever   CARDIOVASCULAR: See HPI.   RESPIRATORY: See HPI.   GASTROINTESTINAL: See HPI.   HEMATOLOGIC/LYMPHATIC: There is a report of anticoagulant/antithrombotic use.       The nurse's notes and triage vital signs were reviewed.    PHYSICAL EXAMINATION    ED Triage Vitals   Enc Vitals Group      BP 05/24/24 1836 (!) 155/104      Pulse 05/24/24 1836 107      Resp 05/24/24 1836 " "18      Temp --       Temp Source 05/24/24 1836 Oral      SpO2 05/24/24 1836 99 %      Weight 05/24/24 1837 230 lb      Height 05/24/24 1837 5' 6"      Head Circumference --       Peak Flow --       Pain Score --       Pain Loc --       Pain Education --       Exclude from Growth Chart --      Vital signs and Pulse Ox reviewed in clinical context. Abnormalities noted: None  Body mass index is 37.12 kg/m².  Pt's level of consciousness is Awake and Alert, and the patient is in mild distress.  Skin: warm, pink and dry.  Capillary refill is less than 2 seconds.  Mucosa: normal  Head and Neck: no JVD, neck supple  Cardiac exam: RRR I did not appreciate a murmur. 2+ posterior tibialis pulses.    Pulmonary exam: unlabored and clear  Abd Exam: soft nontender   Musculoskeletal: no joint tenderness, deformity or swelling   Neurologic: GCS 15; moving all extremities equally, no facial droop       Medical decision making:   Nurses notes and Vital Signs reviewed.     Problems: Today's visit reveals chest pain which is a/an Acute problem that is concerning for deterioration due to a differential diagnosis that includes ACS, acute MI, hyperglycemia .     Other problems today include ACS, unstable     MDM Components integrated into this visit: Social determinants of health impacting care today: Access to PCP impaired because patient was unable to obtain appointment with PCP in a timely manner    Considerations: My decision to admit to hospital this patient is based on EKGs changing in the setting of chest pain concerning for acute MI.          See ER course below for lab test ordered, results reviewed, independent interpretation of images or EKG, discussion with consultants, data obtained from sources other than patient:  ED Course as of 05/24/24 2143   Fri May 24, 2024   1932 My independent interpretation of the EKG is sinus rhythm at a rate of 97.  QT corrected is normal.  There is low voltage throughout.  There are Q-waves in " the anterior septal leads.  There has no ST segment elevation or depression concerning for acute occlusive infarct at this time.  When compared to EKGs performed prior to this visit there is flattening of the T-wave in aVL but otherwise no acute change. []   1933 Chart review reveals that the patient's previous echo showed a EF of 40% and that she did have a stent placed due to a 99% lesion in her LAD [MH]   1933 Patient is pain-free at this time []   1956 CBC is normal []   2030 Patient reports the chest pain is coming back.  I have ordered nitroglycerin and repeat EKGs []   2035 My independent interpretation of the repeat EKG in the setting of chest pain reveals ST elevation in V2 as well as depression in the inferior leads.  I have ordered nitroglycerin. []   2035 Patient has received a 2nd nitroglycerin has ongoing pain.  Based on these new EKG finding on calling a STEMI alert []   2045 DW Dr Carroll: heparin 5000 and Brilinta 180.  Activate cath lab. []   2046 CMP reveals a glucose of 322 and a bicarb of 21.  Creatinine is unchanged from prior and is normal [MH]   2047 Independent interpretation:  Chest x-ray does not reveal infiltrate or widened mediastinum [MH]   2100 Pt weak; BP 85 and diaphoretic.  IVF bolus improving BP to 98/p []   2111 Discussed with Hosp Med: Dr. Hui; she will see pt. []      ED Course User Index  [] Juan Miguel Ramirez MD          CRITICAL CARE TIME:   Critical care services included the following: chart data review, reviewing nursing notes and researching old charts from internal and external sources, documentation time, consultant collaboration regarding findings and treatment options, medication orders and management, direct patient care, vital sign assessments, physical exam reassessments, and ordering, interpreting and reviewing diagnostic studies/lab tests.    Aggregate critical care time was approximately 35 minutes, which includes only time during which I was  engaged in work directly related to the patient's care, as described above, whether at the bedside or elsewhere in the Emergency Department.  It did not include time spent performing other reported procedures or the services of residents, students, nurses or physician assistants.      Transfer of care: 9:29 PM  The medical management of Eusebia Del Valle has been transferred to the admitting team.  At this time, the patient's condition is unchanged, and this was relayed to the accepting team.  Please see notes from the admitting team for continued care.  Juan Miguel Ramirez 9:29 PM          Orders Placed This Encounter   Procedures    X-Ray Chest AP Portable    CBC auto differential    Comprehensive metabolic panel    Troponin I #2    Urinalysis, Reflex to Urine Culture Urine, Clean Catch    hCG, quantitative, pregnancy    Troponin I #1    HCG, Quantitative    Troponin I    CBC auto differential    Magnesium    TSH    Lipid panel    Hemoglobin A1c    Basic metabolic panel    CBC Auto Differential    Magnesium    Diet NPO Except for: Sips with Medication, Ice Chips, Medication    Vital signs    Cardiac Monitoring - Adult    Vital signs    Bladder scan    Notify Physician    Place sequential compression device    Recheck Blood Glucose:    Draw aPTT level 6 hours after start of infusion; adjust dosage and order aPTT based on the nomogram in hyperlink    Do not administer any intramuscular injections, call physician for an alternative route or medication if medication is needed    If bleeding occurs, or HIT is suspected, stop heparin infusion and contact physician    Full code    Inpatient consult to Cardiology    Pulse Oximetry Continuous    POCT urine pregnancy    EKG 12-lead    EKG 12-lead    EKG 12-lead    EKG 12-lead    Saline lock IV    Saline lock IV    Admit to Inpatient     Medications   nitroGLYCERIN 2% TD oint ointment 1 inch (0 inches Topical (Top) Hold 5/24/24 1945)   nitroGLYCERIN SL tablet 0.4 mg (0.4 mg  Sublingual Given 5/24/24 2033)   sodium chloride 0.9% bolus 1,000 mL 1,000 mL (1,000 mLs Intravenous New Bag 5/24/24 2059)   sodium chloride 0.9% flush 10 mL (has no administration in time range)   naloxone 0.4 mg/mL injection 0.02 mg (has no administration in time range)   glucose chewable tablet 16 g (has no administration in time range)   glucose chewable tablet 24 g (has no administration in time range)   glucagon (human recombinant) injection 1 mg (has no administration in time range)   acetaminophen tablet 650 mg (has no administration in time range)   polyethylene glycol packet 17 g (has no administration in time range)   ondansetron injection 4 mg (has no administration in time range)   insulin aspart U-100 pen 0-5 Units (has no administration in time range)   melatonin tablet 6 mg (has no administration in time range)   simethicone chewable tablet 80 mg (has no administration in time range)   aluminum-magnesium hydroxide-simethicone 200-200-20 mg/5 mL suspension 30 mL (has no administration in time range)   dextrose 10% bolus 125 mL 125 mL (has no administration in time range)   dextrose 10% bolus 250 mL 250 mL (has no administration in time range)   aspirin chewable tablet 81 mg (has no administration in time range)   heparin 25,000 units in dextrose 5% 250 mL (100 units/mL) infusion LOW INTENSITY nomogram - [facility] (has no administration in time range)   heparin 25,000 units in dextrose 5% (100 units/ml) IV bolus from bag LOW INTENSITY nomogram - [facility] (has no administration in time range)   heparin 25,000 units in dextrose 5% (100 units/ml) IV bolus from bag LOW INTENSITY nomogram - [facility] (has no administration in time range)   atorvastatin tablet 80 mg (has no administration in time range)   losartan tablet 25 mg (has no administration in time range)   metoprolol succinate (TOPROL-XL) 24 hr tablet 25 mg (has no administration in time range)   LIDOcaine (PF) 10 mg/ml (1%) injection (1 mL Subcutaneous Given  5/24/24 2139)   ticagrelor tablet 180 mg (180 mg Oral Given 5/24/24 2045)   heparin (porcine) injection 5,000 Units (5,000 Units Intravenous Given 5/24/24 2045)           Diagnoses that have been ruled out:   None   Diagnoses that are still under consideration:   None   Final diagnoses:   Chest pain   Hypotension          Disposition: admit        Juan Miguel Ramirez      This note was created using Dictation Software.  This program may occasionally misinterpret certain words and phrases.      SCRIBE ATTESTATION NOTE:   I attest that I personally performed the services documented by the scribe and acknowledged and confirm the content of the note.   Nurses notes were reviewed.  Juan Miguel Ramachandran MD  05/24/24 2143

## 2024-05-25 NOTE — ADMISSIONCARE
AdmissionCare    Guideline: Cardiology, Inpatient    Based on the indications selected for the patient, the bed status of Inpatient was determined to be MET    The following indications were selected as present at the time of evaluation of the patient:      - Cardiology condition, symptom, or finding for which observation care has failed or is not considered appropriate. See General Criteria: Observation Care, General Admission Criteria, or Pediatric General Admission Criteria guideline as appropriate.    AdmissionCare documentation entered by: Ana Hung    [employer] [facility] [employer], 27th edition, Copyright © 2023 [employer] [facility] [employer], [facility] All Rights Reserved.  [id number]:00

## 2024-05-25 NOTE — HPI
48-year-old  female with a past medical history significant for coronary artery disease status post stent placed 1 year ago in her LAD, hypertension, hyperlipidemia, obesity, non-insulin-dependent type 2 diabetes (on Ozempic) presents to the ER with chest pressure starting around 3:00 a.m. this afternoon while she was in orientation for her new job.  Patient denies any previous history of chest pain in the past couple of months.  Denies any lower extremity edema.  Denies any dyspnea on exertion no shortness a breath.  Denies any headaches nausea or vomiting.    In the ER was found to have a STEMI with ST-elevation in leads V1/V2 and Depression in III.  Cardiology was contacted and plans for emergent cardiac catheterization this evening.  We have been consulted for further management after cardiac catheterization in the ICU.  Patient states she does not take Plavix but still takes aspirin.  Denies missing her meds.

## 2024-05-25 NOTE — PLAN OF CARE
Case Management Assessment     PCP: St danny Silva   Pharmacy:   [id number] - [place], [place]  [address]  [address] [place] [address]  Phone: [phone] Fax: [phone]        Patient Arrived From: home  Existing Help at Home: Brother and 15 & 15 yo children    Barriers to Discharge: none    Discharge Plan:    A. Home with Family   B. tbd      Patient is in the ICU.  She stated that her brother and her 2 teenage children live with her.  She is independent.  Her sister helps hr to get to the Doctors and will help her to get home at time of DC.       05/25/24 6986   Discharge Assessment   Assessment Type Discharge Planning Assessment   Confirmed/corrected address, phone number and insurance Yes   Confirmed Demographics Correct on Facesheet   Source of Information patient   Communicated MIGUELITO with patient/caregiver Yes   People in Home sibling(s);child(gen), dependent   Do you expect to return to your current living situation? Yes   Do you have help at home or someone to help you manage your care at home? Yes   Prior to hospitilization cognitive status: Alert/Oriented   Current cognitive status: Alert/Oriented   Walking or Climbing Stairs Difficulty no   Dressing/Bathing Difficulty no   Home Accessibility stairs to enter home   Number of Stairs, Main Entrance other (see comments)  (12)   Surface of Stairs, Main Entrance concrete   Stair Railings, Main Entrance railings on both sides of stairs   Landing, Stairs, Main Entrance adequate turning radius   Home Layout Able to live on 1st floor   Equipment Currently Used at Home none   Readmission within 30 days? No   Patient currently being followed by outpatient case management? No   Do you currently have service(s) that help you manage your care at home? No   Do you take prescription medications? Yes   Do you have prescription coverage? Yes   Do you have any problems  affording any of your prescribed medications? No   Is the patient taking medications as prescribed? yes   How do you get to doctors appointments? family or friend will provide   Are you on dialysis? No   Do you take coumadin? No   Discharge Plan A Home with family   Discharge Plan B   (tbd)   DME Needed Upon Discharge    (tbd)   Discharge Plan discussed with: Patient   Transition of Care Barriers None

## 2024-05-25 NOTE — ASSESSMENT & PLAN NOTE
Resume statin at this time.  Follow up on fasting lipid profile.  Any further changes pending results.

## 2024-05-25 NOTE — PLAN OF CARE
Pt admitted to ICU post cath lab. Vascband in place to R radial artery, removed at 01:00. Safety precautions in place at all times.       Problem: Adult Inpatient Plan of Care  Goal: Plan of Care Review  Outcome: Progressing     Problem: Adult Inpatient Plan of Care  Goal: Optimal Comfort and Wellbeing  Outcome: Progressing     Problem: Adult Inpatient Plan of Care  Goal: Readiness for Transition of Care  Outcome: Progressing

## 2024-05-25 NOTE — ASSESSMENT & PLAN NOTE
Anterior STEMI caused by InStent thrombosis.  Patient states that she was compliant with her medications for 2  months after the PCI, but then started becoming noncompliant  and takes her medications only once or twice a week.  Initiate aspirin 81 mg daily, Brilinta 90 mg b.I.d., statins with goal LDL less than 70, check 2D echo.  Blood pressure currently soft, will initiate therapy for her ischemic cardiomyopathy as blood pressure tolerates with long-acting beta blockers, Ace/ARB.  Currently euvolemic on exam     Importance of compliance with dual antiplatelet therapy discussed in detail with the patient.  Patient understands and agrees that she will be compliant with her medications from now on      Notes from May 25th 2024: Patient doing fine.  Denies chest pains at rest on exertion, orthopnea, PND.  Continue aspirin 81 mg daily Brilinta 90 mg b.i.d., statins, aggressive risk-factor modification.

## 2024-05-25 NOTE — SUBJECTIVE & OBJECTIVE
Interval History:  Patient doing fine.  No complications from angiogram yesterday.    Review of Systems   Constitutional: Negative.   HENT: Negative.     Eyes: Negative.    Cardiovascular: Negative.    Respiratory: Negative.     Endocrine: Negative.    Hematologic/Lymphatic: Negative.    Skin: Negative.    Musculoskeletal: Negative.    Gastrointestinal: Negative.    Genitourinary: Negative.    Neurological: Negative.    Psychiatric/Behavioral: Negative.     Allergic/Immunologic: Negative.      Objective:     Vital Signs (Most Recent):  Temp: 98.8 °F (37.1 °C) (05/25/24 0701)  Pulse: 82 (05/25/24 1000)  Resp: (!) 22 (05/25/24 1000)  BP: 102/78 (05/25/24 1000)  SpO2: 100 % (05/25/24 1000) Vital Signs (24h Range):  Temp:  [98.1 °F (36.7 °C)-99.1 °F (37.3 °C)] 98.8 °F (37.1 °C)  Pulse:  [] 82  Resp:  [17-73] 22  SpO2:  [90 %-100 %] 100 %  BP: ()/() 102/78     Weight: 93.4 kg (205 lb 14.6 oz)  Body mass index is 33.23 kg/m².     SpO2: 100 %         Intake/Output Summary (Last 24 hours) at 5/25/2024 1048  Last data filed at 5/25/2024 0800  Gross per 24 hour   Intake 120 ml   Output --   Net 120 ml       Lines/Drains/Airways       Peripheral Intravenous Line  Duration                  Peripheral IV - Single Lumen 05/24/24 2009 20 G Left Antecubital <1 day         Peripheral IV - Single Lumen 05/24/24 2100 20 G Anterior;Left;Distal Upper Arm <1 day                       Physical Exam  Constitutional:       Appearance: Normal appearance. She is well-developed.   HENT:      Head: Normocephalic.   Eyes:      Pupils: Pupils are equal, round, and reactive to light.   Cardiovascular:      Rate and Rhythm: Normal rate and regular rhythm.   Pulmonary:      Effort: Pulmonary effort is normal.      Breath sounds: Normal breath sounds.   Abdominal:      General: Bowel sounds are normal.      Palpations: Abdomen is soft.      Tenderness: There is no abdominal tenderness.   Musculoskeletal:         General: Normal  range of motion.      Cervical back: Normal range of motion and neck supple.   Skin:     General: Skin is warm.   Neurological:      Mental Status: She is alert and oriented to person, place, and time.            Significant Labs:     DATA:     Laboratory:  CBC:  Recent Labs   Lab 05/14/24 2026 05/24/24 1947 05/25/24 0434   WBC 11.46 12.69 13.52 H   Hemoglobin 14.5 15.6 14.3   Hematocrit 42.0 44.0 42.5   Platelets 307 300 299       CHEMISTRIES:  Recent Labs   Lab 05/14/24 2026 05/24/24 1947 05/25/24  0434   Glucose 301 H 322 H 306 H   Sodium 137 137 138   Potassium 4.5 4.3 4.1   BUN 12 9 9   Creatinine 1.0 0.9 0.8   Calcium 10.1 9.6 9.2   Magnesium  --  2.1  --        CARDIAC BIOMARKERS:  Recent Labs   Lab 05/24/24 1947 05/24/24 2244 05/25/24  0434   Troponin I 0.205 H 1.472 H 7.776 H       COAGS:  Recent Labs   Lab 05/23/23  0112   INR 1.0       LIPIDS/LFTS:  Recent Labs   Lab 05/23/23 0112 05/14/24 2026 05/24/24 1947 05/25/24  0434   Cholesterol 215 H  --   --  172   Triglycerides 160 H  --   --  95   HDL 35 L  --   --  30 L   LDL Cholesterol 148.0  --   --  123.0   Non-HDL Cholesterol 180  --   --  142   AST 33 23 36  --    ALT 39 29 37  --        Hemoglobin A1C   Date Value Ref Range Status   05/23/2023 9.4 (H) 4.0 - 5.6 % Final     Comment:     ADA Screening Guidelines:  5.7-6.4%  Consistent with prediabetes  >or=6.5%  Consistent with diabetes    High levels of fetal hemoglobin interfere with the HbA1C  assay. Heterozygous hemoglobin variants (HbS, HgC, etc)do  not significantly interfere with this assay.   However, presence of multiple variants may affect accuracy.         TSH  Recent Labs   Lab 05/25/24  0434   TSH 0.863       The ASCVD Risk score (Claire DK, et al., 2019) failed to calculate for the following reasons:    The patient has a prior MI or stroke diagnosis       BNP    Lab Results   Component Value Date/Time    BNP 17 05/14/2024 08:26 PM    BNP 22 05/23/2023 01:12 AM             ECHO    Results for orders placed during the hospital encounter of 05/23/23    Echo    Interpretation Summary  · The left ventricle is normal in size with mildly decreased systolic function.  · The estimated ejection fraction is 40%.  · Mid-distal anteroapical akinesis.  · Grade I left ventricular diastolic dysfunction.  · Normal right ventricular size with normal right ventricular systolic function.  · The estimated PA systolic pressure is 37 mmHg.      STRESS TEST    No results found for this or any previous visit.        CATH    Results for orders placed during the hospital encounter of 05/24/24    Cardiac catheterization    Conclusion    Patient came in with anterior STEMI caused by InStent thrombosis of previously placed LAD stent.  Balloon angioplasty of the stent was performed with excellent angiographic results.  IVUS post PCI revealed well-expanded and apposed stent without any proximal or distal dissections.  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  No new stents were placed and we were able to restore GOLDIE 3 flow with balloon angioplasty    There was single vessel coronary artery disease.    The Prox LAD lesion was 100% stenosed with 0% stenosis post-intervention.    The post-procedure left ventricular end diastolic pressure was 32.    The estimated blood loss was <50 mL.    Procedures performed:    Coronary angiography  2.  Balloon angioplasty of proximal LAD    3. IVUS of LAD    Coronary angiography:    Left main: No significant stenosis    Lad:  InStent thrombosis with 100% occluded stent with GOLDIE 0 flow on presentation.  GOLDIE 3 flow post PCI.  IVUS guidance used for this  balloon angioplasty    Circumflex: Luminal irregularities    RCA:  Luminal irregularities    Access: Right radial artery access    Assessment plan    Importance of compliance with dual antiplatelet therapy stress to the patient.  Importance of risk factor modification stress to the patient    Aspirin 81 mg daily  indefinitely    Brilinta 90 mg b.I.d. for at least 1 year and dual antiplatelet therapy for longer than 1 year if no contraindications    Statins with goal LDL less than 70    Weight loss and aggressive risk factor modification      The procedure log was documented by Documenter: Marla Harp RVT and verified by Zaire Carroll MD.    Date: 5/24/2024  Time: 10:25 PM

## 2024-05-25 NOTE — ASSESSMENT & PLAN NOTE
Plan for emergent cardiac catheterization this evening with Dr. Carroll.  We will follow up afterwards.  Patient got loaded with 325 aspirin, heparin and started on heparin drip, was also started on Ticagrelor 180mg x1.  We will follow up after catheterization.  Further orders per recommendation of Cardiology.  [facility] ,    Patient came in with anterior STEMI caused by InStent thrombosis of previously placed LAD stent.  Balloon angioplasty of the stent was performed with excellent angiographic results.  IVUS post PCI revealed well-expanded and apposed stent without any proximal or distal dissections.  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  No new stents were placed and we were able to restore GOLDIE 3 flow with balloon angioplasty    There was single vessel coronary artery disease.    The Prox LAD lesion was 100% stenosed with 0% stenosis post-intervention.  Cardiology started on Miesha,send to pharmacy,SW arranging for coupon.

## 2024-05-25 NOTE — SUBJECTIVE & OBJECTIVE
Past Medical History:   Diagnosis Date    Diabetes mellitus        Past Surgical History:   Procedure Laterality Date    ANGIOGRAM, CORONARY, WITH LEFT HEART CATHETERIZATION N/A 5/23/2023    Procedure: Angiogram, Coronary, with Left Heart Cath;  Surgeon: Jass Gibbs MD;  Location: [facility] CATH LAB;  Service: Cardiology;  Laterality: N/A;    IVUS, CORONARY  5/23/2023    Procedure: IVUS, Coronary;  Surgeon: Jass Gibbs MD;  Location: [facility] CATH LAB;  Service: Cardiology;;    PTCA, SINGLE VESSEL  5/23/2023    Procedure: PTCA, Single Vessel;  Surgeon: Jass Gibbs MD;  Location: [facility] CATH LAB;  Service: Cardiology;;    STENT, DRUG ELUTING, SINGLE VESSEL, CORONARY  5/23/2023    Procedure: Stent, Drug Eluting, Single Vessel, Coronary;  Surgeon: Jass Gibbs MD;  Location: [facility] CATH LAB;  Service: Cardiology;;       Review of patient's allergies indicates:  No Known Allergies    No current facility-administered medications on file prior to encounter.     Current Outpatient Medications on File Prior to Encounter   Medication Sig    aspirin (ECOTRIN) 81 MG EC tablet Take 1 tablet (81 mg total) by mouth once daily.    atorvastatin (LIPITOR) 80 MG tablet Take 1 tablet (80 mg total) by mouth every evening.    clopidogreL (PLAVIX) 75 mg tablet Take 1 tablet (75 mg total) by mouth once daily.    losartan (COZAAR) 25 MG tablet Take 1 tablet (25 mg total) by mouth once daily.    metoprolol succinate (TOPROL-XL) 25 MG 24 hr tablet Take 1 tablet (25 mg total) by mouth once daily.    nitroGLYCERIN (NITROSTAT) 0.4 MG SL tablet Place 1 tablet (0.4 mg total) under the tongue every 5 (five) minutes as needed for Chest pain.    OZEMPIC 0.25 mg or 0.5 mg (2 mg/3 mL) pen injector Inject 0.5 mg into the skin every 7 days.    [DISCONTINUED] albuterol 90 mcg/actuation inhaler Inhale 1-2 puffs into the lungs every 6 (six) hours as needed for Wheezing. Rescue    [DISCONTINUED] blood sugar diagnostic Strp USE TO TEST  "BLOOD GLUCOSE THREE TIMES DAILY    [DISCONTINUED] blood-glucose meter Misc USE AS INSTRUCTED    [DISCONTINUED] fluticasone (FLONASE) 50 mcg/actuation nasal spray 1 spray (50 mcg total) by Each Nare route 2 (two) times daily as needed.    [DISCONTINUED] insulin (LANTUS SOLOSTAR U-100 INSULIN) glargine 100 units/mL SubQ pen Inject 20 Units into the skin every evening.    [DISCONTINUED] insulin aspart U-100 (NOVOLOG FLEXPEN U-100 INSULIN) 100 unit/mL (3 mL) InPn pen Inject 7 Units into the skin 3 (three) times daily with meals.    [DISCONTINUED] lancets 33 gauge Misc USE TO TEST BLOOD GLUCOSE THREE TIMES DAILY    [DISCONTINUED] loratadine (CLARITIN) 10 mg tablet Take 1 tablet (10 mg total) by mouth once daily.    [DISCONTINUED] meloxicam (MOBIC) 7.5 MG tablet Take 1 tablet (7.5 mg total) by mouth once daily.    [DISCONTINUED] pen needle, diabetic 32 gauge x 5/32" Ndle USE TO INJECT INSULIN FOUR TIMES DAILY AS DIRECTED     Family History    None       Tobacco Use    Smoking status: Never    Smokeless tobacco: Never   Substance and Sexual Activity    Alcohol use: No    Drug use: No    Sexual activity: Not Currently     Birth control/protection: None     Review of Systems   Constitutional:  Negative for activity change, appetite change, chills, diaphoresis, fatigue and fever.   HENT:  Negative for sinus pressure, sinus pain and sore throat.    Eyes:  Negative for visual disturbance.   Respiratory:  Negative for cough, chest tightness, shortness of breath and wheezing.    Cardiovascular:  Positive for chest pain. Negative for palpitations and leg swelling.   Gastrointestinal:  Negative for abdominal pain, nausea and vomiting.   Genitourinary:  Negative for dysuria.   Musculoskeletal:  Negative for arthralgias and myalgias.   Neurological:  Negative for light-headedness and headaches.   Psychiatric/Behavioral:  The patient is not nervous/anxious.      Objective:     Vital Signs (Most Recent):  Temp: 99.1 °F (37.3 °C) " (05/24/24 1942)  Pulse: 71 (05/24/24 2106)  Resp: (!) 30 (05/24/24 2106)  BP: 98/67 (05/24/24 2106)  SpO2: 97 % (05/24/24 2109) Vital Signs (24h Range):  Temp:  [99.1 °F (37.3 °C)] 99.1 °F (37.3 °C)  Pulse:  [] 71  Resp:  [18-30] 30  SpO2:  [90 %-99 %] 97 %  BP: ()/() 98/67     Weight: 104.3 kg (230 lb)  Body mass index is 37.12 kg/m².     Physical Exam  Vitals and nursing note reviewed.   Constitutional:       General: She is not in acute distress.     Appearance: Normal appearance. She is obese. She is diaphoretic. She is not ill-appearing or toxic-appearing.   HENT:      Head: Normocephalic and atraumatic.      Nose: Nose normal. No congestion or rhinorrhea.      Mouth/Throat:      Mouth: Mucous membranes are moist.      Pharynx: Oropharynx is clear. No oropharyngeal exudate or posterior oropharyngeal erythema.   Eyes:      General: No scleral icterus.     Extraocular Movements: Extraocular movements intact.      Conjunctiva/sclera: Conjunctivae normal.      Pupils: Pupils are equal, round, and reactive to light.   Neck:      Vascular: No carotid bruit.   Cardiovascular:      Rate and Rhythm: Normal rate and regular rhythm.      Pulses: Normal pulses.      Heart sounds: No murmur heard.     No friction rub. No gallop.   Pulmonary:      Effort: Pulmonary effort is normal.      Breath sounds: Normal breath sounds. No wheezing, rhonchi or rales.   Abdominal:      General: Bowel sounds are normal. There is no distension.      Palpations: Abdomen is soft.      Tenderness: There is no abdominal tenderness. There is no guarding or rebound.   Musculoskeletal:         General: No swelling. Normal range of motion.      Cervical back: Normal range of motion and neck supple.      Right lower leg: No edema.      Left lower leg: Edema present.   Skin:     General: Skin is warm.      Capillary Refill: Capillary refill takes less than 2 seconds.      Coloration: Skin is pale.   Neurological:      General: No  focal deficit present.      Mental Status: She is alert and oriented to person, place, and time.      Cranial Nerves: No cranial nerve deficit.      Motor: No weakness.      Coordination: Coordination normal.   Psychiatric:         Mood and Affect: Mood normal.         Behavior: Behavior normal.              CRANIAL NERVES     CN III, IV, VI   Pupils are equal, round, and reactive to light.       Significant Labs: All pertinent labs within the past 24 hours have been reviewed.  Recent Lab Results         05/24/24 1947        Albumin 3.9              ALT 37       Anion Gap 12       AST 36       Baso # 0.04       Basophil % 0.3       BILIRUBIN TOTAL 0.6  Comment: For infants and newborns, interpretation of results should be based  on gestational age, weight and in agreement with clinical  observations.    Premature Infant recommended reference ranges:  Up to 24 hours.............<8.0 mg/dL  Up to 48 hours............<12.0 mg/dL  3-5 days..................<15.0 mg/dL  6-29 days.................<15.0 mg/dL         BUN 9       Calcium 9.6       Chloride 104       CO2 21       Creatinine 0.9       Differential Method Automated       eGFR >60       Eos # 0.1       Eos % 1.0       Glucose 322       Gran # (ANC) 9.3       Gran % 73.4       Hematocrit 44.0       Hemoglobin 15.6       Immature Grans (Abs) 0.03  Comment: Mild elevation in immature granulocytes is non specific and   can be seen in a variety of conditions including stress response,   acute inflammation, trauma and pregnancy. Correlation with other   laboratory and clinical findings is essential.         Immature Granulocytes 0.2       Lymph # 2.8       Lymph % 21.7       MCH 29.9       MCHC 35.5       MCV 84       Mono # 0.4       Mono % 3.4       MPV 11.6       nRBC 0       Platelet Count 300       Potassium 4.3  Comment: Specimen moderately hemolyzed       PROTEIN TOTAL 7.8       RBC 5.22       RDW 11.2       Sodium 137       Troponin I 0.205  Comment:  The reference interval for Troponin I represents the 99th percentile   cutoff   for our facility and is consistent with 3rd generation assay   performance.         WBC 12.69               Significant Imaging: I have reviewed all pertinent imaging results/findings within the past 24 hours.

## 2024-05-25 NOTE — CONSULTS
[facility] [place] [facility]  [place]  Consult Note    Patient Name: Eusebia Del Valle  MRN: [id number]  Admission Date: 5/24/2024  Hospital Length of Stay: 0 days  Code Status: Full Code   Attending Provider: Madeline Prescott, Stephany   Consulting Provider: Zaire Carroll MD  Primary Care Physician: Unable, To Obtain  Principal Problem:STEMI (ST elevation myocardial infarction)    Patient information was obtained from patient and ER records.     Inpatient consult to Cardiology  Consult performed by: Zaire Carroll MD  Consult ordered by: Genevieve Hui MD        Subjective:     Chief Complaint:  stemi     HPI:    Patient is a 48-year-old lady with past medical history of coronary artery disease status post PCI of LAD for ST-elevation MI in May of 2023.  She underwent PCI of LAD.  After that followed  with  [facility] Cardiology.   Came in with chest pain for the past few hours. Initial EKG showed sinus rhythm with anteroseptal infarction, subsequent EKG showed ST elevation in V2 as well as ST depressions in 2 3 AVF with some ST elevation seen in the lateral leads also.  Because of ongoing chest pain despite 2 nitroglycerin, cath lab was activated by ER    Patient states that her chest pain started today at 3:00 p.m..  At 5:00 p.m. had some Check filet.  Chest pain did not go away.  Came to the ER with on and off chest pain.  Cath lab was activated by the ER based on the 2nd EKG demonstrating greater ST elevations in lead V2 and also because the fact that she kept on having chest pains.  Was taken emergently to the cardiac catheterization laboratory after informed consent obtained.  It revealed stent thrombosis of the previously placed LAD stent.  PTCA was performed with IVUS guidance with excellent angiographic results.  No new stents were placed.  It also revealed mid LAD myocardial bridging    Risks, benefits and alternatives of the catheterization procedure were discussed with the patient.The risks of coronary  angiography include but are not limited to: bleeding, infection, death, heart attack, arrhythmia, kidney injury or failure, potential need for dialysis, allergic reactions, stroke, need for emergency surgery, hematoma, pseudoaneurysm etc.  Should stenting be indicated, the patient has agreed to dual anti-platelet therapy for 1-consecutive year with a drug-eluting stent and a minimum of 1-month with the use of a bare metal stent. Additionally, pt is aware that non-compliance is likely to result in stent clotting with heart attack, heart failure, and/or death  The risks of moderate sedation include hypotension, respiratory depression, arrhythmias, bronchospasm, and death. Informed consent was obtained and the  patient is agreeable to proceed with the procedure. Consent was placed on the chart.      Results for orders placed or performed during the hospital encounter of 05/14/24   EKG 12-lead    Collection Time: 05/14/24  7:28 PM   Result Value Ref Range    QRS Duration 88 ms    OHS QTC Calculation 449 ms    Narrative    Test Reason : R07.9,    Vent. Rate : 084 BPM     Atrial Rate : 084 BPM     P-R Int : 136 ms          QRS Dur : 088 ms      QT Int : 380 ms       P-R-T Axes : 022 -08 027 degrees     QTc Int : 449 ms    Normal sinus rhythm  Low voltage QRS  Septal infarct (cited on or before 23-MAY-2023)  Abnormal ECG  When compared with ECG of 23-MAY-2023 02:59,  Significant changes have occurred  Confirmed by Alex Cobian MD ([id number]) on 5/15/2024 2:36:46 PM    Referred By: [email or web address]   SELF           Confirmed By:Alex Cobian MD       Results for orders placed during the hospital encounter of 05/23/23    Echo    Interpretation Summary  · The left ventricle is normal in size with mildly decreased systolic function.  · The estimated ejection fraction is 40%.  · Mid-distal anteroapical akinesis.  · Grade I left ventricular diastolic dysfunction.  · Normal right ventricular size with normal right ventricular systolic  "function.  · The estimated PA systolic pressure is 37 mmHg.      No results found for this or any previous visit.      Results for orders placed during the hospital encounter of 05/23/23    Cardiac catheterization    Conclusion  Description of the findings of the procedure: uneventful LHC/cor angio/PCI (IVUS guided) prox LAD 3.5x24 Synergy VIGNESH/R rad vasband    Findings/Key Components:  LVEDP: 4mmHg  LVEF: echo ordered    Dominance: Right  LM: normal  LAD: prox 99% thrombotic stenosis, T2 flow.  Post-PCI mid 30-40% stenosis noted.  Ramus: normal  LCx: normal  RCA: normal    PCI prox LAD:  Preop ASA/Plavix/heparin  Predil 2.5x12  IVUS for stent sizing  Stent 3.5x24 Synergy VIGNESH 16 munir  Post-IVUS with good stent expansion and apposition, no dissection  Excellent angiographic result, T3 flow, 0% residual stenosis    Hemostasis:  R Radial band            HPI: Eusebia Del Valle is a 48 y.o. female, with a PMHx of DM 2, HLD, obesity, CVA , who presents to the ED via EMS with acute chest tightness that began around 3:30 PM today after work. Patient reports chest pain and tightness, minor relief s/p vomiting and total relief with aspirin and nitroglycerin administered by EMS, associated dyspnea that felt like she was "choking" and "couldn't catch her breath" when lying down, temporary relief with "counting." Reports that she feels fine now, but that she took her medications later than usual today. Also reports chronic, intermittent, bilateral LE and neck "numbness." No other exacerbating or alleviating factors. Denies bilateral LE edema or other associated symptoms. Reports a bcg reading of 270, notes baseline in the 500s, noncompliance with her medications from "working too much." Patient reports a PSHx of stent placement.      ER course  per ER physician:    1932 My independent interpretation of the EKG is sinus rhythm at a rate of 97.  QT corrected is normal.  There is low voltage throughout.  There are Q-waves in the " anterior septal leads.  There has no ST segment elevation or depression concerning for acute occlusive infarct at this time.  When compared to EKGs performed prior to this visit there is flattening of the T-wave in aVL but otherwise no acute change. []   1933 Chart review reveals that the patient's previous echo showed a EF of 40% and that she did have a stent placed due to a 99% lesion in her LAD []   1933 Patient is pain-free at this time []       ED Course User Index  [] Juan Miguel Ramirez MD           Past Medical History:   Diagnosis Date    Diabetes mellitus        Past Surgical History:   Procedure Laterality Date    ANGIOGRAM, CORONARY, WITH LEFT HEART CATHETERIZATION N/A 5/23/2023    Procedure: Angiogram, Coronary, with Left Heart Cath;  Surgeon: Jass Gibbs MD;  Location: [facility] CATH LAB;  Service: Cardiology;  Laterality: N/A;    IVUS, CORONARY  5/23/2023    Procedure: IVUS, Coronary;  Surgeon: Jass Gibbs MD;  Location: [facility] CATH LAB;  Service: Cardiology;;    PTCA, SINGLE VESSEL  5/23/2023    Procedure: PTCA, Single Vessel;  Surgeon: Jass Gibbs MD;  Location: [facility] CATH LAB;  Service: Cardiology;;    STENT, DRUG ELUTING, SINGLE VESSEL, CORONARY  5/23/2023    Procedure: Stent, Drug Eluting, Single Vessel, Coronary;  Surgeon: Jass Gibbs MD;  Location: [facility] CATH LAB;  Service: Cardiology;;       Review of patient's allergies indicates:  No Known Allergies    No current facility-administered medications on file prior to encounter.     Current Outpatient Medications on File Prior to Encounter   Medication Sig    albuterol 90 mcg/actuation inhaler Inhale 1-2 puffs into the lungs every 6 (six) hours as needed for Wheezing. Rescue    aspirin (ECOTRIN) 81 MG EC tablet Take 1 tablet (81 mg total) by mouth once daily.    atorvastatin (LIPITOR) 80 MG tablet Take 1 tablet (80 mg total) by mouth every evening.    blood sugar diagnostic Strp USE TO TEST BLOOD GLUCOSE THREE TIMES  "DAILY    blood-glucose meter Misc USE AS INSTRUCTED    clopidogreL (PLAVIX) 75 mg tablet Take 1 tablet (75 mg total) by mouth once daily.    fluticasone (FLONASE) 50 mcg/actuation nasal spray 1 spray (50 mcg total) by Each Nare route 2 (two) times daily as needed.    insulin (LANTUS SOLOSTAR U-100 INSULIN) glargine 100 units/mL SubQ pen Inject 20 Units into the skin every evening.    insulin aspart U-100 (NOVOLOG FLEXPEN U-100 INSULIN) 100 unit/mL (3 mL) InPn pen Inject 7 Units into the skin 3 (three) times daily with meals.    lancets 33 gauge Misc USE TO TEST BLOOD GLUCOSE THREE TIMES DAILY    loratadine (CLARITIN) 10 mg tablet Take 1 tablet (10 mg total) by mouth once daily.    losartan (COZAAR) 25 MG tablet Take 1 tablet (25 mg total) by mouth once daily.    meloxicam (MOBIC) 7.5 MG tablet Take 1 tablet (7.5 mg total) by mouth once daily.    metoprolol succinate (TOPROL-XL) 25 MG 24 hr tablet Take 1 tablet (25 mg total) by mouth once daily.    nitroGLYCERIN (NITROSTAT) 0.4 MG SL tablet Place 1 tablet (0.4 mg total) under the tongue every 5 (five) minutes as needed for Chest pain.    pen needle, diabetic 32 gauge x 5/32" Ndle USE TO INJECT INSULIN FOUR TIMES DAILY AS DIRECTED     Family History    None       Tobacco Use    Smoking status: Never    Smokeless tobacco: Never   Substance and Sexual Activity    Alcohol use: No    Drug use: No    Sexual activity: Not Currently     Birth control/protection: None     Review of Systems   Constitutional: Negative.   HENT: Negative.     Eyes: Negative.    Cardiovascular:  Positive for chest pain.   Respiratory: Negative.     Endocrine: Negative.    Hematologic/Lymphatic: Negative.    Skin: Negative.    Musculoskeletal: Negative.    Gastrointestinal: Negative.    Genitourinary: Negative.    Neurological: Negative.    Psychiatric/Behavioral: Negative.     Allergic/Immunologic: Negative.      Objective:     Vital Signs (Most Recent):  Temp: 99.1 °F (37.3 °C) (05/24/24 " 1942)  Pulse: 88 (05/24/24 2032)  Resp: 20 (05/24/24 2032)  BP: (!) 144/91 (05/24/24 2032)  SpO2: (!) 94 % (05/24/24 2024) Vital Signs (24h Range):  Temp:  [99.1 °F (37.3 °C)] 99.1 °F (37.3 °C)  Pulse:  [] 88  Resp:  [18-28] 20  SpO2:  [94 %-99 %] 94 %  BP: (128-155)/() 144/91     Weight: 104.3 kg (230 lb)  Body mass index is 37.12 kg/m².    SpO2: (!) 94 %       No intake or output data in the 24 hours ending 05/24/24 2047    Lines/Drains/Airways       Peripheral Intravenous Line  Duration                  Peripheral IV - Single Lumen 05/24/24 2009 20 G Left Antecubital <1 day                     Physical Exam  Constitutional:       Appearance: Normal appearance. She is well-developed.   HENT:      Head: Normocephalic.   Eyes:      Pupils: Pupils are equal, round, and reactive to light.   Cardiovascular:      Rate and Rhythm: Normal rate and regular rhythm.   Pulmonary:      Effort: Pulmonary effort is normal.      Breath sounds: Normal breath sounds.   Abdominal:      General: Bowel sounds are normal.      Palpations: Abdomen is soft.      Tenderness: There is no abdominal tenderness.   Musculoskeletal:         General: Normal range of motion.      Cervical back: Normal range of motion and neck supple.   Skin:     General: Skin is warm.   Neurological:      Mental Status: She is alert and oriented to person, place, and time.          Significant Labs:      DATA:     Laboratory:  CBC:  Recent Labs   Lab 05/24/23  0353 05/14/24  2026 05/24/24  1947   WBC 11.57 11.46 12.69   Hemoglobin 15.1 14.5 15.6   Hematocrit 45.4 42.0 44.0   Platelets 345 307 300       CHEMISTRIES:  Recent Labs   Lab 05/24/23  0353 05/14/24  2026 05/24/24  1947   Glucose 250 H 301 H 322 H   Sodium 137 137 137   Potassium 3.8 4.5 4.3   BUN 12 12 9   Creatinine 0.7 1.0 0.9   Calcium 9.1 10.1 9.6       CARDIAC BIOMARKERS:  Recent Labs   Lab 05/23/23  0112 05/14/24 2026 05/14/24  2246   Troponin I 0.075 H 0.025 <0.006        COAGS:  Recent Labs   Lab 05/23/23 0112   INR 1.0       LIPIDS/LFTS:  Recent Labs   Lab 05/23/23 0112 05/14/24 2026 05/24/24  1947   Cholesterol 215 H  --   --    Triglycerides 160 H  --   --    HDL 35 L  --   --    LDL Cholesterol 148.0  --   --    Non-HDL Cholesterol 180  --   --    AST 33 23 36   ALT 39 29 37       Hemoglobin A1C   Date Value Ref Range Status   05/23/2023 9.4 (H) 4.0 - 5.6 % Final     Comment:     ADA Screening Guidelines:  5.7-6.4%  Consistent with prediabetes  >or=6.5%  Consistent with diabetes    High levels of fetal hemoglobin interfere with the HbA1C  assay. Heterozygous hemoglobin variants (HbS, HgC, etc)do  not significantly interfere with this assay.   However, presence of multiple variants may affect accuracy.         TSH        The 10-year ASCVD risk score (Claire WHEATLEY, et al., 2019) is: 11.3%    Values used to calculate the score:      Age: 48 years      Sex: Female      Is Non- : Yes      Diabetic: Yes      Tobacco smoker: No      Systolic Blood Pressure: 144 mmHg      Is BP treated: No      HDL Cholesterol: 35 mg/dL      Total Cholesterol: 215 mg/dL       BNP    Lab Results   Component Value Date/Time    BNP 17 05/14/2024 08:26 PM    BNP 22 05/23/2023 01:12 AM            ECHO    Results for orders placed during the hospital encounter of 05/23/23    Echo    Interpretation Summary  · The left ventricle is normal in size with mildly decreased systolic function.  · The estimated ejection fraction is 40%.  · Mid-distal anteroapical akinesis.  · Grade I left ventricular diastolic dysfunction.  · Normal right ventricular size with normal right ventricular systolic function.  · The estimated PA systolic pressure is 37 mmHg.      STRESS TEST    No results found for this or any previous visit.        CATH    Results for orders placed during the hospital encounter of 05/23/23    Cardiac catheterization    Conclusion  Description of the findings of the procedure:  uneventful LHC/cor angio/PCI (IVUS guided) prox LAD 3.5x24 Synergy VIGNESH/R rad vasband    Findings/Key Components:  LVEDP: 4mmHg  LVEF: echo ordered    Dominance: Right  LM: normal  LAD: prox 99% thrombotic stenosis, T2 flow.  Post-PCI mid 30-40% stenosis noted.  Ramus: normal  LCx: normal  RCA: normal    PCI prox LAD:  Preop ASA/Plavix/heparin  Predil 2.5x12  IVUS for stent sizing  Stent 3.5x24 Synergy VIGNESH 16 munir  Post-IVUS with good stent expansion and apposition, no dissection  Excellent angiographic result, T3 flow, 0% residual stenosis    Hemostasis:  R Radial band      Assessment and Plan:     * STEMI (ST elevation myocardial infarction)   Anterior STEMI caused by InStent thrombosis.  Patient states that she was compliant with her medications for 2  months after the PCI, but then started becoming noncompliant  and takes her medications only once or twice a week.  Initiate aspirin 81 mg daily, Brilinta 90 mg b.I.d., statins with goal LDL less than 70, check 2D echo.  Blood pressure currently soft, will initiate therapy for her ischemic cardiomyopathy as blood pressure tolerates with long-acting beta blockers, Ace/ARB.  Currently euvolemic on exam     Importance of compliance with dual antiplatelet therapy discussed in detail with the patient.  Patient understands and agrees that she will be compliant with her medications from now on    Class 1 obesity in adult  Body mass index is 37.12 kg/m². Morbid obesity complicates all aspects of disease management from diagnostic modalities to treatment. Weight loss encouraged and health benefits explained to patient.         Hyperlipidemia   Goal LDL less than 70      Type 2 diabetes mellitus with circulatory disorder, without long-term current use of insulin   Management per primary        VTE Risk Mitigation (From admission, onward)           Ordered     heparin infusion 1,000 units/500 ml in 0.9% NaCl (pressure line flush)  Intra-op continuous PRN         05/24/24 9757      heparin (porcine) injection  As needed (PRN)         05/24/24 2149     Reason for No Pharmacological VTE Prophylaxis  Once        Question:  Reasons:  Answer:  Physician Provided (leave comment)  Comment:  heparin drip    05/24/24 2126     IP VTE HIGH RISK PATIENT  Once         05/24/24 2126     Place sequential compression device  Until discontinued         05/24/24 2126                    Thank you for your consult. I will follow-up with patient. Please contact us if you have any additional questions.    Zaire Carroll MD  Cardiology   [facility] Cath Lab      Critical Care Time:  45 minutes     Critical care was time spent personally by me on the following activities: development of treatment plan with patient or surrogate and bedside caregivers, discussions with consultants, evaluation of patient's response to treatment, examination of patient, ordering and performing treatments and interventions, ordering and review of laboratory studies, ordering and review of radiographic studies, pulse oximetry, re-evaluation of patient's condition. This critical care time did not overlap with that of any other provider or involve time for any procedures.

## 2024-05-25 NOTE — PLAN OF CARE
Patient without chest pain all shift, no complaints.   Sleeps most of shift but arouses easily.   Right wrist site remains without hematoma or swelling.

## 2024-05-25 NOTE — ASSESSMENT & PLAN NOTE
Resume home medications.  We will follow up with Cardiology for any changes or additions that needed.  Hold parameters placed for low normal heart rate and blood pressure parameters.

## 2024-05-25 NOTE — ASSESSMENT & PLAN NOTE
Plan for emergent cardiac catheterization this evening with Dr. Carroll.  We will follow up afterwards.  Patient got loaded with 325 aspirin, heparin and started on heparin drip, was also started on Ticagrelor 180mg x1.  We will follow up after catheterization.  Further orders per recommendation of Cardiology.

## 2024-05-25 NOTE — EICU
e-ICU admit note    Reason for ICU admission:    STEMI    PTCA of stenosed previous LAD stent      HPI:    47 yo diabetic female with CP at 3 am    Initial EKG showed sinus with anteroseptal infarction.  Next EKG showed ST elevation in V2, ST depressions in II,III, AVF     Hx CAD, MI 5/23, PCI LAD.    Emergent cardiac catheterization showed thrombosis of LAD stent.  PTCA was performed.No new stent.    Pmx:    coronary artery disease status post stent LAD  Hypertension   Hyperlipidemia   Obesity   non-insulin-dependent type 2 diabetes       Home meds:    Atorvastatin  ASA  Losartan  Plavix  Metoprolol  Ozempic    Significant labs:    Glucose 322  Troponin 0.2        Pt viewed at 11 pm:    77 sinus, 98%, R 21, 105/72    Asleep    NAD    Assessment/Plan:    STEMI  Stenosis of LAS stent  New PTCA  ASA  Ticagrelol  Statin  b-b    DM  SSI    HTN  Losartan

## 2024-05-25 NOTE — SUBJECTIVE & OBJECTIVE
Interval History:   S/P [facility] [place] ,    Patient came in with anterior STEMI caused by InStent thrombosis of previously placed LAD stent.  Balloon angioplasty of the stent was performed with excellent angiographic results.  IVUS post PCI revealed well-expanded and apposed stent without any proximal or distal dissections.  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  No new stents were placed and we were able to restore GOLDIE 3 flow with balloon angioplasty    There was single vessel coronary artery disease.    The Prox LAD lesion was 100% stenosed with 0% stenosis post-intervention.  Cardiology started on Brillanta,send to pharmacy,SW arranging for coupon.  Review of Systems   Constitutional:  Positive for activity change and appetite change.   Respiratory:  Negative for apnea and choking.    Gastrointestinal:  Negative for abdominal distention and abdominal pain.   Genitourinary:  Negative for difficulty urinating and dyspareunia.   Neurological:  Positive for weakness.     Objective:     Vital Signs (Most Recent):  Temp: 98.8 °F (37.1 °C) (05/25/24 0701)  Pulse: 82 (05/25/24 1000)  Resp: (!) 22 (05/25/24 1000)  BP: 102/78 (05/25/24 1000)  SpO2: 100 % (05/25/24 1000) Vital Signs (24h Range):  Temp:  [98.1 °F (36.7 °C)-99.1 °F (37.3 °C)] 98.8 °F (37.1 °C)  Pulse:  [] 82  Resp:  [17-73] 22  SpO2:  [90 %-100 %] 100 %  BP: ()/() 102/78     Weight: 93.4 kg (205 lb 14.6 oz)  Body mass index is 33.23 kg/m².    Intake/Output Summary (Last 24 hours) at 5/25/2024 1117  Last data filed at 5/25/2024 0800  Gross per 24 hour   Intake 120 ml   Output --   Net 120 ml         Physical Exam  Cardiovascular:      Rate and Rhythm: Normal rate.   Pulmonary:      Effort: No respiratory distress.      Breath sounds: No wheezing.   Abdominal:      General: There is no distension.   Skin:     Coloration: Skin is not jaundiced.      Findings: No bruising.   Neurological:      Mental Status: She is alert and oriented to person,  place, and time.      Cranial Nerves: No cranial nerve deficit.      Motor: No weakness.             Significant Labs: All pertinent labs within the past 24 hours have been reviewed.  BMP:   Recent Labs   Lab 05/24/24 1947 05/25/24 0434   * 306*    138   K 4.3 4.1    107   CO2 21* 22*   BUN 9 9   CREATININE 0.9 0.8   CALCIUM 9.6 9.2   MG 2.1  --      CBC:   Recent Labs   Lab 05/24/24 1947 05/25/24 0434   WBC 12.69 13.52*   HGB 15.6 14.3   HCT 44.0 42.5    299     CMP:   Recent Labs   Lab 05/24/24 1947 05/25/24 0434    138   K 4.3 4.1    107   CO2 21* 22*   * 306*   BUN 9 9   CREATININE 0.9 0.8   CALCIUM 9.6 9.2   PROT 7.8  --    ALBUMIN 3.9  --    BILITOT 0.6  --    ALKPHOS 130  --    AST 36  --    ALT 37  --    ANIONGAP 12 9     Troponin:   Recent Labs   Lab 05/24/24 1947 05/24/24 2244 05/25/24 0434   TROPONINI 0.205* 1.472* 7.776*       Significant Imaging: I have reviewed all pertinent imaging results/findings within the past 24 hours.

## 2024-05-25 NOTE — ED NOTES
Pt reports sharp chest pain since about 3 pm with Hx of cardiac stents placed in June of last year. Pt states that she vomited at the time, but is no longer nauseous and her pain is better.

## 2024-05-25 NOTE — CARE UPDATE
[facility]  Nurses Note -- 4 Eyes      5/25/2024       Skin assessed on: Q Shift      [x] No Pressure Injuries Present    [x]Prevention Measures Documented    [] Yes LDA  for Pressure Injury Previously documented     [] Yes New Pressure Injury Discovered   [] LDA for New Pressure Injury Added      Attending RN:  Marjan Taylor RN     Second RN:  Martha RAUSCH

## 2024-05-25 NOTE — PROGRESS NOTES
[facility] Intensive Care  Cardiology  Progress Note    Patient Name: Eusebia Del Valle  MRN: [id number]  Admission Date: 5/24/2024  Hospital Length of Stay: 1 days  Code Status: Full Code   Attending Physician: Madeline Prescott, *   Primary Care Physician: Unable, To Obtain  Expected Discharge Date:   Principal Problem:STEMI (ST elevation myocardial infarction)    Subjective:       Interval History:  Patient doing fine.  No complications from angiogram yesterday.    Review of Systems   Constitutional: Negative.   HENT: Negative.     Eyes: Negative.    Cardiovascular: Negative.    Respiratory: Negative.     Endocrine: Negative.    Hematologic/Lymphatic: Negative.    Skin: Negative.    Musculoskeletal: Negative.    Gastrointestinal: Negative.    Genitourinary: Negative.    Neurological: Negative.    Psychiatric/Behavioral: Negative.     Allergic/Immunologic: Negative.      Objective:     Vital Signs (Most Recent):  Temp: 98.8 °F (37.1 °C) (05/25/24 0701)  Pulse: 82 (05/25/24 1000)  Resp: (!) 22 (05/25/24 1000)  BP: 102/78 (05/25/24 1000)  SpO2: 100 % (05/25/24 1000) Vital Signs (24h Range):  Temp:  [98.1 °F (36.7 °C)-99.1 °F (37.3 °C)] 98.8 °F (37.1 °C)  Pulse:  [] 82  Resp:  [17-73] 22  SpO2:  [90 %-100 %] 100 %  BP: ()/() 102/78     Weight: 93.4 kg (205 lb 14.6 oz)  Body mass index is 33.23 kg/m².     SpO2: 100 %         Intake/Output Summary (Last 24 hours) at 5/25/2024 1048  Last data filed at 5/25/2024 0800  Gross per 24 hour   Intake 120 ml   Output --   Net 120 ml       Lines/Drains/Airways       Peripheral Intravenous Line  Duration                  Peripheral IV - Single Lumen 05/24/24 2009 20 G Left Antecubital <1 day         Peripheral IV - Single Lumen 05/24/24 2100 20 G Anterior;Left;Distal Upper Arm <1 day                       Physical Exam  Constitutional:       Appearance: Normal appearance. She is well-developed.   HENT:      Head: Normocephalic.   Eyes:      Pupils: Pupils are  equal, round, and reactive to light.   Cardiovascular:      Rate and Rhythm: Normal rate and regular rhythm.   Pulmonary:      Effort: Pulmonary effort is normal.      Breath sounds: Normal breath sounds.   Abdominal:      General: Bowel sounds are normal.      Palpations: Abdomen is soft.      Tenderness: There is no abdominal tenderness.   Musculoskeletal:         General: Normal range of motion.      Cervical back: Normal range of motion and neck supple.   Skin:     General: Skin is warm.   Neurological:      Mental Status: She is alert and oriented to person, place, and time.            Significant Labs:     DATA:     Laboratory:  CBC:  Recent Labs   Lab 05/14/24 2026 05/24/24 1947 05/25/24  0434   WBC 11.46 12.69 13.52 H   Hemoglobin 14.5 15.6 14.3   Hematocrit 42.0 44.0 42.5   Platelets 307 300 299       CHEMISTRIES:  Recent Labs   Lab 05/14/24 2026 05/24/24 1947 05/25/24  0434   Glucose 301 H 322 H 306 H   Sodium 137 137 138   Potassium 4.5 4.3 4.1   BUN 12 9 9   Creatinine 1.0 0.9 0.8   Calcium 10.1 9.6 9.2   Magnesium  --  2.1  --        CARDIAC BIOMARKERS:  Recent Labs   Lab 05/24/24 1947 05/24/24 2244 05/25/24  0434   Troponin I 0.205 H 1.472 H 7.776 H       COAGS:  Recent Labs   Lab 05/23/23  0112   INR 1.0       LIPIDS/LFTS:  Recent Labs   Lab 05/23/23  0112 05/14/24 2026 05/24/24 1947 05/25/24  0434   Cholesterol 215 H  --   --  172   Triglycerides 160 H  --   --  95   HDL 35 L  --   --  30 L   LDL Cholesterol 148.0  --   --  123.0   Non-HDL Cholesterol 180  --   --  142   AST 33 23 36  --    ALT 39 29 37  --        Hemoglobin A1C   Date Value Ref Range Status   05/23/2023 9.4 (H) 4.0 - 5.6 % Final     Comment:     ADA Screening Guidelines:  5.7-6.4%  Consistent with prediabetes  >or=6.5%  Consistent with diabetes    High levels of fetal hemoglobin interfere with the HbA1C  assay. Heterozygous hemoglobin variants (HbS, HgC, etc)do  not significantly interfere with this assay.   However,  presence of multiple variants may affect accuracy.         TSH  Recent Labs   Lab 05/25/24  0434   TSH 0.863       The ASCVD Risk score (Claire DK, et al., 2019) failed to calculate for the following reasons:    The patient has a prior MI or stroke diagnosis       BNP    Lab Results   Component Value Date/Time    BNP 17 05/14/2024 08:26 PM    BNP 22 05/23/2023 01:12 AM            ECHO    Results for orders placed during the hospital encounter of 05/23/23    Echo    Interpretation Summary  · The left ventricle is normal in size with mildly decreased systolic function.  · The estimated ejection fraction is 40%.  · Mid-distal anteroapical akinesis.  · Grade I left ventricular diastolic dysfunction.  · Normal right ventricular size with normal right ventricular systolic function.  · The estimated PA systolic pressure is 37 mmHg.      STRESS TEST    No results found for this or any previous visit.        CATH    Results for orders placed during the hospital encounter of 05/24/24    Cardiac catheterization    Conclusion    Patient came in with anterior STEMI caused by InStent thrombosis of previously placed LAD stent.  Balloon angioplasty of the stent was performed with excellent angiographic results.  IVUS post PCI revealed well-expanded and apposed stent without any proximal or distal dissections.  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  No new stents were placed and we were able to restore GOLDIE 3 flow with balloon angioplasty    There was single vessel coronary artery disease.    The Prox LAD lesion was 100% stenosed with 0% stenosis post-intervention.    The post-procedure left ventricular end diastolic pressure was 32.    The estimated blood loss was <50 mL.    Procedures performed:    Coronary angiography  2.  Balloon angioplasty of proximal LAD    3. IVUS of LAD    Coronary angiography:    Left main: No significant stenosis    Lad:  InStent thrombosis with 100% occluded stent with GOLDIE 0 flow on  presentation.  GOLDIE 3 flow post PCI.  IVUS guidance used for this  balloon angioplasty    Circumflex: Luminal irregularities    RCA:  Luminal irregularities    Access: Right radial artery access    Assessment plan    Importance of compliance with dual antiplatelet therapy stress to the patient.  Importance of risk factor modification stress to the patient    Aspirin 81 mg daily indefinitely    Brilinta 90 mg b.I.d. for at least 1 year and dual antiplatelet therapy for longer than 1 year if no contraindications    Statins with goal LDL less than 70    Weight loss and aggressive risk factor modification      The procedure log was documented by Documenter: Marla Harp RVT and verified by Zaire Carroll MD.    Date: 5/24/2024  Time: 10:25 PM      Assessment and Plan:     Brief HPI:     * STEMI (ST elevation myocardial infarction)   Anterior STEMI caused by InStent thrombosis.  Patient states that she was compliant with her medications for 2  months after the PCI, but then started becoming noncompliant  and takes her medications only once or twice a week.  Initiate aspirin 81 mg daily, Brilinta 90 mg b.I.d., statins with goal LDL less than 70, check 2D echo.  Blood pressure currently soft, will initiate therapy for her ischemic cardiomyopathy as blood pressure tolerates with long-acting beta blockers, Ace/ARB.  Currently euvolemic on exam     Importance of compliance with dual antiplatelet therapy discussed in detail with the patient.  Patient understands and agrees that she will be compliant with her medications from now on      Notes from May 25th 2024: Patient doing fine.  Denies chest pains at rest on exertion, orthopnea, PND.  Continue aspirin 81 mg daily Brilinta 90 mg b.i.d., statins, aggressive risk-factor modification.    Class 1 obesity in adult  Body mass index is 37.12 kg/m². Morbid obesity complicates all aspects of disease management from diagnostic modalities to treatment. Weight loss encouraged  and health benefits explained to patient.         Hyperlipidemia   Goal LDL less than 70      Type 2 diabetes mellitus with circulatory disorder, without long-term current use of insulin   Management per primary        VTE Risk Mitigation (From admission, onward)           Ordered     heparin infusion 1,000 units/500 ml in 0.9% NaCl (pressure line flush)  Intra-op continuous PRN         05/24/24 2150     Reason for No Pharmacological VTE Prophylaxis  Once        Question:  Reasons:  Answer:  Physician Provided (leave comment)  Comment:  heparin drip    05/24/24 2126     IP VTE HIGH RISK PATIENT  Once         05/24/24 2126     Place sequential compression device  Until discontinued         05/24/24 2126                    Zaire Carroll MD  Cardiology  [facility] - Intensive Care      Critical Care Time:  35 minutes     Critical care was time spent personally by me on the following activities: development of treatment plan with patient or surrogate and bedside caregivers, discussions with consultants, evaluation of patient's response to treatment, examination of patient, ordering and performing treatments and interventions, ordering and review of laboratory studies, ordering and review of radiographic studies, pulse oximetry, re-evaluation of patient's condition. This critical care time did not overlap with that of any other provider or involve time for any procedures.

## 2024-05-26 VITALS
SYSTOLIC BLOOD PRESSURE: 99 MMHG | HEIGHT: 66 IN | OXYGEN SATURATION: 100 % | RESPIRATION RATE: 24 BRPM | BODY MASS INDEX: 33.1 KG/M2 | HEART RATE: 79 BPM | TEMPERATURE: 98 F | WEIGHT: 205.94 LBS | DIASTOLIC BLOOD PRESSURE: 67 MMHG

## 2024-05-26 LAB — POCT GLUCOSE: 233 MG/DL (ref 70–110)

## 2024-05-26 PROCEDURE — 25000003 PHARM REV CODE 250: Performed by: INTERNAL MEDICINE

## 2024-05-26 PROCEDURE — 94761 N-INVAS EAR/PLS OXIMETRY MLT: CPT

## 2024-05-26 RX ADMIN — ASPIRIN 81 MG CHEWABLE TABLET 81 MG: 81 TABLET CHEWABLE at 08:05

## 2024-05-26 RX ADMIN — INSULIN ASPART 2 UNITS: 100 INJECTION, SOLUTION INTRAVENOUS; SUBCUTANEOUS at 07:05

## 2024-05-26 RX ADMIN — TICAGRELOR 90 MG: 90 TABLET ORAL at 08:05

## 2024-05-26 NOTE — PLAN OF CARE
Case Management Final Discharge Note      Discharge Disposition: home    New DME ordered / company name: n/a    Relevant SDOH / Transition of Care Barriers:  none    Primary Caretaker and contact information: Independent    Scheduled followup appointment: [facility]    Referrals placed: n/a    Transportation: private vehicle    Patient educated on discharge services and updated on DC plan. Bedside RNPaula notified, patient clear to discharge from Case Management Perspective.        05/26/24 0850   Final Note   Assessment Type Final Discharge Note   Anticipated Discharge Disposition Home   Hospital Resources/Appts/Education Provided   (Patient will have to self schedule follow up due to weekend DC and [facility] clin rules)   Post-Acute Status   Post-Acute Authorization Other   Other Status No Post-Acute Service Needs   Discharge Delays None known at this time

## 2024-05-26 NOTE — DISCHARGE SUMMARY
[facility] Medicine  Discharge Summary      Patient Name: Eusebia Del Valle  MRN: [id number]  [facility]: [phone]  Patient Class: IP- Inpatient  Admission Date: 5/24/2024  Hospital Length of Stay: 2 days  Discharge Date and Time:  05/26/2024 8:43 AM  Attending Physician: Madeline Prescott, *   Discharging Provider: Madeline Prescott MD  Primary Care Provider: Unable, To Obtain    Primary Care Team: Networked reference to record PCT     HPI:   48-year-old  female with a past medical history significant for coronary artery disease status post stent placed 1 year ago in her LAD, hypertension, hyperlipidemia, obesity, non-insulin-dependent type 2 diabetes (on Ozempic) presents to the ER with chest pressure starting around 3:00 a.m. this afternoon while she was in orientation for her new job.  Patient denies any previous history of chest pain in the past couple of months.  Denies any lower extremity edema.  Denies any dyspnea on exertion no shortness a breath.  Denies any headaches nausea or vomiting.    In the ER was found to have a STEMI with ST-elevation in leads V1/V2 and Depression in III.  Cardiology was contacted and plans for emergent cardiac catheterization this evening.  We have been consulted for further management after cardiac catheterization in the ICU.  Patient states she does not take Plavix but still takes aspirin.  Denies missing her meds.    Procedure(s) (LRB):  Angiogram, Coronary, with Left Heart Cath (N/A)  IVUS, Coronary  Angioplasty-coronary      Hospital Course:   48-year-old  female with a past medical history significant for coronary artery disease status post stent placed 1 year ago in her LAD, hypertension, hyperlipidemia, obesity, non-insulin-dependent type 2 diabetes (on Ozempic) presents to the ER with chest pressure starting around 3:00 a.m. this afternoon while she was in orientation for her new job.  Patient denies any previous  history of chest pain in the past couple of months.  Denies any lower extremity edema.  Denies any dyspnea on exertion no shortness a breath.  Denies any headaches nausea or vomiting.  In the ER was found to have a STEMI with ST-elevation in leads V1/V2 and Depression in III.  Cardiology was contacted and plans for emergent cardiac catheterization ,then ICU admission, Patient states she does not take Plavix but still takes aspirin.  S/P [facility] ,    Patient came in with anterior STEMI caused by InStent thrombosis of previously placed LAD stent.  Balloon angioplasty of the stent was performed with excellent angiographic results.  IVUS post PCI revealed well-expanded and apposed stent without any proximal or distal dissections.  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  No new stents were placed and we were able to restore GOLDIE 3 flow with balloon angioplasty    There was single vessel coronary artery disease.    The Prox LAD lesion was 100% stenosed with 0% stenosis post-intervention.  Cardiology started on Brillanta,send to pharmacy,SW arranging for coupon.  Patient was closely monitored in ICU and remains stable,  Patient was discharged home with ACS medications and follow up with PCP and cardiology as out patient.       Goals of Care Treatment Preferences:  Code Status: Full Code      Consults:   Consults (From admission, onward)          Status Ordering Provider     Inpatient consult to Cardiology  Once        Provider:  Zaire Carroll MD    Completed JB CAVAZOS            No new Assessment & Plan notes have been filed under this hospital service since the last note was generated.  Service: Hospital Medicine    Final Active Diagnoses:    Diagnosis Date Noted POA    PRINCIPAL PROBLEM:  STEMI (ST elevation myocardial infarction) [I21.3] 05/23/2023 Yes    Essential hypertension [I10] 05/24/2024 Yes    Type 2 diabetes mellitus with circulatory disorder, without long-term current use of insulin [E11.59]  05/23/2023 Yes    Hyperlipidemia [E78.5] 05/23/2023 Yes    Class 1 obesity in adult [E66.9] 05/23/2023 Yes      Problems Resolved During this Admission:       Discharged Condition: stable    Disposition: Home or Self Care    Follow Up:   Follow-up Information       Unable, To Obtain Follow up in 1 week(s).                           Patient Instructions:      Ambulatory referral/consult to Cardiology   Standing Status: Future   Referral Priority: Routine Referral Type: Consultation   Referral Reason: Specialty Services Required   Requested Specialty: Cardiology   Number of Visits Requested: 1     Activity as tolerated       Significant Diagnostic Studies: Labs: BMP:   Recent Labs   Lab 05/24/24 1947 05/25/24 0434   * 306*    138   K 4.3 4.1    107   CO2 21* 22*   BUN 9 9   CREATININE 0.9 0.8   CALCIUM 9.6 9.2   MG 2.1  --    , CMP   Recent Labs   Lab 05/24/24 1947 05/25/24 0434    138   K 4.3 4.1    107   CO2 21* 22*   * 306*   BUN 9 9   CREATININE 0.9 0.8   CALCIUM 9.6 9.2   PROT 7.8  --    ALBUMIN 3.9  --    BILITOT 0.6  --    ALKPHOS 130  --    AST 36  --    ALT 37  --    ANIONGAP 12 9   , CBC   Recent Labs   Lab 05/24/24 1947 05/25/24 0434   WBC 12.69 13.52*   HGB 15.6 14.3   HCT 44.0 42.5    299   , Lipid Panel   Lab Results   Component Value Date    CHOL 172 05/25/2024    HDL 30 (L) 05/25/2024    LDLCALC 123.0 05/25/2024    TRIG 95 05/25/2024    CHOLHDL 17.4 (L) 05/25/2024   , and Troponin   Recent Labs   Lab 05/24/24 1947 05/24/24 2244 05/25/24 0434   TROPONINI 0.205* 1.472* 7.776*     Radiology: X-Ray: CXR: X-Ray Chest 1 View (CXR): No results found for this visit on 05/24/24. and X-Ray Chest PA and Lateral (CXR): No results found for this visit on 05/24/24.  Cardiac Graphics: Echocardiogram: 2D echo with color flow doppler: No results found for this or any previous visit. and Transthoracic echo (TTE) complete (Cupid Only):   Results for orders placed or  performed during the hospital encounter of 05/24/24   Echo   Result Value Ref Range    BSA 2.09 m2    LVOT stroke volume 53.07 cm3    LVIDd 5.21 3.5 - 6.0 cm    LV Systolic Volume 57.62 mL    LV Systolic Volume Index 28.4 mL/m2    LVIDs 3.69 2.1 - 4.0 cm    LV Diastolic Volume 130.09 mL    LV Diastolic Volume Index 64.08 mL/m2    IVS 1.01 0.6 - 1.1 cm    LVOT diameter 2.00 cm    LVOT area 3.1 cm2    FS 29 28 - 44 %    Left Ventricle Relative Wall Thickness 0.39 cm    Posterior Wall 1.02 0.6 - 1.1 cm    LV mass 198.68 g    LV Mass Index 98 g/m2    MV Peak E Roosevelt 0.58 m/s    TDI LATERAL 0.06 m/s    TDI SEPTAL 0.07 m/s    E/E' ratio 8.92 m/s    MV Peak A Roosevelt 0.83 m/s    TR Max [place] 2.30 m/s    E/A ratio 0.70     E wave deceleration time 177.53 msec    LV SEPTAL E/E' RATIO 8.29 m/s    LV LATERAL E/E' RATIO 9.67 m/s    LVOT peak roosevelt 0.89 m/s    Left Ventricular Outflow Tract Mean Velocity 0.59 cm/s    Left Ventricular Outflow Tract Mean Gradient 1.74 mmHg    RVDD 2.73 cm    RV S' 9.86 cm/s    TAPSE 1.75 cm    RV/LV Ratio 0.52 cm    LA size 3.27 cm    Left Atrium Minor Axis 4.38 cm    Left Atrium Major Axis 4.62 cm    RA Major Axis 4.41 cm    AV mean gradient 5 mmHg    AV peak gradient 8 mmHg    Ao peak roosevelt 1.38 m/s    Ao VTI 26.60 cm    LVOT peak VTI 16.90 cm    AV valve area 1.99 cm²    AV Velocity Ratio 0.64     AV index (prosthetic) 0.64     TATIANA by Velocity Ratio 2.03 cm²    MV mean gradient 1 mmHg    MV peak gradient 4 mmHg    MV stenosis pressure 1/2 time 51.48 ms    MV valve area p 1/2 method 4.27 cm2    MV valve area by continuity eq 2.79 cm2    MV VTI 19.0 cm    Triscuspid Valve Regurgitation Peak Gradient 21 mmHg    PV PEAK VELOCITY 0.91 m/s    PV peak gradient 3 mmHg    Sinus 2.77 cm    STJ 2.45 cm    Ascending aorta 2.56 cm    IVC diameter 1.66 cm    Mean e' 0.07 m/s    ZLVIDS 0.00     ZLVIDD -1.44     LA Volume Index 22.8 mL/m2    LA volume 46.25 cm3    LA WIDTH 3.7 cm    RA Width 3.0 cm    TV resting pulmonary  artery pressure 24 mmHg    RV TB RVSP 5 mmHg    Est. RA pres 3 mmHg    Narrative      Left Ventricle: There is eccentric hypertrophy. Regional wall motion   abnormalities present. There is severely reduced systolic function with a   visually estimated ejection fraction of 25 - 30%. Grade I diastolic   dysfunction. steph septal and apical hypokinesis    Right Ventricle: Normal right ventricular cavity size. Systolic   function is normal.    Left Atrium: Left atrium is mildly dilated.    Aortic Valve: There is mild stenosis. Aortic valve area by VTI is 1.99   cm². Aortic valve peak velocity is 1.38 m/s. Mean gradient is 5 mmHg. The   dimensionless index is 0.64.    Pulmonary Artery: The estimated pulmonary artery systolic pressure is   24 mmHg.    IVC/SVC: Normal venous pressure at 3 mmHg.       Angiography: [facility]     Pending Diagnostic Studies:       Procedure Component Value Units Date/Time    EKG 12-LEAD on arrival to floor [[id number]]     Order Status: Sent Lab Status: No result            Medications:  Reconciled Home Medications:      Medication List        START taking these medications      ticagrelor 90 mg tablet  Commonly known as: BRILINTA  Take 1 tablet (90 mg total) by mouth 2 (two) times daily.  Start taking on: August 24, 2024            CONTINUE taking these medications      aspirin 81 MG EC tablet  Commonly known as: ECOTRIN  Take 1 tablet (81 mg total) by mouth once daily.     atorvastatin 80 MG tablet  Commonly known as: LIPITOR  Take 1 tablet (80 mg total) by mouth every evening.     losartan 25 MG tablet  Commonly known as: COZAAR  Take 1 tablet (25 mg total) by mouth once daily.     metoprolol succinate 25 MG 24 hr tablet  Commonly known as: TOPROL-XL  Take 1 tablet (25 mg total) by mouth once daily.     nitroGLYCERIN 0.4 MG SL tablet  Commonly known as: NITROSTAT  Place 1 tablet (0.4 mg total) under the tongue every 5 (five) minutes as needed for Chest pain.     OZEMPIC 0.25 mg or 0.5 mg (2 mg/3  mL) pen injector  Generic drug: semaglutide  Inject 0.5 mg into the skin every 7 days.            STOP taking these medications      clopidogreL 75 mg tablet  Commonly known as: PLAVIX              Indwelling Lines/Drains at time of discharge:   Lines/Drains/Airways       None                   Time spent on the discharge of patient:  over 45  minutes    Critical care time spent on the evaluation and treatment of severe organ dysfunction, review of pertinent labs and imaging studies, discussions with consulting providers and discussions with patient/family: over 45  minutes.     Madeline Prescott MD  Department of Hospital Medicine  [facility] - Intensive Care

## 2024-05-26 NOTE — PLAN OF CARE
Neuro: AAO X 4    Cardiac: SR    Respiratory: RA    GI: Cardiac     : Up to toilet    Lines: PIV x 2    GTT: None    Deny chest pain. Bed locked, bed in lowest position, and call light placed near pt. Free from falls and injuries. Care explained.

## 2024-05-26 NOTE — NURSING
Discharged home via personal car,   to car via w/c with transport.    All belongings with patient, clothes,cell phone and

## 2024-05-27 LAB
OHS QRS DURATION: 80 MS
OHS QRS DURATION: 86 MS
OHS QRS DURATION: 88 MS
OHS QRS DURATION: 88 MS
OHS QTC CALCULATION: 432 MS
OHS QTC CALCULATION: 457 MS
OHS QTC CALCULATION: 458 MS
OHS QTC CALCULATION: 471 MS

## 2024-06-13 ENCOUNTER — OFFICE VISIT (OUTPATIENT)
Dept: CARDIOLOGY | Facility: CLINIC | Age: 49
End: 2024-06-13

## 2024-06-13 VITALS
BODY MASS INDEX: 32.26 KG/M2 | OXYGEN SATURATION: 96 % | DIASTOLIC BLOOD PRESSURE: 72 MMHG | WEIGHT: 200.75 LBS | SYSTOLIC BLOOD PRESSURE: 126 MMHG | RESPIRATION RATE: 15 BRPM | HEIGHT: 66 IN | HEART RATE: 94 BPM

## 2024-06-13 DIAGNOSIS — I50.9 CONGESTIVE HEART FAILURE, UNSPECIFIED HF CHRONICITY, UNSPECIFIED HEART FAILURE TYPE: Primary | ICD-10-CM

## 2024-06-13 DIAGNOSIS — I50.42 CHRONIC COMBINED SYSTOLIC (CONGESTIVE) AND DIASTOLIC (CONGESTIVE) HEART FAILURE: ICD-10-CM

## 2024-06-13 DIAGNOSIS — I21.3 STEMI (ST ELEVATION MYOCARDIAL INFARCTION): ICD-10-CM

## 2024-06-13 DIAGNOSIS — E66.01 MORBID OBESITY: ICD-10-CM

## 2024-06-13 PROCEDURE — 99214 OFFICE O/P EST MOD 30 MIN: CPT | Mod: PBBFAC | Performed by: INTERNAL MEDICINE

## 2024-06-13 PROCEDURE — 99214 OFFICE O/P EST MOD 30 MIN: CPT | Mod: S$PBB,,, | Performed by: INTERNAL MEDICINE

## 2024-06-13 PROCEDURE — 99999 PR PBB SHADOW E&M-EST. PATIENT-LVL IV: CPT | Mod: PBBFAC,,, | Performed by: INTERNAL MEDICINE

## 2024-06-13 RX ORDER — SACUBITRIL AND VALSARTAN 24; 26 MG/1; MG/1
1 TABLET, FILM COATED ORAL 2 TIMES DAILY
Qty: 180 TABLET | Refills: 3 | Status: SHIPPED | OUTPATIENT
Start: 2024-06-13

## 2024-06-13 NOTE — PROGRESS NOTES
CARDIOVASCULAR CONSULTATION    REASON FOR CONSULT:   Eusebia Del Valle is a 48 y.o. female who presents for   Chief Complaint   Patient presents with    Follow-up          HISTORY OF PRESENT ILLNESS:     Patient is a pleasant 48-year-old lady.  Recently came in with anterior STEMI.  PCI done.  Used to follow with [facility] now wants to shift to us.  Denies orthopnea PND.  No more chest pain since the PCI    Results for orders placed or performed during the hospital encounter of 05/24/24   EKG 12-lead    Collection Time: 05/24/24 10:40 PM   Result Value Ref Range    QRS Duration 88 ms    [facility] QTC Calculation 471 ms    Narrative    Test Reason : I95.9,    Vent. Rate : 078 BPM     Atrial Rate : 078 BPM     P-R Int : 148 ms          QRS Dur : 088 ms      QT Int : 414 ms       P-R-T Axes : 070 263 059 degrees     QTc Int : 471 ms    Normal sinus rhythm  Right superior axis deviation  Right ventricular hypertrophy  Septal infarct (cited on or before 23-MAY-2023)  Inferior infarct ,age undetermined  Abnormal ECG  When compared with ECG of 24-MAY-2024 21:05,  Significant changes have occurred  Confirmed by Carly ALFARO, Zaire SCHREIBER ([id number]) on 5/27/2024 9:15:17 PM    Referred By: [email or web address]   SELF           Confirmed By:Zaire Carroll MD          ECHO    Results for orders placed during the hospital encounter of 05/24/24    Echo    Interpretation Summary    Left Ventricle: There is eccentric hypertrophy. Regional wall motion abnormalities present. There is severely reduced systolic function with a visually estimated ejection fraction of 25 - 30%. Grade I diastolic dysfunction. steph septal and apical hypokinesis    Right Ventricle: Normal right ventricular cavity size. Systolic function is normal.    Left Atrium: Left atrium is mildly dilated.    Aortic Valve: There is mild stenosis. Aortic valve area by VTI is 1.99 cm². Aortic valve peak velocity is 1.38 m/s. Mean gradient is 5 mmHg. The dimensionless index is 0.64.     Pulmonary Artery: The estimated pulmonary artery systolic pressure is 24 mmHg.    IVC/SVC: Normal venous pressure at 3 mmHg.      STRESS TEST    No results found for this or any previous visit.        CATH    Results for orders placed during the hospital encounter of 05/24/24    Cardiac catheterization    Conclusion    Patient came in with anterior STEMI caused by InStent thrombosis of previously placed LAD stent.  Balloon angioplasty of the stent was performed with excellent angiographic results.  IVUS post PCI revealed well-expanded and apposed stent without any proximal or distal dissections.  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  No new stents were placed and we were able to restore GOLDIE 3 flow with balloon angioplasty    There was single vessel coronary artery disease.    The Prox LAD lesion was 100% stenosed with 0% stenosis post-intervention.    The post-procedure left ventricular end diastolic pressure was 32.    The estimated blood loss was <50 mL.    Procedures performed:    Coronary angiography  2.  Balloon angioplasty of proximal LAD    3. IVUS of LAD    Coronary angiography:    Left main: No significant stenosis    Lad:  InStent thrombosis with 100% occluded stent with GOLDIE 0 flow on presentation.  GOLDIE 3 flow post PCI.  IVUS guidance used for this  balloon angioplasty    Circumflex: Luminal irregularities    RCA:  Luminal irregularities    Access: Right radial artery access    Assessment plan    Importance of compliance with dual antiplatelet therapy stress to the patient.  Importance of risk factor modification stress to the patient    Aspirin 81 mg daily indefinitely    Brilinta 90 mg b.I.d. for at least 1 year and dual antiplatelet therapy for longer than 1 year if no contraindications    Statins with goal LDL less than 70    Weight loss and aggressive risk factor modification      The procedure log was documented by Documenter: Marla Harp RVT and verified by Zaire Carroll  MD.    Date: 5/24/2024  Time: 10:25 PM      PAST MEDICAL HISTORY:     Past Medical History:   Diagnosis Date    Diabetes mellitus        PAST SURGICAL HISTORY:     Past Surgical History:   Procedure Laterality Date    ANGIOGRAM, CORONARY, WITH LEFT HEART CATHETERIZATION N/A 5/23/2023    Procedure: Angiogram, Coronary, with Left Heart Cath;  Surgeon: Jass Gibbs MD;  Location: [facility] CATH LAB;  Service: Cardiology;  Laterality: N/A;    ANGIOGRAM, CORONARY, WITH LEFT HEART CATHETERIZATION N/A 5/24/2024    Procedure: Angiogram, Coronary, with Left Heart Cath;  Surgeon: Zaire Carroll MD;  Location: [facility] CATH LAB;  Service: Cardiology;  Laterality: N/A;    IVUS, CORONARY  5/23/2023    Procedure: IVUS, Coronary;  Surgeon: Jass Gibbs MD;  Location: [facility] CATH LAB;  Service: Cardiology;;    IVUS, CORONARY  5/24/2024    Procedure: IVUS, Coronary;  Surgeon: Zaire Carroll MD;  Location: [facility] CATH LAB;  Service: Cardiology;;    PERCUTANEOUS TRANSLUMINAL BALLOON ANGIOPLASTY OF CORONARY ARTERY  5/24/2024    Procedure: Angioplasty-coronary;  Surgeon: Zaire Carroll MD;  Location: [facility] CATH LAB;  Service: Cardiology;;    PTCA, SINGLE VESSEL  5/23/2023    Procedure: PTCA, Single Vessel;  Surgeon: Jass Gibbs MD;  Location: [facility] CATH LAB;  Service: Cardiology;;    STENT, DRUG ELUTING, SINGLE VESSEL, CORONARY  5/23/2023    Procedure: Stent, Drug Eluting, Single Vessel, Coronary;  Surgeon: Jass Gibbs MD;  Location: [facility] CATH LAB;  Service: Cardiology;;           SOCIAL HISTORY:     Social History     Socioeconomic History    Marital status: Single   Tobacco Use    Smoking status: Never    Smokeless tobacco: Never   Substance and Sexual Activity    Alcohol use: No    Drug use: No    Sexual activity: Not Currently     Birth control/protection: None     Social Determinants of Health     Financial Resource Strain: Medium Risk (6/6/2024)    Overall Financial Resource Strain (CARDIA)     Difficulty of  "Paying Living Expenses: Somewhat hard   Food Insecurity: No Food Insecurity (6/6/2024)    Hunger Vital Sign     Worried About Running Out of Food in the Last Year: Never true     Ran Out of Food in the Last Year: Never true   Transportation Needs: No Transportation Needs (5/23/2023)    PRAPARE - Transportation     Lack of Transportation (Medical): No     Lack of Transportation (Non-Medical): No   Physical Activity: Unknown (6/6/2024)    Exercise Vital Sign     Days of Exercise per Week: 2 days   Stress: Stress Concern Present (6/6/2024)    [facility] [employer] - Occupational Stress Questionnaire     Feeling of Stress : Very much   Housing Stability: Low Risk  (5/23/2023)    Housing Stability Vital Sign     Unable to Pay for Housing in the Last Year: No     Number of Places Lived in the Last Year: 1     Unstable Housing in the Last Year: No       FAMILY HISTORY:   No family history on file.    REVIEW OF SYSTEMS:   Review of Systems   Constitutional: Negative.   HENT: Negative.     Eyes: Negative.    Cardiovascular: Negative.    Respiratory: Negative.     Endocrine: Negative.    Hematologic/Lymphatic: Negative.    Skin: Negative.    Musculoskeletal: Negative.    Gastrointestinal: Negative.    Genitourinary: Negative.    Neurological: Negative.    Psychiatric/Behavioral: Negative.     Allergic/Immunologic: Negative.        A 10 point review of systems was performed and all the pertinent positives have been mentioned. Rest of review of systems was negative.        PHYSICAL EXAM:     Vitals:    06/13/24 1033   BP: 126/72   Pulse: 94   Resp: 15    Body mass index is 32.4 kg/m².  Weight: 91 kg (200 lb 11.7 oz)   Height: 5' 6" (167.6 cm)     Physical Exam  Constitutional:       Appearance: Normal appearance. She is well-developed.   HENT:      Head: Normocephalic.   Eyes:      Pupils: Pupils are equal, round, and reactive to light.   Cardiovascular:      Rate and Rhythm: Normal rate and regular rhythm. "   Pulmonary:      Effort: Pulmonary effort is normal.      Breath sounds: Normal breath sounds.   Abdominal:      General: Bowel sounds are normal.      Palpations: Abdomen is soft.      Tenderness: There is no abdominal tenderness.   Musculoskeletal:         General: Normal range of motion.      Cervical back: Normal range of motion and neck supple.   Skin:     General: Skin is warm.   Neurological:      Mental Status: She is alert and oriented to person, place, and time.           DATA:     Laboratory:  CBC:  Recent Labs   Lab 05/14/24 2026 05/24/24 1947 05/25/24  0434   WBC 11.46 12.69 13.52 H   Hemoglobin 14.5 15.6 14.3   Hematocrit 42.0 44.0 42.5   Platelets 307 300 299       CHEMISTRIES:  Recent Labs   Lab 05/14/24 2026 05/24/24 1947 05/25/24  0434   Glucose 301 H 322 H 306 H   Sodium 137 137 138   Potassium 4.5 4.3 4.1   BUN 12 9 9   Creatinine 1.0 0.9 0.8   Calcium 10.1 9.6 9.2   Magnesium  --  2.1  --        CARDIAC BIOMARKERS:  Recent Labs   Lab 05/24/24 1947 05/24/24 2244 05/25/24  0434   Troponin I 0.205 H 1.472 H 7.776 H       COAGS:  Recent Labs   Lab 05/23/23  0112   INR 1.0       LIPIDS/LFTS:  Recent Labs   Lab 05/23/23  0112 05/14/24 2026 05/24/24 1947 05/25/24  0434   Cholesterol 215 H  --   --  172   Triglycerides 160 H  --   --  95   HDL 35 L  --   --  30 L   LDL Cholesterol 148.0  --   --  123.0   Non-HDL Cholesterol 180  --   --  142   AST 33 23 36  --    ALT 39 29 37  --        Hemoglobin A1C   Date Value Ref Range Status   05/25/2024 9.2 (H) 4.0 - 5.6 % Final     Comment:     ADA Screening Guidelines:  5.7-6.4%  Consistent with prediabetes  >or=6.5%  Consistent with diabetes    High levels of fetal hemoglobin interfere with the HbA1C  assay. Heterozygous hemoglobin variants (HbS, HgC, etc)do  not significantly interfere with this assay.   However, presence of multiple variants may affect accuracy.     05/23/2023 9.4 (H) 4.0 - 5.6 % Final     Comment:     ADA Screening  Guidelines:  5.7-6.4%  Consistent with prediabetes  >or=6.5%  Consistent with diabetes    High levels of fetal hemoglobin interfere with the HbA1C  assay. Heterozygous hemoglobin variants (HbS, HgC, etc)do  not significantly interfere with this assay.   However, presence of multiple variants may affect accuracy.         TSH  Recent Labs   Lab 05/25/24  0434   TSH 0.863       The ASCVD Risk score (Claire WHEATLEY, et al., 2019) failed to calculate for the following reasons:    The patient has a prior MI or stroke diagnosis       BNP    Lab Results   Component Value Date/Time    BNP 17 05/14/2024 08:26 PM    BNP 22 05/23/2023 01:12 AM         ASSESSMENT AND PLAN     Patient Active Problem List   Diagnosis    Cough    STEMI (ST elevation myocardial infarction)    Type 2 diabetes mellitus with circulatory disorder, without long-term current use of insulin    Hyperlipidemia    Class 1 obesity in adult    Essential hypertension         ALLERGIES AND MEDICATION:   Review of patient's allergies indicates:  No Known Allergies     Medication List            Accurate as of June 13, 2024 11:22 AM. If you have any questions, ask your nurse or doctor.                START taking these medications      empagliflozin 10 mg tablet  Commonly known as: JARDIANCE  Take 1 tablet (10 mg total) by mouth once daily.  Started by: Zaire Carroll MD     ENTRESTO 24-26 mg per tablet  Generic drug: sacubitriL-valsartan  Take 1 tablet by mouth 2 (two) times daily.  Started by: Zaire Carroll MD            CONTINUE taking these medications      aspirin 81 MG EC tablet  Commonly known as: ECOTRIN  Take 1 tablet (81 mg total) by mouth once daily.     atorvastatin 80 MG tablet  Commonly known as: LIPITOR  Take 1 tablet (80 mg total) by mouth every evening.     metoprolol succinate 25 MG 24 hr tablet  Commonly known as: TOPROL-XL  Take 1 tablet (25 mg total) by mouth once daily.     nitroGLYCERIN 0.4 MG SL tablet  Commonly known as: NITROSTAT  Place 1  tablet (0.4 mg total) under the tongue every 5 (five) minutes as needed for Chest pain.     OZEMPIC 0.25 mg or 0.5 mg (2 mg/3 mL) pen injector  Generic drug: semaglutide     ticagrelor 90 mg tablet  Commonly known as: BRILINTA  Take 1 tablet (90 mg total) by mouth 2 (two) times daily.  Start taking on: August 24, 2024            STOP taking these medications      losartan 25 MG tablet  Commonly known as: COZAAR  Stopped by: Zaire Carroll MD               Where to Get Your Medications        These medications were sent to [id number] - [place], [place] - [address]  [address], [facility] [address]      Hours: 24-hours Phone: [phone]   empagliflozin 10 mg tablet  ENTRESTO 24-26 mg per tablet         Orders Placed This Encounter   Procedures    Basic Metabolic Panel       Coronary artery disease status post PCI.  Continue aspirin and Brilinta.  Continue Brilinta uninterrupted for at least 1 year and longer if no contraindications.  Aspirin indefinitely.  Statins with goal LDL less than 70     Weight loss     Ischemic cardiomyopathy:  Switch from losartan to Entresto.  Add Jardiance.  Follow-up in 1 month for further titration of heart failure therapy.  Check BMP in 1 week.  Continue long-acting beta blocker    Lab Results   Component Value Date    LDLCALC 123.0 05/25/2024           Thank you very much for involving me in the care of your patient.  Please do not hesitate to contact me if there are any questions.      Zaire Carroll MD, [facility], [facility]  Interventional Cardiologist, [facility].           This note was dictated with the help of speech recognition software.  There might be un-intended errors and/or substitutions.

## 2024-07-16 DIAGNOSIS — Z12.31 ENCOUNTER FOR SCREENING MAMMOGRAM FOR MALIGNANT NEOPLASM OF BREAST: Primary | ICD-10-CM

## 2024-08-26 PROBLEM — I21.3 STEMI (ST ELEVATION MYOCARDIAL INFARCTION): Status: RESOLVED | Noted: 2023-05-23 | Resolved: 2024-08-26

## 2024-09-30 ENCOUNTER — OFFICE VISIT (OUTPATIENT)
Dept: CARDIOLOGY | Facility: CLINIC | Age: 49
End: 2024-09-30
Payer: MEDICAID

## 2024-09-30 VITALS
HEART RATE: 97 BPM | BODY MASS INDEX: 32.13 KG/M2 | SYSTOLIC BLOOD PRESSURE: 122 MMHG | OXYGEN SATURATION: 97 % | HEIGHT: 66 IN | DIASTOLIC BLOOD PRESSURE: 80 MMHG | RESPIRATION RATE: 15 BRPM | WEIGHT: 199.94 LBS

## 2024-09-30 DIAGNOSIS — I50.42 CHRONIC COMBINED SYSTOLIC (CONGESTIVE) AND DIASTOLIC (CONGESTIVE) HEART FAILURE: ICD-10-CM

## 2024-09-30 DIAGNOSIS — E66.01 MORBID OBESITY: ICD-10-CM

## 2024-09-30 DIAGNOSIS — I50.9 CONGESTIVE HEART FAILURE, UNSPECIFIED HF CHRONICITY, UNSPECIFIED HEART FAILURE TYPE: Primary | ICD-10-CM

## 2024-09-30 PROCEDURE — 93010 ELECTROCARDIOGRAM REPORT: CPT | Mod: S$PBB,,, | Performed by: INTERNAL MEDICINE

## 2024-09-30 PROCEDURE — 3008F BODY MASS INDEX DOCD: CPT | Mod: CPTII,,, | Performed by: INTERNAL MEDICINE

## 2024-09-30 PROCEDURE — 1159F MED LIST DOCD IN RCRD: CPT | Mod: CPTII,,, | Performed by: INTERNAL MEDICINE

## 2024-09-30 PROCEDURE — 93005 ELECTROCARDIOGRAM TRACING: CPT | Mod: PBBFAC | Performed by: INTERNAL MEDICINE

## 2024-09-30 PROCEDURE — 99214 OFFICE O/P EST MOD 30 MIN: CPT | Mod: S$PBB,,, | Performed by: INTERNAL MEDICINE

## 2024-09-30 PROCEDURE — 4010F ACE/ARB THERAPY RXD/TAKEN: CPT | Mod: CPTII,,, | Performed by: INTERNAL MEDICINE

## 2024-09-30 PROCEDURE — 3074F SYST BP LT 130 MM HG: CPT | Mod: CPTII,,, | Performed by: INTERNAL MEDICINE

## 2024-09-30 PROCEDURE — 99213 OFFICE O/P EST LOW 20 MIN: CPT | Mod: PBBFAC | Performed by: INTERNAL MEDICINE

## 2024-09-30 PROCEDURE — 99999 PR PBB SHADOW E&M-EST. PATIENT-LVL III: CPT | Mod: PBBFAC,,, | Performed by: INTERNAL MEDICINE

## 2024-09-30 PROCEDURE — 3079F DIAST BP 80-89 MM HG: CPT | Mod: CPTII,,, | Performed by: INTERNAL MEDICINE

## 2024-09-30 PROCEDURE — 3046F HEMOGLOBIN A1C LEVEL >9.0%: CPT | Mod: CPTII,,, | Performed by: INTERNAL MEDICINE

## 2024-09-30 PROCEDURE — 1160F RVW MEDS BY RX/DR IN RCRD: CPT | Mod: CPTII,,, | Performed by: INTERNAL MEDICINE

## 2024-09-30 RX ORDER — NAPROXEN SODIUM 220 MG/1
81 TABLET, FILM COATED ORAL DAILY
Qty: 90 TABLET | Refills: 3 | Status: SHIPPED | OUTPATIENT
Start: 2024-09-30 | End: 2025-09-30

## 2024-09-30 RX ORDER — METOPROLOL SUCCINATE 25 MG/1
25 TABLET, EXTENDED RELEASE ORAL DAILY
Qty: 90 TABLET | Refills: 3 | Status: SHIPPED | OUTPATIENT
Start: 2024-09-30 | End: 2025-09-30

## 2024-09-30 RX ORDER — ATORVASTATIN CALCIUM 80 MG/1
80 TABLET, FILM COATED ORAL NIGHTLY
Qty: 90 TABLET | Refills: 3 | Status: SHIPPED | OUTPATIENT
Start: 2024-09-30 | End: 2025-09-30

## 2024-09-30 RX ORDER — SACUBITRIL AND VALSARTAN 24; 26 MG/1; MG/1
1 TABLET, FILM COATED ORAL 2 TIMES DAILY
Qty: 180 TABLET | Refills: 3 | Status: SHIPPED | OUTPATIENT
Start: 2024-09-30

## 2024-09-30 RX ORDER — NITROGLYCERIN 0.4 MG/1
0.4 TABLET SUBLINGUAL EVERY 5 MIN PRN
Qty: 50 TABLET | Refills: 3 | Status: SHIPPED | OUTPATIENT
Start: 2024-09-30 | End: 2025-09-30

## 2024-09-30 NOTE — PROGRESS NOTES
CARDIOVASCULAR CONSULTATION    REASON FOR CONSULT:   Eusebia Del Valle is a 49 y.o. female who presents for   Chief Complaint   Patient presents with    Follow-up          HISTORY OF PRESENT ILLNESS:     Patient is a pleasant 48-year-old lady.  Recently came in with anterior STEMI.  PCI done.  Used to follow with [facility] now wants to shift to us.  Denies orthopnea PND.  No more chest pain since the PCI    Results for orders placed or performed during the hospital encounter of 05/24/24   EKG 12-lead    Collection Time: 05/24/24 10:40 PM   Result Value Ref Range    QRS Duration 88 ms    [facility] QTC Calculation 471 ms    Narrative    Test Reason : I95.9,    Vent. Rate : 078 BPM     Atrial Rate : 078 BPM     P-R Int : 148 ms          QRS Dur : 088 ms      QT Int : 414 ms       P-R-T Axes : 070 263 059 degrees     QTc Int : 471 ms    Normal sinus rhythm  Right superior axis deviation  Right ventricular hypertrophy  Septal infarct (cited on or before 23-MAY-2023)  Inferior infarct ,age undetermined  Abnormal ECG  When compared with ECG of 24-MAY-2024 21:05,  Significant changes have occurred  Confirmed by Carly ALFARO, Zaire SCHREIBER ([id number]) on 5/27/2024 9:15:17 PM    Referred By: [email or web address]   SELF           Confirmed By:Zaire Carroll MD          ECHO    Results for orders placed during the hospital encounter of 05/24/24    Echo    Interpretation Summary    Left Ventricle: There is eccentric hypertrophy. Regional wall motion abnormalities present. There is severely reduced systolic function with a visually estimated ejection fraction of 25 - 30%. Grade I diastolic dysfunction. steph septal and apical hypokinesis    Right Ventricle: Normal right ventricular cavity size. Systolic function is normal.    Left Atrium: Left atrium is mildly dilated.    Aortic Valve: There is mild stenosis. Aortic valve area by VTI is 1.99 cm². Aortic valve peak velocity is 1.38 m/s. Mean gradient is 5 mmHg. The dimensionless index is 0.64.     Pulmonary Artery: The estimated pulmonary artery systolic pressure is 24 mmHg.    IVC/SVC: Normal venous pressure at 3 mmHg.      STRESS TEST    No results found for this or any previous visit.        CATH    Results for orders placed during the hospital encounter of 05/24/24    Cardiac catheterization    Conclusion    Patient came in with anterior STEMI caused by InStent thrombosis of previously placed LAD stent.  Balloon angioplasty of the stent was performed with excellent angiographic results.  IVUS post PCI revealed well-expanded and apposed stent without any proximal or distal dissections.  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  No new stents were placed and we were able to restore GOLDIE 3 flow with balloon angioplasty    There was single vessel coronary artery disease.    The Prox LAD lesion was 100% stenosed with 0% stenosis post-intervention.    The post-procedure left ventricular end diastolic pressure was 32.    The estimated blood loss was <50 mL.    Procedures performed:    Coronary angiography  2.  Balloon angioplasty of proximal LAD    3. IVUS of LAD    Coronary angiography:    Left main: No significant stenosis    Lad:  InStent thrombosis with 100% occluded stent with GOLDIE 0 flow on presentation.  GOLDIE 3 flow post PCI.  IVUS guidance used for this  balloon angioplasty    Circumflex: Luminal irregularities    RCA:  Luminal irregularities    Access: Right radial artery access    Assessment plan    Importance of compliance with dual antiplatelet therapy stress to the patient.  Importance of risk factor modification stress to the patient    Aspirin 81 mg daily indefinitely    Brilinta 90 mg b.I.d. for at least 1 year and dual antiplatelet therapy for longer than 1 year if no contraindications    Statins with goal LDL less than 70    Weight loss and aggressive risk factor modification      The procedure log was documented by Documenter: Marla Harp RVT and verified by Zaire Carroll  MD.    Date: 5/24/2024  Time: 10:25 PM      Notes from September 24: Patient here for follow-up.  Has been noncompliant with the heart failure medications.  States has been working too much.  Otherwise denies chest pains at rest on exertion orthopnea or PND    PAST MEDICAL HISTORY:     Past Medical History:   Diagnosis Date    Diabetes mellitus        PAST SURGICAL HISTORY:     Past Surgical History:   Procedure Laterality Date    ANGIOGRAM, CORONARY, WITH LEFT HEART CATHETERIZATION N/A 5/23/2023    Procedure: Angiogram, Coronary, with Left Heart Cath;  Surgeon: Jass Gibbs MD;  Location: [facility] CATH LAB;  Service: Cardiology;  Laterality: N/A;    ANGIOGRAM, CORONARY, WITH LEFT HEART CATHETERIZATION N/A 5/24/2024    Procedure: Angiogram, Coronary, with Left Heart Cath;  Surgeon: Zaire Carroll MD;  Location: [facility] CATH LAB;  Service: Cardiology;  Laterality: N/A;    IVUS, CORONARY  5/23/2023    Procedure: IVUS, Coronary;  Surgeon: Jass Gibbs MD;  Location: [facility] CATH LAB;  Service: Cardiology;;    IVUS, CORONARY  5/24/2024    Procedure: IVUS, Coronary;  Surgeon: Zaire Carroll MD;  Location: [facility] CATH LAB;  Service: Cardiology;;    PERCUTANEOUS TRANSLUMINAL BALLOON ANGIOPLASTY OF CORONARY ARTERY  5/24/2024    Procedure: Angioplasty-coronary;  Surgeon: Zaire Carroll MD;  Location: [facility] CATH LAB;  Service: Cardiology;;    PTCA, SINGLE VESSEL  5/23/2023    Procedure: PTCA, Single Vessel;  Surgeon: Jass Gibbs MD;  Location: [facility] CATH LAB;  Service: Cardiology;;    STENT, DRUG ELUTING, SINGLE VESSEL, CORONARY  5/23/2023    Procedure: Stent, Drug Eluting, Single Vessel, Coronary;  Surgeon: Jass Gibbs MD;  Location: [facility] CATH LAB;  Service: Cardiology;;           SOCIAL HISTORY:     Social History     Socioeconomic History    Marital status: Single   Tobacco Use    Smoking status: Never    Smokeless tobacco: Never   Substance and Sexual Activity    Alcohol use: No    Drug use: No     "Sexual activity: Not Currently     Birth control/protection: None     Social Drivers of Health     Financial Resource Strain: Medium Risk (6/6/2024)    Overall Financial Resource Strain (CARDIA)     Difficulty of Paying Living Expenses: Somewhat hard   Food Insecurity: No Food Insecurity (6/6/2024)    Hunger Vital Sign     Worried About Running Out of Food in the Last Year: Never true     Ran Out of Food in the Last Year: Never true   Transportation Needs: No Transportation Needs (5/23/2023)    PRAPARE - Transportation     Lack of Transportation (Medical): No     Lack of Transportation (Non-Medical): No   Physical Activity: Unknown (6/6/2024)    Exercise Vital Sign     Days of Exercise per Week: 2 days   Stress: Stress Concern Present (6/6/2024)    [employer] - Occupational Stress Questionnaire     Feeling of Stress : Very much   Housing Stability: Low Risk  (5/23/2023)    Housing Stability Vital Sign     Unable to Pay for Housing in the Last Year: No     Number of Places Lived in the Last Year: 1     Unstable Housing in the Last Year: No       FAMILY HISTORY:   No family history on file.    REVIEW OF SYSTEMS:   Review of Systems   Constitutional: Negative.   HENT: Negative.     Eyes: Negative.    Cardiovascular: Negative.    Respiratory: Negative.     Endocrine: Negative.    Hematologic/Lymphatic: Negative.    Skin: Negative.    Musculoskeletal: Negative.    Gastrointestinal: Negative.    Genitourinary: Negative.    Neurological: Negative.    Psychiatric/Behavioral: Negative.     Allergic/Immunologic: Negative.        A 10 point review of systems was performed and all the pertinent positives have been mentioned. Rest of review of systems was negative.        PHYSICAL EXAM:     Vitals:    09/30/24 0847   BP: 122/80   Pulse: 97   Resp: 15    Body mass index is 32.27 kg/m².  Weight: 90.7 kg (199 lb 15.3 oz)   Height: 5' 6" (167.6 cm)     Physical Exam  Constitutional:       Appearance: " Normal appearance. She is well-developed.   HENT:      Head: Normocephalic.   Eyes:      Pupils: Pupils are equal, round, and reactive to light.   Cardiovascular:      Rate and Rhythm: Normal rate and regular rhythm.   Pulmonary:      Effort: Pulmonary effort is normal.      Breath sounds: Normal breath sounds.   Abdominal:      General: Bowel sounds are normal.      Palpations: Abdomen is soft.      Tenderness: There is no abdominal tenderness.   Musculoskeletal:         General: Normal range of motion.      Cervical back: Normal range of motion and neck supple.   Skin:     General: Skin is warm.   Neurological:      Mental Status: She is alert and oriented to person, place, and time.           DATA:     Laboratory:  CBC:  Recent Labs   Lab 05/14/24 2026 05/24/24 1947 05/25/24  0434   WBC 11.46 12.69 13.52 H   Hemoglobin 14.5 15.6 14.3   Hematocrit 42.0 44.0 42.5   Platelets 307 300 299       CHEMISTRIES:  Recent Labs   Lab 05/14/24 2026 05/24/24 1947 05/25/24  0434   Glucose 301 H 322 H 306 H   Sodium 137 137 138   Potassium 4.5 4.3 4.1   BUN 12 9 9   Creatinine 1.0 0.9 0.8   Calcium 10.1 9.6 9.2   Magnesium  --  2.1  --        CARDIAC BIOMARKERS:  Recent Labs   Lab 05/24/24 1947 05/24/24 2244 05/25/24  0434   Troponin I 0.205 H 1.472 H 7.776 H       COAGS:  Recent Labs   Lab 05/23/23  0112   INR 1.0       LIPIDS/LFTS:  Recent Labs   Lab 05/23/23 0112 05/14/24 2026 05/24/24 1947 05/25/24  0434   Cholesterol 215 H  --   --  172   Triglycerides 160 H  --   --  95   HDL 35 L  --   --  30 L   LDL Cholesterol 148.0  --   --  123.0   Non-HDL Cholesterol 180  --   --  142   AST 33 23 36  --    ALT 39 29 37  --        Hemoglobin A1C   Date Value Ref Range Status   05/25/2024 9.2 (H) 4.0 - 5.6 % Final     Comment:     ADA Screening Guidelines:  5.7-6.4%  Consistent with prediabetes  >or=6.5%  Consistent with diabetes    High levels of fetal hemoglobin interfere with the HbA1C  assay. Heterozygous hemoglobin  variants (HbS, HgC, etc)do  not significantly interfere with this assay.   However, presence of multiple variants may affect accuracy.     05/23/2023 9.4 (H) 4.0 - 5.6 % Final     Comment:     ADA Screening Guidelines:  5.7-6.4%  Consistent with prediabetes  >or=6.5%  Consistent with diabetes    High levels of fetal hemoglobin interfere with the HbA1C  assay. Heterozygous hemoglobin variants (HbS, HgC, etc)do  not significantly interfere with this assay.   However, presence of multiple variants may affect accuracy.         TSH  Recent Labs   Lab 05/25/24  0434   TSH 0.863       The ASCVD Risk score (Claire WHEATLEY, et al., 2019) failed to calculate for the following reasons:    Risk score cannot be calculated because patient has a medical history suggesting prior/existing ASCVD       BNP    Lab Results   Component Value Date/Time    BNP 17 05/14/2024 08:26 PM    BNP 22 05/23/2023 01:12 AM         ASSESSMENT AND PLAN     Patient Active Problem List   Diagnosis    Cough    Type 2 diabetes mellitus with circulatory disorder, without long-term current use of insulin    Hyperlipidemia    Class 1 obesity in adult    Essential hypertension    Congestive heart failure    Chronic combined systolic (congestive) and diastolic (congestive) heart failure    Morbid obesity         ALLERGIES AND MEDICATION:   Review of patient's allergies indicates:  No Known Allergies     Medication List            Accurate as of September 30, 2024  9:37 AM. If you have any questions, ask your nurse or doctor.                CHANGE how you take these medications      * aspirin 81 MG EC tablet  Commonly known as: ECOTRIN  Take 1 tablet (81 mg total) by mouth once daily.  What changed: Another medication with the same name was added. Make sure you understand how and when to take each.  Changed by: Zaire Carroll MD     * aspirin 81 MG Chew  Take 1 tablet (81 mg total) by mouth once daily.  What changed: You were already taking a medication with the same  name, and this prescription was added. Make sure you understand how and when to take each.  Changed by: Zaire Carroll MD     * aspirin 81 MG Chew  Take 1 tablet (81 mg total) by mouth once daily.  What changed: You were already taking a medication with the same name, and this prescription was added. Make sure you understand how and when to take each.  Changed by: Zaire Carroll MD     * ticagrelor 90 mg tablet  Commonly known as: BRILINTA  Take 1 tablet (90 mg total) by mouth 2 (two) times daily.  What changed: Another medication with the same name was added. Make sure you understand how and when to take each.  Changed by: Zaire Carroll MD     * ticagrelor 90 mg tablet  Commonly known as: BRILINTA  Take 1 tablet (90 mg total) by mouth 2 (two) times daily.  What changed: You were already taking a medication with the same name, and this prescription was added. Make sure you understand how and when to take each.  Changed by: Zaire Carroll MD           * This list has 5 medication(s) that are the same as other medications prescribed for you. Read the directions carefully, and ask your doctor or other care provider to review them with you.                CONTINUE taking these medications      atorvastatin 80 MG tablet  Commonly known as: LIPITOR  Take 1 tablet (80 mg total) by mouth every evening.     empagliflozin 10 mg tablet  Commonly known as: JARDIANCE  Take 1 tablet (10 mg total) by mouth once daily.     ENTRESTO 24-26 mg per tablet  Generic drug: sacubitriL-valsartan  Take 1 tablet by mouth 2 (two) times daily.     metoprolol succinate 25 MG 24 hr tablet  Commonly known as: TOPROL-XL  Take 1 tablet (25 mg total) by mouth once daily.     nitroGLYCERIN 0.4 MG SL tablet  Commonly known as: NITROSTAT  Place 1 tablet (0.4 mg total) under the tongue every 5 (five) minutes as needed for Chest pain.     OZEMPIC 0.25 mg or 0.5 mg (2 mg/3 mL) pen injector  Generic drug: semaglutide               Where to Get Your Medications         These medications were sent to [facility] [address] [facility] [address] [address]      Hours: Mon-Fri, 8a-5:30p Phone: [phone]   aspirin 81 MG Chew  atorvastatin 80 MG tablet  empagliflozin 10 mg tablet  ENTRESTO 24-26 mg per tablet  metoprolol succinate 25 MG 24 hr tablet  nitroGLYCERIN 0.4 MG SL tablet  ticagrelor 90 mg tablet       These medications were sent to [id number] - [place] - [address]  [address], [facility] [address]      Hours: 24-hours Phone: [phone]   aspirin 81 MG Chew         Orders Placed This Encounter   Procedures    Basic Metabolic Panel    IN OFFICE EKG 12-LEAD (to [place])    Echo       Coronary artery disease status post PCI.  Continue aspirin and Brilinta.  Continue Brilinta uninterrupted for at least 1 year and longer if no contraindications.  Aspirin indefinitely.  Statins     Weight loss     Ischemic cardiomyopathy:  Switch from losartan to Entresto.  Add Jardiance.  Follow-up in 1 month for further titration of heart failure therapy.  Check BMP in 1 week.  Continue long-acting beta blocker    Lab Results   Component Value Date    LDLCALC 123.0 05/25/2024       Patient has been compliant with the dual antiplatelet therapy, but noncompliant with her other therapy.  Importance of compliance with heart failure therapy discussed in detail with the patient.  Follow-up in 1 month with echo prior    Thank you very much for involving me in the care of your patient.  Please do not hesitate to contact me if there are any questions.      Zaire Carroll MD, FACC, [facility]  Interventional Cardiologist, [facility].           This note was dictated with the help of speech recognition software.  There might be un-intended errors and/or substitutions.

## 2024-10-01 LAB
OHS QRS DURATION: 84 MS
OHS QTC CALCULATION: 450 MS

## 2024-10-09 ENCOUNTER — HOSPITAL ENCOUNTER (OUTPATIENT)
Dept: CARDIOLOGY | Facility: HOSPITAL | Age: 49
Discharge: HOME | End: 2024-10-09
Attending: INTERNAL MEDICINE
Payer: MEDICAID

## 2024-10-09 DIAGNOSIS — I50.9 CONGESTIVE HEART FAILURE, UNSPECIFIED HF CHRONICITY, UNSPECIFIED HEART FAILURE TYPE: ICD-10-CM

## 2024-10-09 LAB
APICAL TWO CHAMBER EJECTION FRACTION: 64 %
ASCENDING AORTA: 2.81 CM
AV INDEX (PROSTH): 0.68
AV MEAN GRADIENT: 5.9 MMHG
AV PEAK GRADIENT: 10.2 MMHG
AV VALVE AREA BY VELOCITY RATIO: 2 CM²
AV VALVE AREA: 2.1 CM²
AV VELOCITY RATIO: 0.63
CV ECHO LV RWT: 0.48 CM
DOP CALC AO PEAK VEL: 1.6 M/S
DOP CALC AO VTI: 33 CM
DOP CALC LVOT AREA: 3.1 CM2
DOP CALC LVOT DIAMETER: 2 CM
DOP CALC LVOT PEAK VEL: 1 M/S
DOP CALC LVOT STROKE VOLUME: 70 CM3
DOP CALCLVOT PEAK VEL VTI: 22.3 CM
E WAVE DECELERATION TIME: 212.26 MSEC
E/A RATIO: 1
E/E' RATIO: 6.67 M/S
ECHO LV POSTERIOR WALL: 1.1 CM (ref 0.6–1.1)
FRACTIONAL SHORTENING: 34.8 % (ref 28–44)
INTERVENTRICULAR SEPTUM: 1.1 CM (ref 0.6–1.1)
IVC DIAMETER: 1 CM
LA MAJOR: 3.52 CM
LA MINOR: 3.68 CM
LA WIDTH: 3.2 CM
LEFT ATRIUM SIZE: 3.49 CM
LEFT ATRIUM VOLUME: 34.16 CM3
LEFT INTERNAL DIMENSION IN SYSTOLE: 3 CM (ref 2.1–4)
LEFT VENTRICLE DIASTOLIC VOLUME: 96.95 ML
LEFT VENTRICLE END DIASTOLIC VOLUME APICAL 2 CHAMBER: 63.65 ML
LEFT VENTRICLE SYSTOLIC VOLUME: 34.05 ML
LEFT VENTRICULAR INTERNAL DIMENSION IN DIASTOLE: 4.6 CM (ref 3.5–6)
LEFT VENTRICULAR MASS: 181.2 G
LV LATERAL E/E' RATIO: 6.36 M/S
LV SEPTAL E/E' RATIO: 7 M/S
LVED V (TEICH): 96.95 ML
LVES V (TEICH): 34.05 ML
LVOT MG: 2.78 MMHG
LVOT MV: 0.81 CM/S
MV PEAK A VEL: 0.7 M/S
MV PEAK E VEL: 0.7 M/S
MV STENOSIS PRESSURE HALF TIME: 61.56 MS
MV VALVE AREA P 1/2 METHOD: 3.57 CM2
OHS CV RV/LV RATIO: 0.63 CM
PISA TR MAX VEL: 2.13 M/S
PULM VEIN S/D RATIO: 1.21
PV PEAK D VEL: 0.43 M/S
PV PEAK GRADIENT: 2 MMHG
PV PEAK S VEL: 0.52 M/S
PV PEAK VELOCITY: 0.68 M/S
RA MAJOR: 3.63 CM
RA PRESSURE ESTIMATED: 3 MMHG
RA WIDTH: 3.2 CM
RIGHT VENTRICLE DIASTOLIC BASEL DIMENSION: 2.9 CM
RIGHT VENTRICULAR END-DIASTOLIC DIMENSION: 2.91 CM
RV TB RVSP: 5 MMHG
RV TISSUE DOPPLER FREE WALL SYSTOLIC VELOCITY 1 (APICAL 4 CHAMBER VIEW): 11.89 CM/S
SINUS: 2.89 CM
STJ: 3.14 CM
TDI LATERAL: 0.11 M/S
TDI SEPTAL: 0.1 M/S
TDI: 0.11 M/S
TR MAX PG: 18 MMHG
TRICUSPID ANNULAR PLANE SYSTOLIC EXCURSION: 1.99 CM
TV REST PULMONARY ARTERY PRESSURE: 21 MMHG

## 2024-10-09 PROCEDURE — 93306 TTE W/DOPPLER COMPLETE: CPT | Mod: 26,,, | Performed by: INTERNAL MEDICINE

## 2024-10-09 PROCEDURE — 93306 TTE W/DOPPLER COMPLETE: CPT

## 2024-10-21 ENCOUNTER — LAB VISIT (OUTPATIENT)
Dept: LAB | Facility: HOSPITAL | Age: 49
End: 2024-10-21
Attending: INTERNAL MEDICINE
Payer: MEDICAID

## 2024-10-21 DIAGNOSIS — I50.9 CONGESTIVE HEART FAILURE, UNSPECIFIED HF CHRONICITY, UNSPECIFIED HEART FAILURE TYPE: ICD-10-CM

## 2024-10-21 LAB
ANION GAP SERPL CALC-SCNC: 10 MMOL/L (ref 8–16)
BUN SERPL-MCNC: 9 MG/DL (ref 6–20)
CALCIUM SERPL-MCNC: 9 MG/DL (ref 8.7–10.5)
CHLORIDE SERPL-SCNC: 109 MMOL/L (ref 95–110)
CO2 SERPL-SCNC: 21 MMOL/L (ref 23–29)
CREAT SERPL-MCNC: 0.8 MG/DL (ref 0.5–1.4)
EST. GFR  (NO RACE VARIABLE): >60 ML/MIN/1.73 M^2
GLUCOSE SERPL-MCNC: 259 MG/DL (ref 70–110)
POTASSIUM SERPL-SCNC: 3.8 MMOL/L (ref 3.5–5.1)
SODIUM SERPL-SCNC: 140 MMOL/L (ref 136–145)

## 2024-10-21 PROCEDURE — 80048 BASIC METABOLIC PNL TOTAL CA: CPT | Performed by: INTERNAL MEDICINE

## 2024-10-21 PROCEDURE — 36415 COLL VENOUS BLD VENIPUNCTURE: CPT | Performed by: INTERNAL MEDICINE

## 2024-10-22 ENCOUNTER — OFFICE VISIT (OUTPATIENT)
Dept: CARDIOLOGY | Facility: CLINIC | Age: 49
End: 2024-10-22
Payer: MEDICAID

## 2024-10-22 VITALS
WEIGHT: 201.25 LBS | DIASTOLIC BLOOD PRESSURE: 66 MMHG | OXYGEN SATURATION: 98 % | BODY MASS INDEX: 32.34 KG/M2 | SYSTOLIC BLOOD PRESSURE: 118 MMHG | HEART RATE: 83 BPM | HEIGHT: 66 IN | RESPIRATION RATE: 15 BRPM

## 2024-10-22 DIAGNOSIS — E66.9 NON MORBID OBESITY: ICD-10-CM

## 2024-10-22 DIAGNOSIS — I50.9 CONGESTIVE HEART FAILURE, UNSPECIFIED HF CHRONICITY, UNSPECIFIED HEART FAILURE TYPE: Primary | ICD-10-CM

## 2024-10-22 DIAGNOSIS — I50.42 CHRONIC COMBINED SYSTOLIC (CONGESTIVE) AND DIASTOLIC (CONGESTIVE) HEART FAILURE: ICD-10-CM

## 2024-10-22 PROCEDURE — 99214 OFFICE O/P EST MOD 30 MIN: CPT | Mod: S$PBB,,, | Performed by: INTERNAL MEDICINE

## 2024-10-22 PROCEDURE — 3074F SYST BP LT 130 MM HG: CPT | Mod: CPTII,,, | Performed by: INTERNAL MEDICINE

## 2024-10-22 PROCEDURE — 4010F ACE/ARB THERAPY RXD/TAKEN: CPT | Mod: CPTII,,, | Performed by: INTERNAL MEDICINE

## 2024-10-22 PROCEDURE — G2211 COMPLEX E/M VISIT ADD ON: HCPCS | Mod: S$PBB,,, | Performed by: INTERNAL MEDICINE

## 2024-10-22 PROCEDURE — 3008F BODY MASS INDEX DOCD: CPT | Mod: CPTII,,, | Performed by: INTERNAL MEDICINE

## 2024-10-22 PROCEDURE — 1160F RVW MEDS BY RX/DR IN RCRD: CPT | Mod: CPTII,,, | Performed by: INTERNAL MEDICINE

## 2024-10-22 PROCEDURE — 3046F HEMOGLOBIN A1C LEVEL >9.0%: CPT | Mod: CPTII,,, | Performed by: INTERNAL MEDICINE

## 2024-10-22 PROCEDURE — 1159F MED LIST DOCD IN RCRD: CPT | Mod: CPTII,,, | Performed by: INTERNAL MEDICINE

## 2024-10-22 PROCEDURE — 99999 PR PBB SHADOW E&M-EST. PATIENT-LVL IV: CPT | Mod: PBBFAC,,, | Performed by: INTERNAL MEDICINE

## 2024-10-22 PROCEDURE — 99214 OFFICE O/P EST MOD 30 MIN: CPT | Mod: PBBFAC | Performed by: INTERNAL MEDICINE

## 2024-10-22 PROCEDURE — 3078F DIAST BP <80 MM HG: CPT | Mod: CPTII,,, | Performed by: INTERNAL MEDICINE

## 2024-10-22 NOTE — PROGRESS NOTES
CARDIOVASCULAR CONSULTATION    REASON FOR CONSULT:   Eusebia Del Valle is a 49 y.o. female who presents for   Chief Complaint   Patient presents with    Results          HISTORY OF PRESENT ILLNESS:     Patient is a pleasant 48-year-old lady.  Recently came in with anterior STEMI.  PCI done.  Used to follow with [facility] now wants to shift to us.  Denies orthopnea PND.  No more chest pain since the PCI    Results for orders placed or performed in visit on 09/30/24   IN OFFICE EKG 12-LEAD (to [place])    Collection Time: 09/30/24  8:51 AM   Result Value Ref Range    QRS Duration 84 ms    [facility] QTC Calculation 450 ms    Narrative    Test Reason : I50.9,    Vent. Rate : 092 BPM     Atrial Rate : 092 BPM     P-R Int : 112 ms          QRS Dur : 084 ms      QT Int : 364 ms       P-R-T Axes : 023 067 040 degrees     QTc Int : 450 ms    Normal sinus rhythm  Low voltage QRS  Septal infarct (cited on or before 23-MAY-2023)  Abnormal ECG  When compared with ECG of 24-MAY-2024 22:40,  The axis Shifted right  Criteria for Inferior infarct are no longer Present  Confirmed by Alex Cobian MD ([id number]) on 10/1/2024 6:55:12 PM    Referred By:  ASIA           Confirmed By:Alex Cobian MD          ECHO    Results for orders placed during the hospital encounter of 05/24/24    Echo    Interpretation Summary    Left Ventricle: There is eccentric hypertrophy. Regional wall motion abnormalities present. There is severely reduced systolic function with a visually estimated ejection fraction of 25 - 30%. Grade I diastolic dysfunction. steph septal and apical hypokinesis    Right Ventricle: Normal right ventricular cavity size. Systolic function is normal.    Left Atrium: Left atrium is mildly dilated.    Aortic Valve: There is mild stenosis. Aortic valve area by VTI is 1.99 cm². Aortic valve peak velocity is 1.38 m/s. Mean gradient is 5 mmHg. The dimensionless index is 0.64.    Pulmonary Artery: The estimated pulmonary artery systolic  pressure is 24 mmHg.    IVC/SVC: Normal venous pressure at 3 mmHg.      STRESS TEST    No results found for this or any previous visit.        CATH    Results for orders placed during the hospital encounter of 05/24/24    Cardiac catheterization    Conclusion    Patient came in with anterior STEMI caused by InStent thrombosis of previously placed LAD stent.  Balloon angioplasty of the stent was performed with excellent angiographic results.  IVUS post PCI revealed well-expanded and apposed stent without any proximal or distal dissections.  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  No new stents were placed and we were able to restore GOLDIE 3 flow with balloon angioplasty    There was single vessel coronary artery disease.    The Prox LAD lesion was 100% stenosed with 0% stenosis post-intervention.    The post-procedure left ventricular end diastolic pressure was 32.    The estimated blood loss was <50 mL.    Procedures performed:    Coronary angiography  2.  Balloon angioplasty of proximal LAD    3. IVUS of LAD    Coronary angiography:    Left main: No significant stenosis    Lad:  InStent thrombosis with 100% occluded stent with GOLDIE 0 flow on presentation.  GOLDIE 3 flow post PCI.  IVUS guidance used for this  balloon angioplasty    Circumflex: Luminal irregularities    RCA:  Luminal irregularities    Access: Right radial artery access    Assessment plan    Importance of compliance with dual antiplatelet therapy stress to the patient.  Importance of risk factor modification stress to the patient    Aspirin 81 mg daily indefinitely    Brilinta 90 mg b.I.d. for at least 1 year and dual antiplatelet therapy for longer than 1 year if no contraindications    Statins with goal LDL less than 70    Weight loss and aggressive risk factor modification      The procedure log was documented by Documenter: Marla Harp RVT and verified by Zaire Carroll MD.    Date: 5/24/2024  Time: 10:25 PM      Notes from September  24: Patient here for follow-up.  Has been noncompliant with the heart failure medications.  States has been working too much.  Otherwise denies chest pains at rest on exertion orthopnea or PND    Notes from October 24: Patient here for follow-up.  Has been compliant with her medications.  Denies any chest pains at rest on exertion orthopnea PNd    Echo showed normalization of heart function    Results for orders placed during the hospital encounter of 10/09/24    Echo    Interpretation Summary    Left Ventricle: The left ventricle is normal in size. Mildly increased wall thickness. There is mild concentric hypertrophy. There is normal systolic function with a visually estimated ejection fraction of 60 - 65%. Grade I diastolic dysfunction.    Right Ventricle: Normal right ventricular cavity size. Systolic function is normal.    Pulmonary Artery: The estimated pulmonary artery systolic pressure is 21 mmHg.      PAST MEDICAL HISTORY:     Past Medical History:   Diagnosis Date    Diabetes mellitus        PAST SURGICAL HISTORY:     Past Surgical History:   Procedure Laterality Date    ANGIOGRAM, CORONARY, WITH LEFT HEART CATHETERIZATION N/A 5/23/2023    Procedure: Angiogram, Coronary, with Left Heart Cath;  Surgeon: Jass Gibbs MD;  Location: [facility] CATH LAB;  Service: Cardiology;  Laterality: N/A;    ANGIOGRAM, CORONARY, WITH LEFT HEART CATHETERIZATION N/A 5/24/2024    Procedure: Angiogram, Coronary, with Left Heart Cath;  Surgeon: Zaire Carroll MD;  Location: [facility] CATH LAB;  Service: Cardiology;  Laterality: N/A;    IVUS, CORONARY  5/23/2023    Procedure: IVUS, Coronary;  Surgeon: Jass Gibbs MD;  Location: [facility] CATH LAB;  Service: Cardiology;;    IVUS, CORONARY  5/24/2024    Procedure: IVUS, Coronary;  Surgeon: Zaire Carroll MD;  Location: [facility] CATH LAB;  Service: Cardiology;;    PERCUTANEOUS TRANSLUMINAL BALLOON ANGIOPLASTY OF CORONARY ARTERY  5/24/2024    Procedure: Angioplasty-coronary;   Surgeon: Zaire Carroll MD;  Location: [facility] CATH LAB;  Service: Cardiology;;    PTCA, SINGLE VESSEL  5/23/2023    Procedure: PTCA, Single Vessel;  Surgeon: Jass Gibbs MD;  Location: [facility] CATH LAB;  Service: Cardiology;;    STENT, DRUG ELUTING, SINGLE VESSEL, CORONARY  5/23/2023    Procedure: Stent, Drug Eluting, Single Vessel, Coronary;  Surgeon: Jass Gibbs MD;  Location: [facility] CATH LAB;  Service: Cardiology;;           SOCIAL HISTORY:     Social History     Socioeconomic History    Marital status: Single   Tobacco Use    Smoking status: Never    Smokeless tobacco: Never   Substance and Sexual Activity    Alcohol use: No    Drug use: No    Sexual activity: Not Currently     Birth control/protection: None     Social Drivers of Health     Financial Resource Strain: Medium Risk (6/6/2024)    Overall Financial Resource Strain (CARDIA)     Difficulty of Paying Living Expenses: Somewhat hard   Food Insecurity: No Food Insecurity (6/6/2024)    Hunger Vital Sign     Worried About Running Out of Food in the Last Year: Never true     Ran Out of Food in the Last Year: Never true   Transportation Needs: No Transportation Needs (5/23/2023)    PRAPARE - Transportation     Lack of Transportation (Medical): No     Lack of Transportation (Non-Medical): No   Physical Activity: Unknown (6/6/2024)    Exercise Vital Sign     Days of Exercise per Week: 2 days   Stress: Stress Concern Present (6/6/2024)    [employer] [place] [employer] - Occupational Stress Questionnaire     Feeling of Stress : Very much   Housing Stability: Low Risk  (5/23/2023)    Housing Stability Vital Sign     Unable to Pay for Housing in the Last Year: No     Number of Places Lived in the Last Year: 1     Unstable Housing in the Last Year: No       FAMILY HISTORY:   No family history on file.    REVIEW OF SYSTEMS:   Review of Systems   Constitutional: Negative.   HENT: Negative.     Eyes: Negative.    Cardiovascular: Negative.   "  Respiratory: Negative.     Endocrine: Negative.    Hematologic/Lymphatic: Negative.    Skin: Negative.    Musculoskeletal: Negative.    Gastrointestinal: Negative.    Genitourinary: Negative.    Neurological: Negative.    Psychiatric/Behavioral: Negative.     Allergic/Immunologic: Negative.        A 10 point review of systems was performed and all the pertinent positives have been mentioned. Rest of review of systems was negative.        PHYSICAL EXAM:     Vitals:    10/22/24 0928   BP: 118/66   Pulse: 83   Resp: 15    Body mass index is 32.49 kg/m².  Weight: 91.3 kg (201 lb 4.5 oz)   Height: 5' 6" (167.6 cm)     Physical Exam  Constitutional:       Appearance: Normal appearance. She is well-developed.   HENT:      Head: Normocephalic.   Eyes:      Pupils: Pupils are equal, round, and reactive to light.   Cardiovascular:      Rate and Rhythm: Normal rate and regular rhythm.   Pulmonary:      Effort: Pulmonary effort is normal.      Breath sounds: Normal breath sounds.   Abdominal:      General: Bowel sounds are normal.      Palpations: Abdomen is soft.      Tenderness: There is no abdominal tenderness.   Musculoskeletal:         General: Normal range of motion.      Cervical back: Normal range of motion and neck supple.   Skin:     General: Skin is warm.   Neurological:      Mental Status: She is alert and oriented to person, place, and time.           DATA:     Laboratory:  CBC:  Recent Labs   Lab 05/14/24 2026 05/24/24 1947 05/25/24  0434   WBC 11.46 12.69 13.52 H   Hemoglobin 14.5 15.6 14.3   Hematocrit 42.0 44.0 42.5   Platelets 307 300 299       CHEMISTRIES:  Recent Labs   Lab 05/24/24 1947 05/25/24  0434 10/21/24  0940   Glucose 322 H 306 H 259 H   Sodium 137 138 140   Potassium 4.3 4.1 3.8   BUN 9 9 9   Creatinine 0.9 0.8 0.8   Calcium 9.6 9.2 9.0   Magnesium 2.1  --   --        CARDIAC BIOMARKERS:  Recent Labs   Lab 05/24/24 1947 05/24/24 2244 05/25/24  0434   Troponin I 0.205 H 1.472 H 7.776 H "       COAGS:  Recent Labs   Lab 05/23/23  0112   INR 1.0       LIPIDS/LFTS:  Recent Labs   Lab 05/23/23  0112 05/14/24 2026 05/24/24 1947 05/25/24  0434   Cholesterol 215 H  --   --  172   Triglycerides 160 H  --   --  95   HDL 35 L  --   --  30 L   LDL Cholesterol 148.0  --   --  123.0   Non-HDL Cholesterol 180  --   --  142   AST 33 23 36  --    ALT 39 29 37  --        Hemoglobin A1C   Date Value Ref Range Status   05/25/2024 9.2 (H) 4.0 - 5.6 % Final     Comment:     ADA Screening Guidelines:  5.7-6.4%  Consistent with prediabetes  >or=6.5%  Consistent with diabetes    High levels of fetal hemoglobin interfere with the HbA1C  assay. Heterozygous hemoglobin variants (HbS, HgC, etc)do  not significantly interfere with this assay.   However, presence of multiple variants may affect accuracy.     05/23/2023 9.4 (H) 4.0 - 5.6 % Final     Comment:     ADA Screening Guidelines:  5.7-6.4%  Consistent with prediabetes  >or=6.5%  Consistent with diabetes    High levels of fetal hemoglobin interfere with the HbA1C  assay. Heterozygous hemoglobin variants (HbS, HgC, etc)do  not significantly interfere with this assay.   However, presence of multiple variants may affect accuracy.         TSH  Recent Labs   Lab 05/25/24 0434   TSH 0.863       The ASCVD Risk score (Claire WHEATLEY, et al., 2019) failed to calculate for the following reasons:    Risk score cannot be calculated because patient has a medical history suggesting prior/existing ASCVD       BNP    Lab Results   Component Value Date/Time    BNP 17 05/14/2024 08:26 PM    BNP 22 05/23/2023 01:12 AM         ASSESSMENT AND PLAN     Patient Active Problem List   Diagnosis    Cough    Type 2 diabetes mellitus with circulatory disorder, without long-term current use of insulin    Hyperlipidemia    Class 1 obesity in adult    Essential hypertension    Congestive heart failure    Chronic combined systolic (congestive) and diastolic (congestive) heart failure    Morbid obesity          ALLERGIES AND MEDICATION:   Review of patient's allergies indicates:  No Known Allergies     Medication List            Accurate as of October 22, 2024  9:45 AM. If you have any questions, ask your nurse or doctor.                CONTINUE taking these medications      * aspirin 81 MG Chew  Take 1 tablet (81 mg total) by mouth once daily.     * aspirin 81 MG Chew  Chew and swallow 1 tablet (81 mg total) by mouth once daily.     atorvastatin 80 MG tablet  Commonly known as: LIPITOR  Take 1 tablet (80 mg total) by mouth every evening.     ENTRESTO 24-26 mg per tablet  Generic drug: sacubitriL-valsartan  Take 1 tablet by mouth 2 (two) times daily.     JARDIANCE 10 mg tablet  Generic drug: empagliflozin  Take 1 tablet (10 mg total) by mouth once daily.     metoprolol succinate 25 MG 24 hr tablet  Commonly known as: TOPROL-XL  Take 1 tablet (25 mg total) by mouth once daily.     nitroGLYCERIN 0.4 MG SL tablet  Commonly known as: NITROSTAT  Place 1 tablet (0.4 mg total) under the tongue every 5 (five) minutes as needed for Chest pain.     OZEMPIC 0.25 mg or 0.5 mg (2 mg/3 mL) pen injector  Generic drug: semaglutide     * ticagrelor 90 mg tablet  Commonly known as: BRILINTA  Take 1 tablet (90 mg total) by mouth 2 (two) times daily.     * BRILINTA 90 mg tablet  Generic drug: ticagrelor  Take 1 tablet (90 mg total) by mouth 2 (two) times daily.           * This list has 4 medication(s) that are the same as other medications prescribed for you. Read the directions carefully, and ask your doctor or other care provider to review them with you.                  No orders of the defined types were placed in this encounter.      Coronary artery disease status post PCI.  Continue aspirin and Brilinta.  Continue Brilinta uninterrupted for at least 1 year and longer if no contraindications.  Aspirin indefinitely.  Statins     Weight loss     Ischemic cardiomyopathy:  States now is compliant with the medications.  EF has  normalized.    Dyslipidemia: Continue statins.  LDL not at goal yet.  Recheck in 6 months.    Lab Results   Component Value Date    LDLCALC 123.0 05/25/2024     Obesity with BMI of 32.  Plant based diet has been recommended        Thank you very much for involving me in the care of your patient.  Please do not hesitate to contact me if there are any questions.      Zaire Carroll MD, FACC, [facility]  Interventional Cardiologist, [facility].       Visit today included increased complexity associated with the care of the episodic problem cad, ischemic cardiomyopathy, hypertension, dyslipidemia addressed and managing the longitudinal care of the patient due to the serious and/or complex managed problem(s)   Patient Active Problem List   Diagnosis    Cough    Type 2 diabetes mellitus with circulatory disorder, without long-term current use of insulin    Hyperlipidemia    Class 1 obesity in adult    Essential hypertension    Congestive heart failure    Chronic combined systolic (congestive) and diastolic (congestive) heart failure    Morbid obesity     .      This note was dictated with the help of speech recognition software.  There might be un-intended errors and/or substitutions.

## 2024-12-04 ENCOUNTER — TELEPHONE (OUTPATIENT)
Dept: CARDIOLOGY | Facility: CLINIC | Age: 49
End: 2024-12-04
Payer: MEDICAID

## 2024-12-04 NOTE — TELEPHONE ENCOUNTER
----- Message from Nuno Najera sent at 12/4/2024  8:56 AM CST -----  Type:  Sooner Appointment Request    Patient is requesting a sooner appointment.  Patient declined first available appointment listed as well as another facility and provider .  Patient will not accept being placed on the waitlist and is requesting a message be sent to doctor.    Name of Caller:  Pt   When is the first available appointment?  3/25  Symptoms:  F/u   Would the patient rather a call back or a response via [facility]?  Callback   Best Call Back Number:  Telephone Information:  Mobile          [phone]     Additional Information:     Received letter to f/u in Jan

## (undated) DEVICE — KIT PROBE COVER WITH GEL

## (undated) DEVICE — GUIDE LAUNCHER 6FR EBU 3.5

## (undated) DEVICE — KIT GLIDESHEATH SLEND 6FR 10CM

## (undated) DEVICE — CATH EAGLE EYE PLATINUM

## (undated) DEVICE — ANGIOTOUCH KIT

## (undated) DEVICE — KIT ESSENTIALS W/ Y ADAPTER

## (undated) DEVICE — CATH DXTERITY JR40 100CM 5FR

## (undated) DEVICE — VALVE CONTROL COPILOT

## (undated) DEVICE — GUIDEWIRE RUNTHROUGH EF 180CM

## (undated) DEVICE — HEMOSTAT VASC BAND REG 24CM

## (undated) DEVICE — CATH EMERGE MR 12 X 2.50

## (undated) DEVICE — PRESTO INFLATION DEVICE

## (undated) DEVICE — PAD DEFIB CADENCE ADULT R2

## (undated) DEVICE — KIT SYR REUSABLE

## (undated) DEVICE — OMNIPAQUE CONTRAST 350MG/100ML

## (undated) DEVICE — GUIDEWIRE PROWATER .014X180CM

## (undated) DEVICE — CATH DXTERITY JL35 100CM 5FR

## (undated) DEVICE — CATH NC EMERGE MR 3.5X20MM

## (undated) DEVICE — PACK CATH LAB

## (undated) DEVICE — CONTRAST VISIPAQUE 150ML

## (undated) DEVICE — KIT MANIFOLD LOW PRESS TUBING

## (undated) DEVICE — WIRE GUIDE SAFE-T-J .035 260CM

## (undated) DEVICE — CATH EMPULSE ANGLED 5FR PIGTAI

## (undated) DEVICE — PAD RADI FEMORAL

## (undated) DEVICE — KIT HAND CONTROL HIGH PRESSUR

## (undated) DEVICE — CATH EMERGE MR 15 X 3.50